# Patient Record
Sex: MALE | Race: ASIAN | NOT HISPANIC OR LATINO | Employment: OTHER | URBAN - METROPOLITAN AREA
[De-identification: names, ages, dates, MRNs, and addresses within clinical notes are randomized per-mention and may not be internally consistent; named-entity substitution may affect disease eponyms.]

---

## 2019-06-03 ENCOUNTER — OFFICE VISIT (OUTPATIENT)
Dept: URGENT CARE | Facility: CLINIC | Age: 44
End: 2019-06-03
Payer: COMMERCIAL

## 2019-06-03 VITALS
SYSTOLIC BLOOD PRESSURE: 124 MMHG | HEART RATE: 58 BPM | WEIGHT: 166 LBS | HEIGHT: 69 IN | OXYGEN SATURATION: 97 % | RESPIRATION RATE: 16 BRPM | DIASTOLIC BLOOD PRESSURE: 86 MMHG | TEMPERATURE: 98.9 F | BODY MASS INDEX: 24.59 KG/M2

## 2019-06-03 DIAGNOSIS — H10.212 CHEMICAL CONJUNCTIVITIS OF LEFT EYE: ICD-10-CM

## 2019-06-03 PROCEDURE — 99203 OFFICE O/P NEW LOW 30 MIN: CPT | Performed by: PHYSICIAN ASSISTANT

## 2019-06-03 ASSESSMENT — ENCOUNTER SYMPTOMS
BLURRED VISION: 0
VOMITING: 0
PHOTOPHOBIA: 0
DOUBLE VISION: 0
CARDIOVASCULAR NEGATIVE: 1
EYE ITCHING: 0
FEVER: 0
NAUSEA: 0
EYE DISCHARGE: 0
CHILLS: 0
EYE PAIN: 0
FOREIGN BODY SENSATION: 0
EYE REDNESS: 1
RESPIRATORY NEGATIVE: 1

## 2019-06-04 NOTE — PROGRESS NOTES
Subjective:      Fish Sandoval is a 43 y.o. male who presents with Eye Problem (put wrong drops in eye,red,swelling,left eye )            Eye Problem    The left eye is affected. This is a new problem. The current episode started today. The problem occurs constantly. The problem has been unchanged. Injury mechanism: Cleanig drops. There is no known exposure to pink eye. He wears contacts. Associated symptoms include eye redness. Pertinent negatives include no blurred vision, eye discharge, double vision, fever, foreign body sensation, itching, nausea, photophobia, recent URI or vomiting. He has tried eye drops for the symptoms. The treatment provided mild relief.   Patient accidentally put in his contact cleaning solution in his left eye.  Now it is red inflamed and swollen.  No pain or discharge.  No foreign body sensation or blurry vision.    PMH:  has no past medical history on file.  MEDS:   Current Outpatient Prescriptions:   •  azithromycin (ZITHROMAX) 250 MG TABS, Take  by mouth every day. 2 tabs by mouth day 1, 1 tab by mouth days 2-5, Disp: 6 Each, Rfl: 0  •  diazepam (VALIUM) 5 MG TABS, Take 2 Tabs by mouth. As needed for flying, Disp: 20 Each, Rfl: 1  •  azithromycin (ZITHROMAX) 250 MG TABS, Take  by mouth every day. 2 tabs by mouth day 1, 1 tab by mouth days 2-5, Disp: 6 Each, Rfl: 1  •  azelastine (ASTELIN) 137 MCG/SPRAY nasal spray, Spray 1 Spray in nose 2 times a day. Use in each nostril as directed, Disp: 1 Bottle, Rfl: 1  ALLERGIES:   Allergies   Allergen Reactions   • Pcn [Penicillins]      SURGHX:   Past Surgical History:   Procedure Laterality Date   • APPENDECTOMY       SOCHX:  reports that he has never smoked. He does not have any smokeless tobacco history on file. He reports that he does not drink alcohol.  FH: family history includes Cancer in his unknown relative.      Review of Systems   Constitutional: Negative for chills and fever.   HENT: Negative.    Eyes: Positive for redness. Negative  "for blurred vision, double vision, photophobia, pain, discharge and itching.   Respiratory: Negative.    Cardiovascular: Negative.    Gastrointestinal: Negative for nausea and vomiting.       Medications, Allergies, and current problem list reviewed today in Epic     Objective:     /86 (BP Location: Left arm, Patient Position: Sitting)   Pulse (!) 58   Temp 37.2 °C (98.9 °F) (Temporal)   Resp 16   Ht 1.753 m (5' 9\")   Wt 75.3 kg (166 lb)   SpO2 97%   BMI 24.51 kg/m²      Physical Exam   Constitutional: He is oriented to person, place, and time. He appears well-developed and well-nourished. No distress.   HENT:   Head: Normocephalic and atraumatic.   Eyes: Pupils are equal, round, and reactive to light. EOM and lids are normal. Lids are everted and swept, no foreign bodies found. Right eye exhibits no discharge and no exudate. No foreign body present in the right eye. Right conjunctiva is injected. Left conjunctiva is not injected.   Neck: Normal range of motion. Neck supple.   Cardiovascular: Normal rate, regular rhythm and normal heart sounds.    No murmur heard.  Pulmonary/Chest: Effort normal and breath sounds normal. No respiratory distress. He has no wheezes.   Neurological: He is alert and oriented to person, place, and time.   Skin: Skin is warm and dry. He is not diaphoretic.   Psychiatric: He has a normal mood and affect. His behavior is normal. Judgment and thought content normal.   Nursing note and vitals reviewed.              Assessment/Plan:     1. Chemical conjunctivitis of left eye       Erythematous irritated left conjunctiva.  Denies pain or vision changes.  Snellen vision check at baseline. no discharge or signs of infection.  Consistent with an acute chemical conjunctivitis.  No intervention indicated.  Artificial tears.  OTC meds and conservative measures as discussed    Return to clinic or go to ED if symptoms worsen or persist. Indications for ED discussed at length. Patient " voices understanding. Follow-up with your primary care provider in 3-5 days. Red flags discussed. All side effects of medication discussed including allergic response, GI upset, tendon injury, etc.    Please note that this dictation was created using voice recognition software. I have made every reasonable attempt to correct obvious errors, but I expect that there are errors of grammar and possibly content that I did not discover before finalizing the note.

## 2019-07-27 ENCOUNTER — OFFICE VISIT (OUTPATIENT)
Dept: URGENT CARE | Facility: CLINIC | Age: 44
End: 2019-07-27
Payer: COMMERCIAL

## 2019-07-27 VITALS
WEIGHT: 166 LBS | DIASTOLIC BLOOD PRESSURE: 80 MMHG | RESPIRATION RATE: 16 BRPM | BODY MASS INDEX: 23.77 KG/M2 | TEMPERATURE: 98.2 F | HEART RATE: 74 BPM | OXYGEN SATURATION: 95 % | SYSTOLIC BLOOD PRESSURE: 120 MMHG | HEIGHT: 70 IN

## 2019-07-27 DIAGNOSIS — J34.0 CELLULITIS OF MUCOUS MEMBRANE OF NOSE: ICD-10-CM

## 2019-07-27 DIAGNOSIS — I88.9 LYMPHADENITIS: ICD-10-CM

## 2019-07-27 PROCEDURE — 99214 OFFICE O/P EST MOD 30 MIN: CPT | Performed by: NURSE PRACTITIONER

## 2019-07-27 RX ORDER — SULFAMETHOXAZOLE AND TRIMETHOPRIM 800; 160 MG/1; MG/1
1 TABLET ORAL 2 TIMES DAILY
Qty: 14 TAB | Refills: 0 | Status: SHIPPED | OUTPATIENT
Start: 2019-07-27 | End: 2019-08-03

## 2019-07-27 ASSESSMENT — ENCOUNTER SYMPTOMS
CHILLS: 0
FEVER: 0
SWOLLEN GLANDS: 1

## 2019-07-27 NOTE — PROGRESS NOTES
"Subjective:      Fish Sandoval is a 43 y.o. male who presents with Swollen Glands (x 1 wk, swollen gland on Lt. side of throat, runny nose, no pain to swallow)    History reviewed. No pertinent past medical history.  Social History     Social History   • Marital status: Single     Spouse name: N/A   • Number of children: N/A   • Years of education: N/A     Occupational History   • Not on file.     Social History Main Topics   • Smoking status: Never Smoker   • Smokeless tobacco: Never Used   • Alcohol use No   • Drug use: Unknown   • Sexual activity: Not on file     Other Topics Concern   • Not on file     Social History Narrative   • No narrative on file     Family History   Problem Relation Age of Onset   • Cancer Unknown        Allergies: Pcn [penicillins]    Patient is a 43-year-old male who presents today with complaint of pain to the left nostril and noted submandibular left lymphadenopathy.  Symptoms started over the last week.         Other    This is a new problem. The current episode started in the past 7 days. The problem occurs constantly. The problem has been unchanged. Associated symptoms include congestion and swollen glands. Pertinent negatives include no chills or fever.  Nothing aggravates the symptoms. He has tried nothing for the symptoms. The treatment provided no relief.       Review of Systems   Constitutional: Positive for malaise/fatigue. Negative for chills and fever.   HENT: Positive for congestion.    Skin: Negative.    All other systems reviewed and are negative.         Objective:     /80 (BP Location: Right arm, Patient Position: Sitting, BP Cuff Size: Adult)   Pulse 74   Temp 36.8 °C (98.2 °F) (Temporal)   Resp 16   Ht 1.778 m (5' 10\")   Wt 75.3 kg (166 lb)   SpO2 95%   BMI 23.82 kg/m²       Physical Exam   Constitutional: He is oriented to person, place, and time. He appears well-developed and well-nourished. No distress.   HENT:   Head: Normocephalic.   Right Ear: " External ear normal.   Left Ear: External ear normal.   Mouth/Throat: Oropharynx is clear and moist. No oropharyngeal exudate.   There is significant redness and inflammation to the left nostril.  No drainage.  No tenderness over the frontal or maxillary sinuses.   Eyes: Pupils are equal, round, and reactive to light. Conjunctivae and EOM are normal.   Neck: Normal range of motion. Neck supple.   Palpable submandibular lymph node on the left side.   Cardiovascular: Normal rate, regular rhythm and normal heart sounds.    Pulmonary/Chest: Effort normal and breath sounds normal.   Musculoskeletal: Normal range of motion.   Lymphadenopathy:     He has cervical adenopathy.   Neurological: He is alert and oriented to person, place, and time.   Skin: Skin is warm and dry. He is not diaphoretic.   Psychiatric: He has a normal mood and affect. His behavior is normal. Judgment and thought content normal.   Vitals reviewed.              Assessment/Plan:     1. Lymphadenitis  2.  Cellulitis of the mucous membranes of the left nostril    Topical Bactroban  Bactrim DS; start for worsening of symptoms, or minimal response to topical Bactroban  Follow-up for persistent or worsening of symptoms

## 2020-01-11 ENCOUNTER — OFFICE VISIT (OUTPATIENT)
Dept: URGENT CARE | Facility: CLINIC | Age: 45
End: 2020-01-11
Payer: COMMERCIAL

## 2020-01-11 VITALS
DIASTOLIC BLOOD PRESSURE: 76 MMHG | TEMPERATURE: 97.2 F | BODY MASS INDEX: 23.77 KG/M2 | HEART RATE: 79 BPM | SYSTOLIC BLOOD PRESSURE: 116 MMHG | WEIGHT: 166 LBS | OXYGEN SATURATION: 98 % | RESPIRATION RATE: 15 BRPM | HEIGHT: 70 IN

## 2020-01-11 DIAGNOSIS — R68.89 SUSPECTED SOFT TISSUE INFECTION: ICD-10-CM

## 2020-01-11 DIAGNOSIS — R21 RASH: ICD-10-CM

## 2020-01-11 PROCEDURE — 99214 OFFICE O/P EST MOD 30 MIN: CPT | Performed by: NURSE PRACTITIONER

## 2020-01-11 RX ORDER — DIPHENOXYLATE HYDROCHLORIDE AND ATROPINE SULFATE 2.5; .025 MG/1; MG/1
1 TABLET ORAL
COMMUNITY

## 2020-01-11 RX ORDER — DIPHENHYDRAMINE HCL 25 MG
25 CAPSULE ORAL
COMMUNITY

## 2020-01-11 RX ORDER — ERGOCALCIFEROL (VITAMIN D2) 10 MCG
400 TABLET ORAL
COMMUNITY

## 2020-01-11 RX ORDER — TADALAFIL 5 MG/1
TABLET ORAL
COMMUNITY

## 2020-01-11 RX ORDER — LANOLIN ALCOHOL/MO/W.PET/CERES
1000 CREAM (GRAM) TOPICAL
COMMUNITY

## 2020-01-12 ASSESSMENT — ENCOUNTER SYMPTOMS
FEVER: 0
NAIL CHANGES: 0
RHINORRHEA: 0
VOMITING: 0
COUGH: 0
DIZZINESS: 0
NAUSEA: 0
CHILLS: 0
EYE PAIN: 0
MYALGIAS: 0
SHORTNESS OF BREATH: 0
FATIGUE: 0
SORE THROAT: 0

## 2020-01-12 NOTE — PROGRESS NOTES
"Subjective:   Fish Sandoval  is a 44 y.o. male who presents for Rash        Rash   This is a new problem. The current episode started 1 to 4 weeks ago (Patient associates rash with face mask from CPAP machine which he recently started wearing at night.  Rash does not worsen and/or improve). The problem is unchanged. The affected locations include the face. The rash is characterized by redness. He was exposed to nothing. Pertinent negatives include no cough, eye pain, fatigue, fever, nail changes, rhinorrhea, shortness of breath, sore throat or vomiting. Past treatments include nothing. The treatment provided no relief. His past medical history is significant for eczema. There is no history of allergies.     Review of Systems   Constitutional: Negative for chills, fatigue and fever.   HENT: Negative for rhinorrhea and sore throat.    Eyes: Negative for pain.   Respiratory: Negative for cough and shortness of breath.    Cardiovascular: Negative for chest pain.   Gastrointestinal: Negative for nausea and vomiting.   Genitourinary: Negative for hematuria.   Musculoskeletal: Negative for myalgias.   Skin: Positive for rash. Negative for itching and nail changes.   Neurological: Negative for dizziness.     Allergies   Allergen Reactions   • Pcn [Penicillins]       Objective:   /76   Pulse 79   Temp 36.2 °C (97.2 °F) (Temporal)   Resp 15   Ht 1.778 m (5' 10\")   Wt 75.3 kg (166 lb)   SpO2 98%   BMI 23.82 kg/m²   Physical Exam  Vitals signs and nursing note reviewed.   Constitutional:       General: He is not in acute distress.     Appearance: He is well-developed.   HENT:      Head: Normocephalic and atraumatic.      Right Ear: External ear normal.      Left Ear: External ear normal.      Nose: Nose normal.      Mouth/Throat:      Mouth: Mucous membranes are moist.   Eyes:      Conjunctiva/sclera: Conjunctivae normal.      Pupils: Pupils are equal, round, and reactive to light.   Cardiovascular:      Rate and " Rhythm: Normal rate and regular rhythm.      Heart sounds: No murmur.   Pulmonary:      Effort: Pulmonary effort is normal. No respiratory distress.      Breath sounds: Normal breath sounds.   Abdominal:      General: There is no distension.      Palpations: Abdomen is soft.      Tenderness: There is no tenderness.   Musculoskeletal: Normal range of motion.   Skin:     General: Skin is warm and dry.      Findings: Erythema and rash present. Rash is not crusting, macular, purpuric, pustular or vesicular.             Comments: 1mm erythematous rash of left upper lip.  No crusting or induration.  Without fluctuation.   Neurological:      General: No focal deficit present.      Mental Status: He is alert and oriented to person, place, and time. Mental status is at baseline.      Gait: Gait (gait at baseline) normal.   Psychiatric:         Judgment: Judgment normal.           Assessment/Plan:     1. Suspected soft tissue infection  mupirocin (BACTROBAN) 2 % Ointment   2. Rash     Patient is a 44-year-old male present with stated above.  Suspect soft tissue infection will trial topical antimicrobial ointment.  Discussed sterilizing CPAP machine each night prior to usage.  Patient given precautionary s/sx that mandate immediate follow up and evaluation in the ED. Advised of risks of not doing so.    DDX, Supportive care, and indications for immediate follow-up discussed with patient.    Instructed to return to clinic or nearest emergency department if we are not available for any change in condition, further concerns, or worsening of symptoms.    The patient demonstrated a good understanding and agreed with the treatment plan.

## 2020-02-06 ENCOUNTER — HOSPITAL ENCOUNTER (OUTPATIENT)
Dept: LAB | Facility: MEDICAL CENTER | Age: 45
End: 2020-02-06
Attending: FAMILY MEDICINE
Payer: COMMERCIAL

## 2020-02-06 LAB
ALBUMIN SERPL BCP-MCNC: 4.6 G/DL (ref 3.2–4.9)
ALBUMIN/GLOB SERPL: 1.6 G/DL
ALP SERPL-CCNC: 60 U/L (ref 30–99)
ALT SERPL-CCNC: 34 U/L (ref 2–50)
ANION GAP SERPL CALC-SCNC: 8 MMOL/L (ref 0–11.9)
AST SERPL-CCNC: 26 U/L (ref 12–45)
BASOPHILS # BLD AUTO: 1.2 % (ref 0–1.8)
BASOPHILS # BLD: 0.05 K/UL (ref 0–0.12)
BILIRUB SERPL-MCNC: 0.7 MG/DL (ref 0.1–1.5)
BUN SERPL-MCNC: 20 MG/DL (ref 8–22)
CALCIUM SERPL-MCNC: 9.3 MG/DL (ref 8.5–10.5)
CHLORIDE SERPL-SCNC: 106 MMOL/L (ref 96–112)
CO2 SERPL-SCNC: 25 MMOL/L (ref 20–33)
CREAT SERPL-MCNC: 1.08 MG/DL (ref 0.5–1.4)
EOSINOPHIL # BLD AUTO: 0.15 K/UL (ref 0–0.51)
EOSINOPHIL NFR BLD: 3.6 % (ref 0–6.9)
ERYTHROCYTE [DISTWIDTH] IN BLOOD BY AUTOMATED COUNT: 45.1 FL (ref 35.9–50)
ERYTHROCYTE [SEDIMENTATION RATE] IN BLOOD BY WESTERGREN METHOD: 12 MM/HOUR (ref 0–15)
FASTING STATUS PATIENT QL REPORTED: NORMAL
GLOBULIN SER CALC-MCNC: 2.8 G/DL (ref 1.9–3.5)
GLUCOSE SERPL-MCNC: 93 MG/DL (ref 65–99)
HCT VFR BLD AUTO: 45.5 % (ref 42–52)
HGB BLD-MCNC: 15.4 G/DL (ref 14–18)
IMM GRANULOCYTES # BLD AUTO: 0.01 K/UL (ref 0–0.11)
IMM GRANULOCYTES NFR BLD AUTO: 0.2 % (ref 0–0.9)
LYMPHOCYTES # BLD AUTO: 1.57 K/UL (ref 1–4.8)
LYMPHOCYTES NFR BLD: 37.6 % (ref 22–41)
MCH RBC QN AUTO: 32.3 PG (ref 27–33)
MCHC RBC AUTO-ENTMCNC: 33.8 G/DL (ref 33.7–35.3)
MCV RBC AUTO: 95.4 FL (ref 81.4–97.8)
MONOCYTES # BLD AUTO: 0.32 K/UL (ref 0–0.85)
MONOCYTES NFR BLD AUTO: 7.7 % (ref 0–13.4)
NEUTROPHILS # BLD AUTO: 2.08 K/UL (ref 1.82–7.42)
NEUTROPHILS NFR BLD: 49.7 % (ref 44–72)
NRBC # BLD AUTO: 0 K/UL
NRBC BLD-RTO: 0 /100 WBC
PLATELET # BLD AUTO: 231 K/UL (ref 164–446)
PMV BLD AUTO: 10.8 FL (ref 9–12.9)
POTASSIUM SERPL-SCNC: 4 MMOL/L (ref 3.6–5.5)
PROT SERPL-MCNC: 7.4 G/DL (ref 6–8.2)
RBC # BLD AUTO: 4.77 M/UL (ref 4.7–6.1)
SODIUM SERPL-SCNC: 139 MMOL/L (ref 135–145)
TSH SERPL DL<=0.005 MIU/L-ACNC: 2.57 UIU/ML (ref 0.38–5.33)
WBC # BLD AUTO: 4.2 K/UL (ref 4.8–10.8)

## 2020-02-06 PROCEDURE — 85025 COMPLETE CBC W/AUTO DIFF WBC: CPT

## 2020-02-06 PROCEDURE — 36415 COLL VENOUS BLD VENIPUNCTURE: CPT

## 2020-02-06 PROCEDURE — 85652 RBC SED RATE AUTOMATED: CPT

## 2020-02-06 PROCEDURE — 80053 COMPREHEN METABOLIC PANEL: CPT

## 2020-02-06 PROCEDURE — 84443 ASSAY THYROID STIM HORMONE: CPT

## 2020-03-13 ENCOUNTER — HOSPITAL ENCOUNTER (OUTPATIENT)
Dept: LAB | Facility: MEDICAL CENTER | Age: 45
End: 2020-03-13
Attending: FAMILY MEDICINE
Payer: COMMERCIAL

## 2020-03-13 LAB
BASOPHILS # BLD AUTO: 0.7 % (ref 0–1.8)
BASOPHILS # BLD: 0.03 K/UL (ref 0–0.12)
EOSINOPHIL # BLD AUTO: 0.09 K/UL (ref 0–0.51)
EOSINOPHIL NFR BLD: 2.2 % (ref 0–6.9)
ERYTHROCYTE [DISTWIDTH] IN BLOOD BY AUTOMATED COUNT: 47.8 FL (ref 35.9–50)
HCT VFR BLD AUTO: 45.3 % (ref 42–52)
HGB BLD-MCNC: 15.4 G/DL (ref 14–18)
IMM GRANULOCYTES # BLD AUTO: 0 K/UL (ref 0–0.11)
IMM GRANULOCYTES NFR BLD AUTO: 0 % (ref 0–0.9)
LYMPHOCYTES # BLD AUTO: 1.56 K/UL (ref 1–4.8)
LYMPHOCYTES NFR BLD: 38.5 % (ref 22–41)
MCH RBC QN AUTO: 32.8 PG (ref 27–33)
MCHC RBC AUTO-ENTMCNC: 34 G/DL (ref 33.7–35.3)
MCV RBC AUTO: 96.4 FL (ref 81.4–97.8)
MONOCYTES # BLD AUTO: 0.34 K/UL (ref 0–0.85)
MONOCYTES NFR BLD AUTO: 8.4 % (ref 0–13.4)
NEUTROPHILS # BLD AUTO: 2.03 K/UL (ref 1.82–7.42)
NEUTROPHILS NFR BLD: 50.2 % (ref 44–72)
NRBC # BLD AUTO: 0 K/UL
NRBC BLD-RTO: 0 /100 WBC
PLATELET # BLD AUTO: 175 K/UL (ref 164–446)
PMV BLD AUTO: 10.8 FL (ref 9–12.9)
RBC # BLD AUTO: 4.7 M/UL (ref 4.7–6.1)
WBC # BLD AUTO: 4.1 K/UL (ref 4.8–10.8)

## 2020-03-13 PROCEDURE — 85025 COMPLETE CBC W/AUTO DIFF WBC: CPT

## 2020-03-13 PROCEDURE — 36415 COLL VENOUS BLD VENIPUNCTURE: CPT

## 2020-07-17 ENCOUNTER — HOSPITAL ENCOUNTER (OUTPATIENT)
Dept: RADIOLOGY | Facility: MEDICAL CENTER | Age: 45
End: 2020-07-17
Attending: FAMILY MEDICINE
Payer: COMMERCIAL

## 2020-07-17 DIAGNOSIS — N50.9: ICD-10-CM

## 2020-07-17 PROCEDURE — 76870 US EXAM SCROTUM: CPT

## 2021-01-11 ENCOUNTER — HOSPITAL ENCOUNTER (OUTPATIENT)
Dept: RADIOLOGY | Facility: MEDICAL CENTER | Age: 46
End: 2021-01-11
Attending: UROLOGY
Payer: COMMERCIAL

## 2021-01-11 DIAGNOSIS — D29.30: ICD-10-CM

## 2021-01-11 PROCEDURE — 76870 US EXAM SCROTUM: CPT

## 2021-03-04 ENCOUNTER — HOSPITAL ENCOUNTER (OUTPATIENT)
Dept: LAB | Facility: MEDICAL CENTER | Age: 46
End: 2021-03-04
Attending: FAMILY MEDICINE
Payer: COMMERCIAL

## 2021-03-04 LAB
ALBUMIN SERPL BCP-MCNC: 4.4 G/DL (ref 3.2–4.9)
ALBUMIN/GLOB SERPL: 1.5 G/DL
ALP SERPL-CCNC: 73 U/L (ref 30–99)
ALT SERPL-CCNC: 34 U/L (ref 2–50)
ANION GAP SERPL CALC-SCNC: 10 MMOL/L (ref 7–16)
AST SERPL-CCNC: 27 U/L (ref 12–45)
BASOPHILS # BLD AUTO: 0.7 % (ref 0–1.8)
BASOPHILS # BLD: 0.03 K/UL (ref 0–0.12)
BILIRUB SERPL-MCNC: 0.6 MG/DL (ref 0.1–1.5)
BUN SERPL-MCNC: 21 MG/DL (ref 8–22)
CALCIUM SERPL-MCNC: 9.5 MG/DL (ref 8.5–10.5)
CHLORIDE SERPL-SCNC: 102 MMOL/L (ref 96–112)
CHOLEST SERPL-MCNC: 204 MG/DL (ref 100–199)
CO2 SERPL-SCNC: 27 MMOL/L (ref 20–33)
CREAT SERPL-MCNC: 0.91 MG/DL (ref 0.5–1.4)
EOSINOPHIL # BLD AUTO: 0.13 K/UL (ref 0–0.51)
EOSINOPHIL NFR BLD: 3 % (ref 0–6.9)
ERYTHROCYTE [DISTWIDTH] IN BLOOD BY AUTOMATED COUNT: 45.6 FL (ref 35.9–50)
FASTING STATUS PATIENT QL REPORTED: NORMAL
GLOBULIN SER CALC-MCNC: 3 G/DL (ref 1.9–3.5)
GLUCOSE SERPL-MCNC: 99 MG/DL (ref 65–99)
HCT VFR BLD AUTO: 47.1 % (ref 42–52)
HDLC SERPL-MCNC: 84 MG/DL
HGB BLD-MCNC: 15.8 G/DL (ref 14–18)
IMM GRANULOCYTES # BLD AUTO: 0.01 K/UL (ref 0–0.11)
IMM GRANULOCYTES NFR BLD AUTO: 0.2 % (ref 0–0.9)
LDLC SERPL CALC-MCNC: 109 MG/DL
LYMPHOCYTES # BLD AUTO: 1.56 K/UL (ref 1–4.8)
LYMPHOCYTES NFR BLD: 36.1 % (ref 22–41)
MCH RBC QN AUTO: 32.1 PG (ref 27–33)
MCHC RBC AUTO-ENTMCNC: 33.5 G/DL (ref 33.7–35.3)
MCV RBC AUTO: 95.7 FL (ref 81.4–97.8)
MONOCYTES # BLD AUTO: 0.41 K/UL (ref 0–0.85)
MONOCYTES NFR BLD AUTO: 9.5 % (ref 0–13.4)
NEUTROPHILS # BLD AUTO: 2.18 K/UL (ref 1.82–7.42)
NEUTROPHILS NFR BLD: 50.5 % (ref 44–72)
NRBC # BLD AUTO: 0 K/UL
NRBC BLD-RTO: 0 /100 WBC
PLATELET # BLD AUTO: 190 K/UL (ref 164–446)
PMV BLD AUTO: 11.2 FL (ref 9–12.9)
POTASSIUM SERPL-SCNC: 4.1 MMOL/L (ref 3.6–5.5)
PROT SERPL-MCNC: 7.4 G/DL (ref 6–8.2)
RBC # BLD AUTO: 4.92 M/UL (ref 4.7–6.1)
SODIUM SERPL-SCNC: 139 MMOL/L (ref 135–145)
TRIGL SERPL-MCNC: 54 MG/DL (ref 0–149)
WBC # BLD AUTO: 4.3 K/UL (ref 4.8–10.8)

## 2021-03-04 PROCEDURE — 80061 LIPID PANEL: CPT

## 2021-03-04 PROCEDURE — 83036 HEMOGLOBIN GLYCOSYLATED A1C: CPT

## 2021-03-04 PROCEDURE — 80053 COMPREHEN METABOLIC PANEL: CPT

## 2021-03-04 PROCEDURE — 85025 COMPLETE CBC W/AUTO DIFF WBC: CPT

## 2021-03-04 PROCEDURE — 36415 COLL VENOUS BLD VENIPUNCTURE: CPT

## 2021-03-05 LAB
EST. AVERAGE GLUCOSE BLD GHB EST-MCNC: 105 MG/DL
HBA1C MFR BLD: 5.3 % (ref 4–5.6)

## 2021-05-12 ENCOUNTER — HOSPITAL ENCOUNTER (OUTPATIENT)
Dept: RADIOLOGY | Facility: MEDICAL CENTER | Age: 46
End: 2021-05-12
Attending: FAMILY MEDICINE
Payer: COMMERCIAL

## 2021-05-12 DIAGNOSIS — R52 DEAFFERENTATION PAIN: ICD-10-CM

## 2021-05-12 PROCEDURE — 76536 US EXAM OF HEAD AND NECK: CPT

## 2022-01-10 ENCOUNTER — HOSPITAL ENCOUNTER (OUTPATIENT)
Dept: RADIOLOGY | Facility: MEDICAL CENTER | Age: 47
End: 2022-01-10
Attending: UROLOGY
Payer: COMMERCIAL

## 2022-01-10 DIAGNOSIS — D29.30: ICD-10-CM

## 2022-01-10 PROCEDURE — 76870 US EXAM SCROTUM: CPT

## 2022-03-11 ENCOUNTER — HOSPITAL ENCOUNTER (OUTPATIENT)
Dept: LAB | Facility: MEDICAL CENTER | Age: 47
End: 2022-03-11
Attending: FAMILY MEDICINE
Payer: COMMERCIAL

## 2022-03-11 LAB
EST. AVERAGE GLUCOSE BLD GHB EST-MCNC: 105 MG/DL
HBA1C MFR BLD: 5.3 % (ref 4–5.6)

## 2022-03-11 PROCEDURE — 36415 COLL VENOUS BLD VENIPUNCTURE: CPT

## 2022-03-11 PROCEDURE — 83036 HEMOGLOBIN GLYCOSYLATED A1C: CPT

## 2022-03-11 PROCEDURE — 84270 ASSAY OF SEX HORMONE GLOBUL: CPT

## 2022-03-11 PROCEDURE — 80053 COMPREHEN METABOLIC PANEL: CPT

## 2022-03-11 PROCEDURE — 80061 LIPID PANEL: CPT

## 2022-03-11 PROCEDURE — 84402 ASSAY OF FREE TESTOSTERONE: CPT

## 2022-03-11 PROCEDURE — 84403 ASSAY OF TOTAL TESTOSTERONE: CPT

## 2022-03-12 LAB
ALBUMIN SERPL BCP-MCNC: 4.7 G/DL (ref 3.2–4.9)
ALBUMIN/GLOB SERPL: 2.1 G/DL
ALP SERPL-CCNC: 59 U/L (ref 30–99)
ALT SERPL-CCNC: 26 U/L (ref 2–50)
ANION GAP SERPL CALC-SCNC: 10 MMOL/L (ref 7–16)
AST SERPL-CCNC: 27 U/L (ref 12–45)
BILIRUB SERPL-MCNC: 0.7 MG/DL (ref 0.1–1.5)
BUN SERPL-MCNC: 19 MG/DL (ref 8–22)
CALCIUM SERPL-MCNC: 9.3 MG/DL (ref 8.5–10.5)
CHLORIDE SERPL-SCNC: 102 MMOL/L (ref 96–112)
CHOLEST SERPL-MCNC: 209 MG/DL (ref 100–199)
CO2 SERPL-SCNC: 26 MMOL/L (ref 20–33)
CREAT SERPL-MCNC: 0.89 MG/DL (ref 0.5–1.4)
FASTING STATUS PATIENT QL REPORTED: NORMAL
GLOBULIN SER CALC-MCNC: 2.2 G/DL (ref 1.9–3.5)
GLUCOSE SERPL-MCNC: 96 MG/DL (ref 65–99)
HDLC SERPL-MCNC: 86 MG/DL
LDLC SERPL CALC-MCNC: 114 MG/DL
POTASSIUM SERPL-SCNC: 4.2 MMOL/L (ref 3.6–5.5)
PROT SERPL-MCNC: 6.9 G/DL (ref 6–8.2)
SODIUM SERPL-SCNC: 138 MMOL/L (ref 135–145)
TRIGL SERPL-MCNC: 46 MG/DL (ref 0–149)

## 2022-03-17 LAB
SHBG SERPL-SCNC: 38 NMOL/L (ref 17–56)
TESTOST FREE MFR SERPL: 1.7 % (ref 1.6–2.9)
TESTOST FREE SERPL-MCNC: 83 PG/ML (ref 47–244)
TESTOST SERPL-MCNC: 476 NG/DL (ref 300–890)

## 2022-08-15 ENCOUNTER — APPOINTMENT (OUTPATIENT)
Dept: INTERNAL MEDICINE | Facility: IMAGING CENTER | Age: 47
End: 2022-08-15
Payer: COMMERCIAL

## 2022-09-21 ENCOUNTER — OFFICE VISIT (OUTPATIENT)
Dept: URGENT CARE | Facility: CLINIC | Age: 47
End: 2022-09-21
Payer: COMMERCIAL

## 2022-09-21 VITALS
BODY MASS INDEX: 24.37 KG/M2 | HEART RATE: 78 BPM | HEIGHT: 70 IN | TEMPERATURE: 97.9 F | DIASTOLIC BLOOD PRESSURE: 74 MMHG | WEIGHT: 170.2 LBS | SYSTOLIC BLOOD PRESSURE: 108 MMHG | OXYGEN SATURATION: 97 % | RESPIRATION RATE: 16 BRPM

## 2022-09-21 DIAGNOSIS — H10.33 ACUTE CONJUNCTIVITIS OF BOTH EYES, UNSPECIFIED ACUTE CONJUNCTIVITIS TYPE: ICD-10-CM

## 2022-09-21 PROCEDURE — 99213 OFFICE O/P EST LOW 20 MIN: CPT | Performed by: STUDENT IN AN ORGANIZED HEALTH CARE EDUCATION/TRAINING PROGRAM

## 2022-09-21 RX ORDER — POLYMYXIN B SULFATE AND TRIMETHOPRIM 1; 10000 MG/ML; [USP'U]/ML
1 SOLUTION OPHTHALMIC EVERY 4 HOURS
Qty: 10 ML | Refills: 0 | Status: SHIPPED | OUTPATIENT
Start: 2022-09-21 | End: 2022-09-21

## 2022-09-21 RX ORDER — POLYMYXIN B SULFATE AND TRIMETHOPRIM 1; 10000 MG/ML; [USP'U]/ML
1 SOLUTION OPHTHALMIC EVERY 4 HOURS
Qty: 10 ML | Refills: 0 | Status: SHIPPED | OUTPATIENT
Start: 2022-09-21 | End: 2022-10-01

## 2022-09-21 ASSESSMENT — ENCOUNTER SYMPTOMS
COUGH: 0
NAUSEA: 0
PALPITATIONS: 0
PHOTOPHOBIA: 0
DIAPHORESIS: 0
DIZZINESS: 0
EYE REDNESS: 1
FEVER: 0
CONSTIPATION: 0
CHILLS: 0
FOREIGN BODY SENSATION: 0
SHORTNESS OF BREATH: 0
VOMITING: 0
DOUBLE VISION: 0
ABDOMINAL PAIN: 0
EYE DISCHARGE: 1
HEADACHES: 0
SORE THROAT: 0
BLURRED VISION: 1
WHEEZING: 0
DIARRHEA: 0

## 2022-09-21 ASSESSMENT — FIBROSIS 4 INDEX: FIB4 SCORE: 1.31

## 2022-09-22 ASSESSMENT — ENCOUNTER SYMPTOMS: EYE ITCHING: 1

## 2022-09-22 NOTE — PROGRESS NOTES
Subjective     Fish Sandoval is a 46 y.o. male who presents with Eye Pain (Irritated, redness x 10 days )            Fish is a 46 year old male who presents with bilateral eye irritation and redness.  Patient states symptoms started approximately 10 days ago.  Patient was seen by telemetry doc and was recommended antihistamine eyedrops which she has been using daily.  Patient states initially eye irritation and redness was worse in the right eye but has since worsened in the left eye.  Patient wears contact lenses but has not been wearing contacts since eye redness and irritation started.  Patient has no mechanism of injury.  Patient does not have foreign body sensation.  Patient denies headache, photophobia, nausea/vomiting.  Patient with no past medical history of hypertension or diabetes.  No recent illness.    Eye Injury   Both eyes are affected. This is a new problem. The current episode started 1 to 4 weeks ago. The problem has been waxing and waning. There was no injury mechanism. The pain is mild. He Wears contacts. Associated symptoms include blurred vision, an eye discharge, eye redness and itching. Pertinent negatives include no double vision, fever, foreign body sensation, nausea, photophobia, recent URI or vomiting. He has tried eye drops for the symptoms. The treatment provided mild relief.     Review of Systems   Constitutional:  Negative for chills, diaphoresis and fever.   HENT:  Negative for congestion, ear pain and sore throat.    Eyes:  Positive for blurred vision, discharge, redness and itching. Negative for double vision and photophobia.   Respiratory:  Negative for cough, shortness of breath and wheezing.    Cardiovascular:  Negative for chest pain and palpitations.   Gastrointestinal:  Negative for abdominal pain, constipation, diarrhea, nausea and vomiting.   Neurological:  Negative for dizziness and headaches.   All other systems reviewed and are negative.           Objective     /74  "  Pulse 78   Temp 36.6 °C (97.9 °F) (Temporal)   Resp 16   Ht 1.778 m (5' 10\")   Wt 77.2 kg (170 lb 3.2 oz)   SpO2 97%   BMI 24.42 kg/m²      Physical Exam  Vitals reviewed.   Constitutional:       Appearance: Normal appearance.   HENT:      Head: Normocephalic and atraumatic.      Nose: Nose normal.      Mouth/Throat:      Mouth: Mucous membranes are moist.      Pharynx: Oropharynx is clear.   Eyes:      General: Lids are normal. Gaze aligned appropriately.         Right eye: No discharge or hordeolum.         Left eye: Discharge present.No hordeolum.      Extraocular Movements: Extraocular movements intact.      Conjunctiva/sclera:      Right eye: Right conjunctiva is injected. No chemosis.     Left eye: Left conjunctiva is injected. Chemosis present.      Pupils: Pupils are equal, round, and reactive to light.   Musculoskeletal:         General: Normal range of motion.      Cervical back: Normal range of motion.   Neurological:      General: No focal deficit present.      Mental Status: He is alert and oriented to person, place, and time. Mental status is at baseline.      Cranial Nerves: Cranial nerves 2-12 are intact.      Sensory: Sensation is intact.      Motor: Motor function is intact.      Coordination: Coordination is intact.      Gait: Gait is intact.                           Assessment & Plan        1. Conjunctivitis of both eyes, unspecified conjunctivitis type  - polymixin-trimethoprim (POLYTRIM) 87796-6.1 UNIT/ML-% Solution; Administer 1 Drop into both eyes every 4 hours for 10 days.  Dispense: 10 mL; Refill: 0    Patient recommended to start use of daily oral antihistamine. Nonsedating oral antihistamines available without a prescription include Allegra, Claritin and Zyrtec.    Basic eye care reviewed with patient and includes: Cool compresses can help reduce eyelid and periorbital edema. Frequent use of refrigerated artificial tears throughout the day can also help to dilute and remove " allergen. Patient should stop use of contact lenses during symptomatic periods.    Differential diagnoses, supportive care, and indications for immediate follow-up discussed with patient. Pathogenesis of diagnosis discussed including typical length and natural progression.      Instructed to return to urgent care or nearest emergency department if symptoms fail to improve, for any change in condition, further concerns, or new concerning symptoms.    Patient states understanding and agrees with the plan of care and discharge instructions.

## 2023-03-31 ENCOUNTER — HOSPITAL ENCOUNTER (OUTPATIENT)
Dept: LAB | Facility: MEDICAL CENTER | Age: 48
End: 2023-03-31
Attending: FAMILY MEDICINE
Payer: COMMERCIAL

## 2023-03-31 LAB
ALBUMIN SERPL BCP-MCNC: 4.4 G/DL (ref 3.2–4.9)
ALBUMIN/GLOB SERPL: 1.6 G/DL
ALP SERPL-CCNC: 63 U/L (ref 30–99)
ALT SERPL-CCNC: 32 U/L (ref 2–50)
ANION GAP SERPL CALC-SCNC: 11 MMOL/L (ref 7–16)
AST SERPL-CCNC: 21 U/L (ref 12–45)
BILIRUB SERPL-MCNC: 0.7 MG/DL (ref 0.1–1.5)
BUN SERPL-MCNC: 23 MG/DL (ref 8–22)
CALCIUM ALBUM COR SERPL-MCNC: 8.8 MG/DL (ref 8.5–10.5)
CALCIUM SERPL-MCNC: 9.1 MG/DL (ref 8.5–10.5)
CHLORIDE SERPL-SCNC: 105 MMOL/L (ref 96–112)
CHOLEST SERPL-MCNC: 221 MG/DL (ref 100–199)
CO2 SERPL-SCNC: 23 MMOL/L (ref 20–33)
CREAT SERPL-MCNC: 0.94 MG/DL (ref 0.5–1.4)
EST. AVERAGE GLUCOSE BLD GHB EST-MCNC: 105 MG/DL
FASTING STATUS PATIENT QL REPORTED: NORMAL
GFR SERPLBLD CREATININE-BSD FMLA CKD-EPI: 100 ML/MIN/1.73 M 2
GLOBULIN SER CALC-MCNC: 2.8 G/DL (ref 1.9–3.5)
GLUCOSE SERPL-MCNC: 90 MG/DL (ref 65–99)
HBA1C MFR BLD: 5.3 % (ref 4–5.6)
HDLC SERPL-MCNC: 76 MG/DL
LDLC SERPL CALC-MCNC: 132 MG/DL
POTASSIUM SERPL-SCNC: 4 MMOL/L (ref 3.6–5.5)
PROT SERPL-MCNC: 7.2 G/DL (ref 6–8.2)
PSA SERPL-MCNC: 1 NG/ML (ref 0–4)
SODIUM SERPL-SCNC: 139 MMOL/L (ref 135–145)
TRIGL SERPL-MCNC: 64 MG/DL (ref 0–149)

## 2023-03-31 PROCEDURE — 80061 LIPID PANEL: CPT

## 2023-03-31 PROCEDURE — 83036 HEMOGLOBIN GLYCOSYLATED A1C: CPT

## 2023-03-31 PROCEDURE — 36415 COLL VENOUS BLD VENIPUNCTURE: CPT

## 2023-03-31 PROCEDURE — 80053 COMPREHEN METABOLIC PANEL: CPT

## 2023-03-31 PROCEDURE — 84153 ASSAY OF PSA TOTAL: CPT

## 2023-07-10 ENCOUNTER — HOSPITAL ENCOUNTER (OUTPATIENT)
Dept: RADIOLOGY | Facility: MEDICAL CENTER | Age: 48
End: 2023-07-10
Attending: UROLOGY
Payer: COMMERCIAL

## 2023-07-10 DIAGNOSIS — D29.30: ICD-10-CM

## 2023-07-10 PROCEDURE — 76870 US EXAM SCROTUM: CPT

## 2023-08-26 SDOH — ECONOMIC STABILITY: FOOD INSECURITY: WITHIN THE PAST 12 MONTHS, THE FOOD YOU BOUGHT JUST DIDN'T LAST AND YOU DIDN'T HAVE MONEY TO GET MORE.: NEVER TRUE

## 2023-08-26 SDOH — ECONOMIC STABILITY: FOOD INSECURITY: WITHIN THE PAST 12 MONTHS, YOU WORRIED THAT YOUR FOOD WOULD RUN OUT BEFORE YOU GOT MONEY TO BUY MORE.: NEVER TRUE

## 2023-08-26 SDOH — HEALTH STABILITY: PHYSICAL HEALTH: ON AVERAGE, HOW MANY MINUTES DO YOU ENGAGE IN EXERCISE AT THIS LEVEL?: 90 MIN

## 2023-08-26 SDOH — ECONOMIC STABILITY: INCOME INSECURITY: HOW HARD IS IT FOR YOU TO PAY FOR THE VERY BASICS LIKE FOOD, HOUSING, MEDICAL CARE, AND HEATING?: NOT VERY HARD

## 2023-08-26 SDOH — ECONOMIC STABILITY: TRANSPORTATION INSECURITY
IN THE PAST 12 MONTHS, HAS THE LACK OF TRANSPORTATION KEPT YOU FROM MEDICAL APPOINTMENTS OR FROM GETTING MEDICATIONS?: NO

## 2023-08-26 SDOH — ECONOMIC STABILITY: INCOME INSECURITY: IN THE LAST 12 MONTHS, WAS THERE A TIME WHEN YOU WERE NOT ABLE TO PAY THE MORTGAGE OR RENT ON TIME?: NO

## 2023-08-26 SDOH — ECONOMIC STABILITY: HOUSING INSECURITY
IN THE LAST 12 MONTHS, WAS THERE A TIME WHEN YOU DID NOT HAVE A STEADY PLACE TO SLEEP OR SLEPT IN A SHELTER (INCLUDING NOW)?: NO

## 2023-08-26 SDOH — HEALTH STABILITY: PHYSICAL HEALTH: ON AVERAGE, HOW MANY DAYS PER WEEK DO YOU ENGAGE IN MODERATE TO STRENUOUS EXERCISE (LIKE A BRISK WALK)?: 6 DAYS

## 2023-08-26 SDOH — ECONOMIC STABILITY: HOUSING INSECURITY: IN THE LAST 12 MONTHS, HOW MANY PLACES HAVE YOU LIVED?: 1

## 2023-08-26 SDOH — ECONOMIC STABILITY: TRANSPORTATION INSECURITY
IN THE PAST 12 MONTHS, HAS LACK OF RELIABLE TRANSPORTATION KEPT YOU FROM MEDICAL APPOINTMENTS, MEETINGS, WORK OR FROM GETTING THINGS NEEDED FOR DAILY LIVING?: NO

## 2023-08-26 SDOH — ECONOMIC STABILITY: TRANSPORTATION INSECURITY
IN THE PAST 12 MONTHS, HAS LACK OF TRANSPORTATION KEPT YOU FROM MEETINGS, WORK, OR FROM GETTING THINGS NEEDED FOR DAILY LIVING?: NO

## 2023-08-26 SDOH — HEALTH STABILITY: MENTAL HEALTH
STRESS IS WHEN SOMEONE FEELS TENSE, NERVOUS, ANXIOUS, OR CAN'T SLEEP AT NIGHT BECAUSE THEIR MIND IS TROUBLED. HOW STRESSED ARE YOU?: TO SOME EXTENT

## 2023-08-26 ASSESSMENT — SOCIAL DETERMINANTS OF HEALTH (SDOH)
HOW OFTEN DO YOU ATTEND CHURCH OR RELIGIOUS SERVICES?: NEVER
WITHIN THE PAST 12 MONTHS, YOU WORRIED THAT YOUR FOOD WOULD RUN OUT BEFORE YOU GOT THE MONEY TO BUY MORE: NEVER TRUE
DO YOU BELONG TO ANY CLUBS OR ORGANIZATIONS SUCH AS CHURCH GROUPS UNIONS, FRATERNAL OR ATHLETIC GROUPS, OR SCHOOL GROUPS?: NO
IN A TYPICAL WEEK, HOW MANY TIMES DO YOU TALK ON THE PHONE WITH FAMILY, FRIENDS, OR NEIGHBORS?: ONCE A WEEK
HOW OFTEN DO YOU GET TOGETHER WITH FRIENDS OR RELATIVES?: ONCE A WEEK
DO YOU BELONG TO ANY CLUBS OR ORGANIZATIONS SUCH AS CHURCH GROUPS UNIONS, FRATERNAL OR ATHLETIC GROUPS, OR SCHOOL GROUPS?: NO
ARE YOU MARRIED, WIDOWED, DIVORCED, SEPARATED, NEVER MARRIED, OR LIVING WITH A PARTNER?: NEVER MARRIED
HOW OFTEN DO YOU ATTENT MEETINGS OF THE CLUB OR ORGANIZATION YOU BELONG TO?: NEVER
IN A TYPICAL WEEK, HOW MANY TIMES DO YOU TALK ON THE PHONE WITH FAMILY, FRIENDS, OR NEIGHBORS?: ONCE A WEEK
HOW OFTEN DO YOU HAVE SIX OR MORE DRINKS ON ONE OCCASION: NEVER
HOW MANY DRINKS CONTAINING ALCOHOL DO YOU HAVE ON A TYPICAL DAY WHEN YOU ARE DRINKING: PATIENT DOES NOT DRINK
HOW OFTEN DO YOU GET TOGETHER WITH FRIENDS OR RELATIVES?: ONCE A WEEK
HOW HARD IS IT FOR YOU TO PAY FOR THE VERY BASICS LIKE FOOD, HOUSING, MEDICAL CARE, AND HEATING?: NOT VERY HARD
HOW OFTEN DO YOU ATTENT MEETINGS OF THE CLUB OR ORGANIZATION YOU BELONG TO?: NEVER
ARE YOU MARRIED, WIDOWED, DIVORCED, SEPARATED, NEVER MARRIED, OR LIVING WITH A PARTNER?: NEVER MARRIED
HOW OFTEN DO YOU ATTEND CHURCH OR RELIGIOUS SERVICES?: NEVER
HOW OFTEN DO YOU HAVE A DRINK CONTAINING ALCOHOL: NEVER

## 2023-08-26 ASSESSMENT — LIFESTYLE VARIABLES
HOW OFTEN DO YOU HAVE A DRINK CONTAINING ALCOHOL: NEVER
AUDIT-C TOTAL SCORE: 0
SKIP TO QUESTIONS 9-10: 1
HOW OFTEN DO YOU HAVE SIX OR MORE DRINKS ON ONE OCCASION: NEVER
HOW MANY STANDARD DRINKS CONTAINING ALCOHOL DO YOU HAVE ON A TYPICAL DAY: PATIENT DOES NOT DRINK

## 2023-08-28 ENCOUNTER — OFFICE VISIT (OUTPATIENT)
Dept: INTERNAL MEDICINE | Facility: IMAGING CENTER | Age: 48
End: 2023-08-28
Payer: COMMERCIAL

## 2023-08-28 VITALS
HEIGHT: 70 IN | RESPIRATION RATE: 16 BRPM | OXYGEN SATURATION: 96 % | HEART RATE: 92 BPM | DIASTOLIC BLOOD PRESSURE: 80 MMHG | SYSTOLIC BLOOD PRESSURE: 124 MMHG | TEMPERATURE: 97.7 F | BODY MASS INDEX: 24.34 KG/M2 | WEIGHT: 170 LBS

## 2023-08-28 DIAGNOSIS — N50.89 TESTICULAR MASS: ICD-10-CM

## 2023-08-28 DIAGNOSIS — M54.50 CHRONIC MIDLINE LOW BACK PAIN WITHOUT SCIATICA: ICD-10-CM

## 2023-08-28 DIAGNOSIS — E78.00 PURE HYPERCHOLESTEROLEMIA: ICD-10-CM

## 2023-08-28 DIAGNOSIS — R35.0 URINE FREQUENCY: ICD-10-CM

## 2023-08-28 DIAGNOSIS — E55.9 VITAMIN D DEFICIENCY: ICD-10-CM

## 2023-08-28 DIAGNOSIS — Z12.11 ENCOUNTER FOR SCREENING COLONOSCOPY: ICD-10-CM

## 2023-08-28 DIAGNOSIS — G47.33 OSA (OBSTRUCTIVE SLEEP APNEA): ICD-10-CM

## 2023-08-28 DIAGNOSIS — G89.29 CHRONIC MIDLINE LOW BACK PAIN WITHOUT SCIATICA: ICD-10-CM

## 2023-08-28 PROCEDURE — 3079F DIAST BP 80-89 MM HG: CPT | Performed by: INTERNAL MEDICINE

## 2023-08-28 PROCEDURE — 3074F SYST BP LT 130 MM HG: CPT | Performed by: INTERNAL MEDICINE

## 2023-08-28 PROCEDURE — 99205 OFFICE O/P NEW HI 60 MIN: CPT | Performed by: INTERNAL MEDICINE

## 2023-08-28 ASSESSMENT — PATIENT HEALTH QUESTIONNAIRE - PHQ9: CLINICAL INTERPRETATION OF PHQ2 SCORE: 0

## 2023-08-31 ENCOUNTER — HOSPITAL ENCOUNTER (OUTPATIENT)
Facility: MEDICAL CENTER | Age: 48
End: 2023-08-31
Attending: INTERNAL MEDICINE
Payer: COMMERCIAL

## 2023-08-31 ENCOUNTER — NON-PROVIDER VISIT (OUTPATIENT)
Dept: INTERNAL MEDICINE | Facility: IMAGING CENTER | Age: 48
End: 2023-08-31
Payer: COMMERCIAL

## 2023-08-31 DIAGNOSIS — Z00.00 WELL ADULT EXAM: ICD-10-CM

## 2023-08-31 DIAGNOSIS — N50.89 TESTICULAR MASS: ICD-10-CM

## 2023-08-31 DIAGNOSIS — E78.00 PURE HYPERCHOLESTEROLEMIA: ICD-10-CM

## 2023-08-31 DIAGNOSIS — Z11.59 ENCOUNTER FOR HEPATITIS C SCREENING TEST FOR LOW RISK PATIENT: ICD-10-CM

## 2023-08-31 DIAGNOSIS — G47.33 OSA (OBSTRUCTIVE SLEEP APNEA): ICD-10-CM

## 2023-08-31 LAB
ALBUMIN SERPL BCP-MCNC: 4.4 G/DL (ref 3.2–4.9)
ALBUMIN/GLOB SERPL: 1.6 G/DL
ALP SERPL-CCNC: 65 U/L (ref 30–99)
ALT SERPL-CCNC: 31 U/L (ref 2–50)
ANION GAP SERPL CALC-SCNC: 9 MMOL/L (ref 7–16)
APPEARANCE UR: CLEAR
AST SERPL-CCNC: 26 U/L (ref 12–45)
BASOPHILS # BLD AUTO: 0.7 % (ref 0–1.8)
BASOPHILS # BLD: 0.03 K/UL (ref 0–0.12)
BILIRUB SERPL-MCNC: 0.5 MG/DL (ref 0.1–1.5)
BILIRUB UR QL STRIP.AUTO: NEGATIVE
BUN SERPL-MCNC: 20 MG/DL (ref 8–22)
CALCIUM ALBUM COR SERPL-MCNC: 8.8 MG/DL (ref 8.5–10.5)
CALCIUM SERPL-MCNC: 9.1 MG/DL (ref 8.5–10.5)
CHLORIDE SERPL-SCNC: 103 MMOL/L (ref 96–112)
CHOLEST SERPL-MCNC: 192 MG/DL (ref 100–199)
CO2 SERPL-SCNC: 23 MMOL/L (ref 20–33)
COLOR UR: YELLOW
CREAT SERPL-MCNC: 0.99 MG/DL (ref 0.5–1.4)
EOSINOPHIL # BLD AUTO: 0.12 K/UL (ref 0–0.51)
EOSINOPHIL NFR BLD: 2.9 % (ref 0–6.9)
ERYTHROCYTE [DISTWIDTH] IN BLOOD BY AUTOMATED COUNT: 45.7 FL (ref 35.9–50)
GFR SERPLBLD CREATININE-BSD FMLA CKD-EPI: 93 ML/MIN/1.73 M 2
GLOBULIN SER CALC-MCNC: 2.7 G/DL (ref 1.9–3.5)
GLUCOSE SERPL-MCNC: 98 MG/DL (ref 65–99)
GLUCOSE UR STRIP.AUTO-MCNC: NEGATIVE MG/DL
HCT VFR BLD AUTO: 44 % (ref 42–52)
HCV AB SER QL: NORMAL
HDLC SERPL-MCNC: 73 MG/DL
HGB BLD-MCNC: 15.2 G/DL (ref 14–18)
IMM GRANULOCYTES # BLD AUTO: 0.02 K/UL (ref 0–0.11)
IMM GRANULOCYTES NFR BLD AUTO: 0.5 % (ref 0–0.9)
KETONES UR STRIP.AUTO-MCNC: NEGATIVE MG/DL
LDLC SERPL CALC-MCNC: 111 MG/DL
LEUKOCYTE ESTERASE UR QL STRIP.AUTO: NEGATIVE
LYMPHOCYTES # BLD AUTO: 1.44 K/UL (ref 1–4.8)
LYMPHOCYTES NFR BLD: 34.7 % (ref 22–41)
MCH RBC QN AUTO: 32.3 PG (ref 27–33)
MCHC RBC AUTO-ENTMCNC: 34.5 G/DL (ref 32.3–36.5)
MCV RBC AUTO: 93.6 FL (ref 81.4–97.8)
MICRO URNS: NORMAL
MONOCYTES # BLD AUTO: 0.35 K/UL (ref 0–0.85)
MONOCYTES NFR BLD AUTO: 8.4 % (ref 0–13.4)
NEUTROPHILS # BLD AUTO: 2.19 K/UL (ref 1.82–7.42)
NEUTROPHILS NFR BLD: 52.8 % (ref 44–72)
NITRITE UR QL STRIP.AUTO: NEGATIVE
NRBC # BLD AUTO: 0 K/UL
NRBC BLD-RTO: 0 /100 WBC (ref 0–0.2)
PH UR STRIP.AUTO: 6.5 [PH] (ref 5–8)
PLATELET # BLD AUTO: 192 K/UL (ref 164–446)
PMV BLD AUTO: 11.3 FL (ref 9–12.9)
POTASSIUM SERPL-SCNC: 4.2 MMOL/L (ref 3.6–5.5)
PROT SERPL-MCNC: 7.1 G/DL (ref 6–8.2)
PROT UR QL STRIP: NEGATIVE MG/DL
PSA SERPL-MCNC: 0.91 NG/ML (ref 0–4)
RBC # BLD AUTO: 4.7 M/UL (ref 4.7–6.1)
RBC UR QL AUTO: NEGATIVE
SODIUM SERPL-SCNC: 135 MMOL/L (ref 135–145)
SP GR UR STRIP.AUTO: 1.01
TRIGL SERPL-MCNC: 38 MG/DL (ref 0–149)
UROBILINOGEN UR STRIP.AUTO-MCNC: 0.2 MG/DL
WBC # BLD AUTO: 4.2 K/UL (ref 4.8–10.8)

## 2023-08-31 PROCEDURE — 85025 COMPLETE CBC W/AUTO DIFF WBC: CPT

## 2023-08-31 PROCEDURE — 86803 HEPATITIS C AB TEST: CPT

## 2023-08-31 PROCEDURE — 80053 COMPREHEN METABOLIC PANEL: CPT

## 2023-08-31 PROCEDURE — 84153 ASSAY OF PSA TOTAL: CPT

## 2023-08-31 PROCEDURE — 81003 URINALYSIS AUTO W/O SCOPE: CPT

## 2023-08-31 PROCEDURE — 80061 LIPID PANEL: CPT

## 2023-08-31 NOTE — PROGRESS NOTES
Fish Sandoval is a 47 y.o. male here for a non-provider visit for a lab draw on 8/31/2023 at 12:54 PM.    Procedure performed:  Venipuncture     Anatomical site:  Left Antecubital Area    Equipment used:  21 g vacutainer     Labs drawn:  annual labs    Ordering provider:  Hemanth Bland MD    Lab draw completed by:  Tamela Whatley R.N.

## 2023-09-05 ENCOUNTER — HOSPITAL ENCOUNTER (OUTPATIENT)
Dept: RADIOLOGY | Facility: MEDICAL CENTER | Age: 48
End: 2023-09-05
Attending: INTERNAL MEDICINE
Payer: COMMERCIAL

## 2023-09-05 DIAGNOSIS — R35.0 URINE FREQUENCY: ICD-10-CM

## 2023-09-05 PROCEDURE — 76775 US EXAM ABDO BACK WALL LIM: CPT

## 2023-09-15 ENCOUNTER — HOSPITAL ENCOUNTER (OUTPATIENT)
Dept: RADIOLOGY | Facility: MEDICAL CENTER | Age: 48
End: 2023-09-15
Attending: INTERNAL MEDICINE
Payer: COMMERCIAL

## 2023-09-15 DIAGNOSIS — G89.29 CHRONIC MIDLINE LOW BACK PAIN WITHOUT SCIATICA: ICD-10-CM

## 2023-09-15 DIAGNOSIS — M54.50 CHRONIC MIDLINE LOW BACK PAIN WITHOUT SCIATICA: ICD-10-CM

## 2023-09-15 PROCEDURE — 72100 X-RAY EXAM L-S SPINE 2/3 VWS: CPT

## 2023-09-18 DIAGNOSIS — G89.29 CHRONIC MIDLINE LOW BACK PAIN WITHOUT SCIATICA: ICD-10-CM

## 2023-09-18 DIAGNOSIS — M54.50 CHRONIC MIDLINE LOW BACK PAIN WITHOUT SCIATICA: ICD-10-CM

## 2023-09-28 DIAGNOSIS — E78.00 PURE HYPERCHOLESTEROLEMIA: ICD-10-CM

## 2023-09-28 DIAGNOSIS — G47.33 OSA (OBSTRUCTIVE SLEEP APNEA): ICD-10-CM

## 2023-09-29 ENCOUNTER — HOSPITAL ENCOUNTER (OUTPATIENT)
Dept: RADIOLOGY | Facility: MEDICAL CENTER | Age: 48
End: 2023-09-29
Attending: INTERNAL MEDICINE
Payer: COMMERCIAL

## 2023-09-29 DIAGNOSIS — G47.33 OSA (OBSTRUCTIVE SLEEP APNEA): ICD-10-CM

## 2023-09-29 DIAGNOSIS — E78.00 PURE HYPERCHOLESTEROLEMIA: ICD-10-CM

## 2023-09-29 PROCEDURE — 4410556 CT-CARDIAC SCORING (SELF PAY ONLY)

## 2023-10-04 ENCOUNTER — TELEPHONE (OUTPATIENT)
Dept: HEALTH INFORMATION MANAGEMENT | Facility: OTHER | Age: 48
End: 2023-10-04

## 2023-10-04 PROBLEM — D29.30: Status: ACTIVE | Noted: 2021-03-14

## 2023-10-04 PROBLEM — N32.81 OVERACTIVE BLADDER: Status: ACTIVE | Noted: 2021-03-14

## 2023-10-04 PROBLEM — I86.1 VARICOCELE: Status: ACTIVE | Noted: 2021-03-14

## 2023-10-04 PROBLEM — N48.6 INDURATION PENIS PLASTICA: Status: ACTIVE | Noted: 2022-07-28

## 2023-10-04 NOTE — PROGRESS NOTES
Chief Complaint   Patient presents with    [facility]     [facility]         HISTORY OF THE PRESENT ILLNESS: Patient is a 47 y.o. male.     Patient comes in to Re-establish care. I have seen this patient many years ago. He generally considers himself in good health.He exercises regularly. His diet is good.    His history of obstructive sleep apnea. He underwent evaluation at [facility]. He is been on APAP since 2019. No recent reevaluation.    Patient is a history of hyperlipidemia. No history of vascular disease. He is currently on no medication.    Patient is a history of overactive bladder and BPH. He has been seen by urology. He is also diagnosed with benign epididymial mass. He is had yearly ultrasound with urology. He complains of ongoing polyuria issues. He reports previous evaluation for urinary retention was unremarkable.    He also has a history of Peyronie's disease. He is been using topical medication with no significant benefit.    Patient also reports chronic back pain. No previous workup or imaging no radicular symptoms    Allergies: Pcn [penicillins]    Current Outpatient Medications Ordered in Epic   Medication Sig Dispense Refill    TURMERIC PO Take 1 Cap by mouth.      vitamin D, cholecalciferol, 400 units Tab tablet Take 400 Units by mouth.      Cyanocobalamin (VITAMIN B-12) 1000 MCG Tab Take 1,000 mcg by mouth.      diphenhydrAMINE (BENADRYL) 25 MG capsule Take 25 mg by mouth.      Multiple Vitamin (MULTI-VITAMINS) Tab Take 1 Tab by mouth.      tadalafil (CIALIS) 5 MG tablet Cialis 5 mg tablet   TAKE 1 TABLET BY MOUTH EVERY DAY AS DIRECTED BY MD      mupirocin (BACTROBAN) 2 % Ointment Apply 1 Application to affected area(s) 2 times a day. 1 Tube 0     No current Epic-ordered facility-administered medications on file.       Past medical history, social history and family history were reviewed from chart today    Review of systems: Per HPI.    All others negative.     Exam: /80 (BP  "Location: Left arm, Patient Position: Sitting, BP Cuff Size: Adult)   Pulse 92   Temp 36.5 °C (97.7 °F) (Temporal)   Resp 16   Ht 1.778 m (5' 10\")   Wt 77.1 kg (170 lb)   SpO2 96%   General: Physically fit.  No distress.  Normal appearing.  HEENT: Pupils are equal.  Conjunctiva is normal.  Head is normal appearing.  Ears, canals and tympanic membranes are normal.  Oral cavity is pink and moist without lesion.  Neck: Supple without JVD or bruit.  Thyroid is not enlarged.  Pulmonary: Clear with good breath sounds.  Cardiovascular regular rate and rhythm.  No murmur auscultated.  Carotid, radial and pedal pulses are intact.  Abdomen: Soft, nontender, nondistended.  Normal bowel sounds.  Organs are not enlarged.  Neurologic: Cranial nerves intact.  Strength and sensation are normal.  Normal patellar reflex.  Skin: No obvious lesions  Lymph: No cervical, supraclavicular, axillary, abdominal or inguinal adenopathy noted.  Genitourinary: Score was normal appearance. Testicles are normal in size and distended. He has a soft mass in the left epididymis measuring approximately 4 mm.        Diagnosis:  1. ZHOU (obstructive sleep apnea)  CBC WITH DIFFERENTIAL      2. Pure hypercholesterolemia  Comp Metabolic Panel    Lipid Profile      3. Testicular mass  CBC WITH DIFFERENTIAL    URINALYSIS,CULTURE IF INDICATED    [id number]      4. Chronic midline low back pain without sciatica  DX-LUMBAR SPINE-2 OR 3 VIEWS      5. Vitamin D deficiency  VITAMIN D,25 HYDROXY (DEFICIENCY)      6. Urine frequency  US-RENAL        39-year-old male who is in excellent physical health.  Recommended overnight eczema tree for reevaluation of sleep apnea. I would like this to be performed on APAP process efficacy.  Recommended cardiac CT for evaluation of lipids.  Recommended bladder ultrasound for post void residual.  Continue with yearly testicular imaging and follow up with urology.  Discussed galleri testing for cancer.  Recommended " imaging of the back and possible referral to physical therapy versus PMR.  I also recommended screening colonoscopy. We will refer to Dr. Engle    My total time spent caring for the patient on the day of the encounter was  greater than 60 minutes.   This includes obtaining history, reviewing chart, physical exam, patient education, reviewing outside records, placing orders, interpreting tests and coordinating care.

## 2023-10-26 ENCOUNTER — NON-PROVIDER VISIT (OUTPATIENT)
Dept: INTERNAL MEDICINE | Facility: IMAGING CENTER | Age: 48
End: 2023-10-26
Payer: COMMERCIAL

## 2023-10-26 DIAGNOSIS — J06.9 UPPER RESPIRATORY TRACT INFECTION, UNSPECIFIED TYPE: ICD-10-CM

## 2023-10-26 LAB
FLUAV RNA SPEC QL NAA+PROBE: NEGATIVE
FLUBV RNA SPEC QL NAA+PROBE: NEGATIVE
RSV RNA SPEC QL NAA+PROBE: NEGATIVE
SARS-COV-2 RNA RESP QL NAA+PROBE: POSITIVE

## 2023-10-26 PROCEDURE — 0241U POCT CEPHEID COV-2, FLU A/B, RSV - PCR: CPT | Performed by: INTERNAL MEDICINE

## 2023-11-02 ENCOUNTER — OFFICE VISIT (OUTPATIENT)
Dept: INTERNAL MEDICINE | Facility: IMAGING CENTER | Age: 48
End: 2023-11-02
Payer: COMMERCIAL

## 2023-11-02 VITALS
HEART RATE: 60 BPM | OXYGEN SATURATION: 94 % | SYSTOLIC BLOOD PRESSURE: 112 MMHG | RESPIRATION RATE: 12 BRPM | TEMPERATURE: 97.5 F | DIASTOLIC BLOOD PRESSURE: 60 MMHG

## 2023-11-02 DIAGNOSIS — B30.9 ACUTE VIRAL CONJUNCTIVITIS OF RIGHT EYE: ICD-10-CM

## 2023-11-02 DIAGNOSIS — U07.1 COVID-19 VIRUS INFECTION: ICD-10-CM

## 2023-11-02 DIAGNOSIS — R21 RASH: ICD-10-CM

## 2023-11-02 PROCEDURE — 3078F DIAST BP <80 MM HG: CPT | Performed by: INTERNAL MEDICINE

## 2023-11-02 PROCEDURE — 99213 OFFICE O/P EST LOW 20 MIN: CPT | Performed by: INTERNAL MEDICINE

## 2023-11-02 PROCEDURE — 3074F SYST BP LT 130 MM HG: CPT | Performed by: INTERNAL MEDICINE

## 2023-11-02 RX ORDER — OFLOXACIN 3 MG/ML
1 SOLUTION/ DROPS OPHTHALMIC 4 TIMES DAILY
Qty: 5 ML | Refills: 0 | Status: SHIPPED | OUTPATIENT
Start: 2023-11-02 | End: 2023-12-08 | Stop reason: SDUPTHER

## 2023-11-02 NOTE — PROGRESS NOTES
Chief Complaint   Patient presents with    Eye Problem       HISTORY OF THE PRESENT ILLNESS: Patient is a 48 y.o. male.     Patient comes in with 2 complaints.  He was recently diagnosed with COVID.  He is approximately 12 days days out from his initial diagnosis.  His initial complaint is redness and swelling in the area around the right eye.  There is some tenderness.  No discharge.  Symptoms been present for 2-3 days.  He uses refresh wetting drops.  He also complains of a skin lesion on his left forearm.  It is slightly raised, red and tender.  It is improving.  It has been present for 7-10 days.    Allergies: Pcn [penicillins]    Current Outpatient Medications Ordered in Epic   Medication Sig Dispense Refill    ofloxacin (OCUFLOX) 0.3 % Solution Administer 1 Drop into both eyes 4 times a day for 7 days. 5 mL 0    TURMERIC PO Take 1 Cap by mouth.      vitamin D, cholecalciferol, 400 units Tab tablet Take 400 Units by mouth.      Cyanocobalamin (VITAMIN B-12) 1000 MCG Tab Take 1,000 mcg by mouth.      diphenhydrAMINE (BENADRYL) 25 MG capsule Take 25 mg by mouth.      Multiple Vitamin (MULTI-VITAMINS) Tab Take 1 Tab by mouth.      tadalafil (CIALIS) 5 MG tablet Cialis 5 mg tablet   TAKE 1 TABLET BY MOUTH EVERY DAY AS DIRECTED BY MD      mupirocin (BACTROBAN) 2 % Ointment Apply 1 Application to affected area(s) 2 times a day. 1 Tube 0     No current Epic-ordered facility-administered medications on file.       Past medical history, social history and family history were reviewed from chart today    Review of systems: Per HPI.    All others negative.     Exam: /60 (BP Location: Left arm, Patient Position: Sitting, BP Cuff Size: Adult)   Pulse 60   Temp 36.4 °C (97.5 °F) (Temporal)   Resp 12   SpO2 94%   General: Well-appearing. Well-developed. No signs of distress.  HEENT: Grossly normal. Oral cavity is pink and moist.  Periorbital swelling on the right with induration, redness in the conjunctiva.  Neck:  Supple without JVD or bruit.  Pulmonary: Clear with good breath sounds. Normal effort.  Cardiovascular: Regular. Carotid and radial pulses are intact.  Abdomen: Soft, nontender, nondistended. Spleen and liver are not enlarged.  Neurologic: Cranial nerves II through XII are grossly normal, alert and oriented x3  Extremities: No rash of concern of the forearm appears bruise-like with some purplish/red discoloration and tenderness.  It is mildly raised.  It does not esha.    Diagnosis:  1. Acute viral conjunctivitis of right eye        2. Rash        3. COVID-19 virus infection          Suspect patient is experiencing complications of COVID.  Cannot exclude bacterial infection of the eye so will recommend starting Ocuflox.  Monitor rash as it appears to be resolving.  Follow-up if symptoms persist or worsen.    My total time spent caring for the patient on the day of the encounter was  greater than 20 minutes.   This includes obtaining history, reviewing chart, physical exam, patient education, reviewing outside records, placing orders, interpreting tests and coordinating care.

## 2023-11-06 ENCOUNTER — HOSPITAL ENCOUNTER (OUTPATIENT)
Dept: RADIOLOGY | Facility: MEDICAL CENTER | Age: 48
End: 2023-11-06
Attending: PHYSICAL MEDICINE & REHABILITATION
Payer: COMMERCIAL

## 2023-11-06 ENCOUNTER — OFFICE VISIT (OUTPATIENT)
Dept: PHYSICAL MEDICINE AND REHAB | Facility: MEDICAL CENTER | Age: 48
End: 2023-11-06
Payer: COMMERCIAL

## 2023-11-06 VITALS
SYSTOLIC BLOOD PRESSURE: 118 MMHG | OXYGEN SATURATION: 96 % | BODY MASS INDEX: 23.99 KG/M2 | TEMPERATURE: 97.8 F | WEIGHT: 167.55 LBS | HEART RATE: 63 BPM | DIASTOLIC BLOOD PRESSURE: 60 MMHG | HEIGHT: 70 IN

## 2023-11-06 DIAGNOSIS — G89.29 CHRONIC HIP PAIN, BILATERAL: ICD-10-CM

## 2023-11-06 DIAGNOSIS — M54.16 LUMBAR RADICULOPATHY: ICD-10-CM

## 2023-11-06 DIAGNOSIS — M25.552 CHRONIC HIP PAIN, BILATERAL: ICD-10-CM

## 2023-11-06 DIAGNOSIS — G89.29 CHRONIC RIGHT-SIDED LOW BACK PAIN WITH RIGHT-SIDED SCIATICA: ICD-10-CM

## 2023-11-06 DIAGNOSIS — M47.816 LUMBAR SPONDYLOSIS: ICD-10-CM

## 2023-11-06 DIAGNOSIS — M54.41 CHRONIC RIGHT-SIDED LOW BACK PAIN WITH RIGHT-SIDED SCIATICA: ICD-10-CM

## 2023-11-06 DIAGNOSIS — M25.551 CHRONIC HIP PAIN, BILATERAL: ICD-10-CM

## 2023-11-06 PROCEDURE — 3078F DIAST BP <80 MM HG: CPT | Performed by: PHYSICAL MEDICINE & REHABILITATION

## 2023-11-06 PROCEDURE — 3074F SYST BP LT 130 MM HG: CPT | Performed by: PHYSICAL MEDICINE & REHABILITATION

## 2023-11-06 PROCEDURE — 73522 X-RAY EXAM HIPS BI 3-4 VIEWS: CPT

## 2023-11-06 PROCEDURE — 99204 OFFICE O/P NEW MOD 45 MIN: CPT | Performed by: PHYSICAL MEDICINE & REHABILITATION

## 2023-11-06 PROCEDURE — 72100 X-RAY EXAM L-S SPINE 2/3 VWS: CPT

## 2023-11-06 ASSESSMENT — PAIN SCALES - GENERAL: PAINLEVEL: 3=SLIGHT PAIN

## 2023-11-06 ASSESSMENT — FIBROSIS 4 INDEX: FIB4 SCORE: 1.19

## 2023-11-06 ASSESSMENT — PATIENT HEALTH QUESTIONNAIRE - PHQ9: CLINICAL INTERPRETATION OF PHQ2 SCORE: 0

## 2023-11-06 NOTE — PROGRESS NOTES
New patient note    Interventional spine and Pain  Physiatry (physical medicine and  Rehabilitation)     Date of service: See epic    Chief complaint:   Chief Complaint   Patient presents with    New Patient     Chronic midline low back pain without sciatica        Referring provider: Hemanth Bland M.D.     HISTORY    HPI: Fish Hebert Bytony 48 y.o.  who presents today with Diagnoses of Chronic right-sided low back pain with right-sided sciatica, Chronic hip pain, bilateral, Lumbar radiculopathy, and Lumbar spondylosis were pertinent to this visit.    HPI    Chronic bilateral low back pain and right hip pain, this can radiate to the right hip. Present for the past 8 years. Improves with stretching. The pain is worse with sitting and lumbar flexion. Improves with lumbar extension. Lying down prone or supine is not painful.     He has done physical therapy in the past. He also does pilates. He has done this including the past 5 years.        Medical records review:  I reviewed the note from the referring provider Hemanth Bland M.D.            ROS:   Red Flags ROS:   Fever, Chills, Sweats: Denies  Involuntary Weight Loss: Denies  Bladder Incontinence: Denies  Bowel Incontinence: denies  Saddle Anesthesia: Denies    All other systems reviewed and negative.       PMHx:   History reviewed. No pertinent past medical history.      Current Outpatient Medications on File Prior to Visit   Medication Sig Dispense Refill    ofloxacin (OCUFLOX) 0.3 % Solution Administer 1 Drop into both eyes 4 times a day for 7 days. 5 mL 0    TURMERIC PO Take 1 Cap by mouth.      vitamin D, cholecalciferol, 400 units Tab tablet Take 400 Units by mouth.      Cyanocobalamin (VITAMIN B-12) 1000 MCG Tab Take 1,000 mcg by mouth.      diphenhydrAMINE (BENADRYL) 25 MG capsule Take 25 mg by mouth.      Multiple Vitamin (MULTI-VITAMINS) Tab Take 1 Tab by mouth.      tadalafil (CIALIS) 5 MG tablet Cialis 5 mg tablet   TAKE 1 TABLET BY MOUTH EVERY DAY  AS DIRECTED BY MD      mupirocin (BACTROBAN) 2 % Ointment Apply 1 Application to affected area(s) 2 times a day. 1 Tube 0     No current facility-administered medications on file prior to visit.        PSHx:   Past Surgical History:   Procedure Laterality Date    APPENDECTOMY         Family history   Family History   Problem Relation Age of Onset    Cancer Unknown          Medications: reviewed on epic.   Outpatient Medications Marked as Taking for the 11/6/23 encounter (Office Visit) with Tarik Whitlock M.D.   Medication Sig Dispense Refill    ofloxacin (OCUFLOX) 0.3 % Solution Administer 1 Drop into both eyes 4 times a day for 7 days. 5 mL 0    TURMERIC PO Take 1 Cap by mouth.      vitamin D, cholecalciferol, 400 units Tab tablet Take 400 Units by mouth.      Cyanocobalamin (VITAMIN B-12) 1000 MCG Tab Take 1,000 mcg by mouth.      diphenhydrAMINE (BENADRYL) 25 MG capsule Take 25 mg by mouth.      Multiple Vitamin (MULTI-VITAMINS) Tab Take 1 Tab by mouth.      tadalafil (CIALIS) 5 MG tablet Cialis 5 mg tablet   TAKE 1 TABLET BY MOUTH EVERY DAY AS DIRECTED BY MD      mupirocin (BACTROBAN) 2 % Ointment Apply 1 Application to affected area(s) 2 times a day. 1 Tube 0        Allergies:   Allergies   Allergen Reactions    Pcn [Penicillins]        Social Hx:   Social History     Socioeconomic History    Marital status: Single     Spouse name: Not on file    Number of children: Not on file    Years of education: Not on file    Highest education level: Professional school degree (e.g., MD, DDS, DVM, BURAK)   Occupational History    Not on file   Tobacco Use    Smoking status: Never    Smokeless tobacco: Never   Substance and Sexual Activity    Alcohol use: No    Drug use: Not on file    Sexual activity: Not on file   Other Topics Concern    Not on file   Social History Narrative    Not on file     Social Determinants of Health     Financial Resource Strain: Low Risk  (8/26/2023)    Overall Financial Resource Strain (CARDIA)  "    Difficulty of Paying Living Expenses: Not very hard   Food Insecurity: No Food Insecurity (8/26/2023)    Hunger Vital Sign     Worried About Running Out of Food in the Last Year: Never true     Ran Out of Food in the Last Year: Never true   Transportation Needs: No Transportation Needs (8/26/2023)    PRAPARE - Transportation     Lack of Transportation (Medical): No     Lack of Transportation (Non-Medical): No   Physical Activity: Sufficiently Active (8/26/2023)    Exercise Vital Sign     Days of Exercise per Week: 6 days     Minutes of Exercise per Session: 90 min   Stress: Stress Concern Present (8/26/2023)    [place] [employer] - Occupational Stress Questionnaire     Feeling of Stress : To some extent   Social Connections: Socially Isolated (8/26/2023)    Social Connection and Isolation Panel [NHANES]     Frequency of Communication with Friends and Family: Once a week     Frequency of Social Gatherings with Friends and Family: Once a week     Attends [employer] Services: Never     Active Member of Clubs or Organizations: No     Attends Club or Organization Meetings: Never     Marital Status: Never    Intimate Partner Violence: Not on file   Housing Stability: Low Risk  (8/26/2023)    Housing Stability Vital Sign     Unable to Pay for Housing in the Last Year: No     Number of Places Lived in the Last Year: 1     Unstable Housing in the Last Year: No         EXAMINATION     Physical Exam:   Vitals: /60 (BP Location: Right arm, Patient Position: Sitting, BP Cuff Size: Adult)   Pulse 63   Temp 36.6 °C (97.8 °F) (Temporal)   Ht 1.778 m (5' 10\")   Wt 76 kg (167 lb 8.8 oz)   SpO2 96%     Constitutional:   Body Habitus: Body mass index is 24.04 kg/m².  Cooperation: Fully cooperates with exam  Appearance: Well-groomed, well-nourished, not disheveled     Eyes: No scleral icterus to suggest severe liver disease, no proptosis to suggest severe hyperthyroid    ENT -no obvious " auditory deficits, no obvious tongue lesions, tongue midline, no facial droop     Skin -no rashes or lesions noted     Respiratory-  breathing comfortable on room air, no audible wheezing    Cardiovascular- capillary refills less than 2 seconds.     Psychiatric- alert and oriented ×3. Normal affect.     Gait - normal gait, no use of ambulatory device, nonantalgic. .     Musculoskeletal and Neuro -         Thoracic/Lumbar Spine/Sacral Spine/Hips   Inspection: No evidence of atrophy in bilateral lower extremities throughout     ROM: full  active range of motion with flexion, lateral flexion, and rotation bilaterally.   There is decreased active range of motion with lumbar extension with pain.    There is pain with facet loading maneuver (extension rotation) with axial low back pain on the BILATERAL side(s)    Palpation:   No tenderness to palpation in midline at T1-T12 levels. No tenderness to palpation in the left and right of the midline T1-L5, NEGATIVE for tenderness to palpation to the para-midline region in the lower lumbar levels.  palpation over SI joint: negative bilaterally    palpation in hip or over the gluteus medius tendon insertion: negative bilaterally      Lumbar spine Special tests  Neuro tension  Straight leg test negative bilaterally    Slump test negative bilaterally      HIP  FAIR test negative bilaterally    Range of motion in the RIGHT hip is full  in flexion, extension, abduction, internal rotation, external rotation.  Range of motion in the LEFT hip is full  in flexion, extension, abduction, internal rotation, external rotation.      SI joint tests  Observation patient sits on one buttocks: Negative  SI joint compression negative bilaterally    SI joint distraction negative bilaterally    Thigh thrust test negative bilaterally    BENJI test negative bilaterally                 Key points for the international standards for neurological classification of spinal cord injury (ISNCSCI) to light  "touch.     Dermatome R L                                      L2 2 2   L3 2 2   L4 2 2   L5 2 2   S1 2 2   S2 2 2       Motor Exam Lower Extremities    ? Myotome R L   Hip flexion L2 5 5   Knee extension L3 5 5   Ankle dorsiflexion L4 5 5   Toe extension L5 5 5   Ankle plantarflexion S1 5 5         Reflexes  ?  R L                Patella  2+ 2+   Achilles   2+ 2+       Babinski sign negative bilaterally   Clonus of the ankle negative bilaterally       MEDICAL DECISION MAKING    Medical records review: see under HPI section.     DATA    Labs:   Lab Results   Component Value Date/Time    SODIUM 135 08/31/2023 10:20 AM    POTASSIUM 4.2 08/31/2023 10:20 AM    CHLORIDE 103 08/31/2023 10:20 AM    CO2 23 08/31/2023 10:20 AM    ANION 9.0 08/31/2023 10:20 AM    GLUCOSE 98 08/31/2023 10:20 AM    BUN 20 08/31/2023 10:20 AM    CREATININE 0.99 08/31/2023 10:20 AM    CREATININE 0.91 08/12/2011 12:00 AM    CALCIUM 9.1 08/31/2023 10:20 AM    ASTSGOT 26 08/31/2023 10:20 AM    ALTSGPT 31 08/31/2023 10:20 AM    TBILIRUBIN 0.5 08/31/2023 10:20 AM    ALBUMIN 4.4 08/31/2023 10:20 AM    TOTPROTEIN 7.1 08/31/2023 10:20 AM    GLOBULIN 2.7 08/31/2023 10:20 AM    AGRATIO 1.6 08/31/2023 10:20 AM   ]    No results found for: \"PROTHROMBTM\", \"INR\"     Lab Results   Component Value Date/Time    WBC 4.2 (L) 08/31/2023 10:20 AM    WBC 5.6 08/12/2011 12:00 AM    RBC 4.70 08/31/2023 10:20 AM    RBC 4.86 08/12/2011 12:00 AM    HEMOGLOBIN 15.2 08/31/2023 10:20 AM    HEMATOCRIT 44.0 08/31/2023 10:20 AM    MCV 93.6 08/31/2023 10:20 AM    MCV 97 08/12/2011 12:00 AM    MCH 32.3 08/31/2023 10:20 AM    MCH 32.3 08/12/2011 12:00 AM    MCHC 34.5 08/31/2023 10:20 AM    MPV 11.3 08/31/2023 10:20 AM    NEUTSPOLYS 52.80 08/31/2023 10:20 AM    LYMPHOCYTES 34.70 08/31/2023 10:20 AM    MONOCYTES 8.40 08/31/2023 10:20 AM    EOSINOPHILS 2.90 08/31/2023 10:20 AM    BASOPHILS 0.70 08/31/2023 10:20 AM        Lab Results   Component Value Date/Time    HBA1C 5.3 03/31/2023 " 11:15 AM        Imaging:   I personally reviewed following images, these are my reads  X-ray lumbar spine 9/15/2023 facet arthropathy worst bilaterally at L4-5 and L5-S1.  No acute fractures.        IMAGING radiology reads. I reviewed the following radiology reads                                                                                        Results for orders placed during the hospital encounter of 09/15/23    DX-LUMBAR SPINE-2 OR 3 VIEWS    Impression  1.  Lower lumbar spine facet arthropathy    2.  Minimal levoconvex curvature    3.  Abdominal aortic atherosclerotic plaque                         Diagnosis   Visit Diagnoses     ICD-10-CM   1. Chronic right-sided low back pain with right-sided sciatica  M54.41    G89.29   2. Chronic hip pain, bilateral  M25.551    M25.552    G89.29   3. Lumbar radiculopathy  M54.16   4. Lumbar spondylosis  M47.816           ASSESSMENT AND PLAN:  Fish Hebert Byi 48 y.o. male      Fihs was seen today for new patient.    Diagnoses and all orders for this visit:    Chronic right-sided low back pain with right-sided sciatica  -     Referral to Chiropractic  -     Referral to Physical Therapy  -     DX-LUMBAR SPINE-2 OR 3 VIEWS; Future  -     MR-LUMBAR SPINE-W/O; Future    Chronic hip pain, bilateral  -     Referral to Chiropractic  -     Referral to Physical Therapy  -     DX-HIP-BILATERAL-WITH PELVIS-3/4 VIEWS; Future    Lumbar radiculopathy  -     Referral to Chiropractic  -     Referral to Physical Therapy  -     DX-LUMBAR SPINE-2 OR 3 VIEWS; Future  -     MR-LUMBAR SPINE-W/O; Future    Lumbar spondylosis  -     Referral to Chiropractic  -     Referral to Physical Therapy  -     DX-LUMBAR SPINE-2 OR 3 VIEWS; Future  -     MR-LUMBAR SPINE-W/O; Future      The patient likely has significant facet mediated pain.  He has had significant conservative treatments including physical therapy, home exercise program, medication management.  He has tried NSAIDs, ibuprofen.  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    Physical therapy: I ordered physical therapy to focus on strengthening and stretching.     home exercise program: I provided the patient with a strengthening and stretching with a home exercise program     Diagnostic workup: As above    Medications:   Medications: Acetaminophen up to 1000 mg 3 times daily as needed not to exceed 3000 mg per 24-hours.  I prescribed turmeric with black pepper.          Follow-up: After the above conservative treatments and diagnostic studies          Please note that this dictation was created using voice recognition software. I have made every reasonable attempt to correct obvious errors but there may be errors of grammar and content that I may have overlooked prior to finalization of this note.      Tarik Whitlock MD  Physical Medicine and Rehabilitation  Interventional Spine and Sports Physiatry  [facility]          Hemanth Bland M.D.

## 2023-11-13 ENCOUNTER — HOSPITAL ENCOUNTER (OUTPATIENT)
Dept: RADIOLOGY | Facility: MEDICAL CENTER | Age: 48
End: 2023-11-13
Attending: PHYSICAL MEDICINE & REHABILITATION
Payer: COMMERCIAL

## 2023-11-13 DIAGNOSIS — M54.41 CHRONIC RIGHT-SIDED LOW BACK PAIN WITH RIGHT-SIDED SCIATICA: ICD-10-CM

## 2023-11-13 DIAGNOSIS — G89.29 CHRONIC RIGHT-SIDED LOW BACK PAIN WITH RIGHT-SIDED SCIATICA: ICD-10-CM

## 2023-11-13 DIAGNOSIS — M47.816 LUMBAR SPONDYLOSIS: ICD-10-CM

## 2023-11-13 DIAGNOSIS — M54.16 LUMBAR RADICULOPATHY: ICD-10-CM

## 2023-11-13 PROCEDURE — 72148 MRI LUMBAR SPINE W/O DYE: CPT

## 2023-12-06 ENCOUNTER — OFFICE VISIT (OUTPATIENT)
Dept: PHYSICAL MEDICINE AND REHAB | Facility: MEDICAL CENTER | Age: 48
End: 2023-12-06
Payer: COMMERCIAL

## 2023-12-06 VITALS
BODY MASS INDEX: 24.34 KG/M2 | SYSTOLIC BLOOD PRESSURE: 122 MMHG | WEIGHT: 170 LBS | DIASTOLIC BLOOD PRESSURE: 80 MMHG | HEART RATE: 71 BPM | OXYGEN SATURATION: 95 % | TEMPERATURE: 97.6 F | HEIGHT: 70 IN

## 2023-12-06 DIAGNOSIS — G89.29 CHRONIC RIGHT-SIDED LOW BACK PAIN WITH RIGHT-SIDED SCIATICA: ICD-10-CM

## 2023-12-06 DIAGNOSIS — M25.552 CHRONIC HIP PAIN, BILATERAL: ICD-10-CM

## 2023-12-06 DIAGNOSIS — M54.41 CHRONIC RIGHT-SIDED LOW BACK PAIN WITH RIGHT-SIDED SCIATICA: ICD-10-CM

## 2023-12-06 DIAGNOSIS — M25.551 CHRONIC HIP PAIN, BILATERAL: ICD-10-CM

## 2023-12-06 DIAGNOSIS — G89.29 CHRONIC HIP PAIN, BILATERAL: ICD-10-CM

## 2023-12-06 DIAGNOSIS — M25.859 HIP IMPINGEMENT SYNDROME, UNSPECIFIED LATERALITY: ICD-10-CM

## 2023-12-06 DIAGNOSIS — M51.36 LUMBAR DEGENERATIVE DISC DISEASE: ICD-10-CM

## 2023-12-06 DIAGNOSIS — M54.16 LUMBAR RADICULOPATHY: ICD-10-CM

## 2023-12-06 DIAGNOSIS — M47.816 LUMBAR SPONDYLOSIS: ICD-10-CM

## 2023-12-06 DIAGNOSIS — M51.26 LUMBAR DISC HERNIATION: ICD-10-CM

## 2023-12-06 PROCEDURE — 1125F AMNT PAIN NOTED PAIN PRSNT: CPT | Performed by: PHYSICAL MEDICINE & REHABILITATION

## 2023-12-06 PROCEDURE — 3079F DIAST BP 80-89 MM HG: CPT | Performed by: PHYSICAL MEDICINE & REHABILITATION

## 2023-12-06 PROCEDURE — 3074F SYST BP LT 130 MM HG: CPT | Performed by: PHYSICAL MEDICINE & REHABILITATION

## 2023-12-06 PROCEDURE — 99214 OFFICE O/P EST MOD 30 MIN: CPT | Performed by: PHYSICAL MEDICINE & REHABILITATION

## 2023-12-06 ASSESSMENT — PAIN SCALES - GENERAL: PAINLEVEL: 3=SLIGHT PAIN

## 2023-12-06 ASSESSMENT — FIBROSIS 4 INDEX: FIB4 SCORE: 1.19

## 2023-12-06 ASSESSMENT — PATIENT HEALTH QUESTIONNAIRE - PHQ9: CLINICAL INTERPRETATION OF PHQ2 SCORE: 0

## 2023-12-06 NOTE — PROGRESS NOTES
Follow up patient note  Interventional spine and Pain  Physiatry (physical medicine and  Rehabilitation)       Chief complaint:   Chief Complaint   Patient presents with    Follow-Up     Chronic right-sided low back pain with right-sided sciatica            HISTORY    Please see new patient note by Dr Whitlock,  for more details.     HPI  Patient identification: Fish Christiani ,  1975,   With Diagnoses of Lumbar spondylosis, Lumbar radiculopathy, Chronic right-sided low back pain with right-sided sciatica, small Lumbar disc herniation L5-S1 with annular tear, Lumbar degenerative disc disease mild L5-S1, Chronic hip pain, bilateral, and Hip impingement syndrome were pertinent to this visit.     He notes that he has not felt this good in five years. He has been working with chiropractor Dr Walker Man.     Back pain is intermittent 3 out of 10 in intensity aching quality.  Neck pain and arm pain are 3 out of 10 in intensity.  He does have pain in the anterior right hip aching in quality moderate intensity intermittent.  He is able to go to the gym and is doing all of his ADLs and home exercises.       ROS Red Flags :   Fever, Chills, Sweats: Denies  Involuntary Weight Loss: Denies  Bowel/Bladder Incontinence: Denies  Saddle Anesthesia: Denies        PMHx:   History reviewed. No pertinent past medical history.    PSHx:   Past Surgical History:   Procedure Laterality Date    APPENDECTOMY         Family history   Family History   Problem Relation Age of Onset    Cancer Unknown          Medications:   Outpatient Medications Marked as Taking for the 23 encounter (Office Visit) with Tarik Whitlock M.D.   Medication Sig Dispense Refill    TURMERIC PO Take 1 Cap by mouth.      vitamin D, cholecalciferol, 400 units Tab tablet Take 400 Units by mouth.      Cyanocobalamin (VITAMIN B-12) 1000 MCG Tab Take 1,000 mcg by mouth.      diphenhydrAMINE (BENADRYL) 25 MG capsule Take 25 mg by mouth.      Multiple Vitamin  (MULTI-VITAMINS) Tab Take 1 Tab by mouth.      tadalafil (CIALIS) 5 MG tablet Cialis 5 mg tablet   TAKE 1 TABLET BY MOUTH EVERY DAY AS DIRECTED BY MD      mupirocin (BACTROBAN) 2 % Ointment Apply 1 Application to affected area(s) 2 times a day. 1 Tube 0        Current Outpatient Medications on File Prior to Visit   Medication Sig Dispense Refill    TURMERIC PO Take 1 Cap by mouth.      vitamin D, cholecalciferol, 400 units Tab tablet Take 400 Units by mouth.      Cyanocobalamin (VITAMIN B-12) 1000 MCG Tab Take 1,000 mcg by mouth.      diphenhydrAMINE (BENADRYL) 25 MG capsule Take 25 mg by mouth.      Multiple Vitamin (MULTI-VITAMINS) Tab Take 1 Tab by mouth.      tadalafil (CIALIS) 5 MG tablet Cialis 5 mg tablet   TAKE 1 TABLET BY MOUTH EVERY DAY AS DIRECTED BY MD      mupirocin (BACTROBAN) 2 % Ointment Apply 1 Application to affected area(s) 2 times a day. 1 Tube 0     No current facility-administered medications on file prior to visit.         Allergies:   Allergies   Allergen Reactions    Pcn [Penicillins]        Social Hx:   Social History     Socioeconomic History    Marital status: Single     Spouse name: Not on file    Number of children: Not on file    Years of education: Not on file    Highest education level: Professional school degree (e.g., MD, DDS, DVM, BURAK)   Occupational History    Not on file   Tobacco Use    Smoking status: Never    Smokeless tobacco: Never   Vaping Use    Vaping Use: Never used   Substance and Sexual Activity    Alcohol use: No    Drug use: Never    Sexual activity: Not on file   Other Topics Concern    Not on file   Social History Narrative    Not on file     Social Determinants of Health     Financial Resource Strain: Low Risk  (8/26/2023)    Overall Financial Resource Strain (CARDIA)     Difficulty of Paying Living Expenses: Not very hard   Food Insecurity: No Food Insecurity (8/26/2023)    Hunger Vital Sign     Worried About Running Out of Food in the Last Year: Never true      "Ran Out of Food in the Last Year: Never true   Transportation Needs: No Transportation Needs (8/26/2023)    PRAPARE - Transportation     Lack of Transportation (Medical): No     Lack of Transportation (Non-Medical): No   Physical Activity: Sufficiently Active (8/26/2023)    Exercise Vital Sign     Days of Exercise per Week: 6 days     Minutes of Exercise per Session: 90 min   Stress: Stress Concern Present (8/26/2023)    [place] [employer] - Occupational Stress Questionnaire     Feeling of Stress : To some extent   Social Connections: Socially Isolated (8/26/2023)    Social Connection and Isolation Panel [NHANES]     Frequency of Communication with Friends and Family: Once a week     Frequency of Social Gatherings with Friends and Family: Once a week     Attends Mormonism Services: Never     Active Member of Clubs or Organizations: No     Attends Club or Organization Meetings: Never     Marital Status: Never    Intimate Partner Violence: Not on file   Housing Stability: Low Risk  (8/26/2023)    Housing Stability Vital Sign     Unable to Pay for Housing in the Last Year: No     Number of Places Lived in the Last Year: 1     Unstable Housing in the Last Year: No         EXAMINATION     Physical Exam:   Vitals: /80 (BP Location: Right arm, Patient Position: Sitting, BP Cuff Size: Adult)   Pulse 71   Temp 36.4 °C (97.6 °F) (Temporal)   Ht 1.778 m (5' 10\")   Wt 77.1 kg (170 lb)   SpO2 95%     Constitutional:   Body Habitus: Body mass index is 24.39 kg/m².  Cooperation: Fully cooperates with exam  Appearance: Well-groomed no disheveled    Respiratory-  breathing comfortable on room air, no audible wheezing  Cardiovascular- capillary refills less than 2 seconds. No lower extremity edema is noted.   Psychiatric- alert and oriented ×3. Normal affect.    MSK and Neuro: -    Strength is 5 out of 5 in the bilateral lower extremities      MEDICAL DECISION MAKING    DATA    Labs:   Lab " "Results   Component Value Date/Time    SODIUM 135 08/31/2023 10:20 AM    POTASSIUM 4.2 08/31/2023 10:20 AM    CHLORIDE 103 08/31/2023 10:20 AM    CO2 23 08/31/2023 10:20 AM    GLUCOSE 98 08/31/2023 10:20 AM    BUN 20 08/31/2023 10:20 AM    CREATININE 0.99 08/31/2023 10:20 AM    CREATININE 0.91 08/12/2011 12:00 AM    BUNCREATRAT 19 08/12/2011 12:00 AM    GLOMRATE >59 07/23/2010 08:36 AM        No results found for: \"PROTHROMBTM\", \"INR\"     Lab Results   Component Value Date/Time    WBC 4.2 (L) 08/31/2023 10:20 AM    WBC 5.6 08/12/2011 12:00 AM    RBC 4.70 08/31/2023 10:20 AM    RBC 4.86 08/12/2011 12:00 AM    HEMOGLOBIN 15.2 08/31/2023 10:20 AM    HEMATOCRIT 44.0 08/31/2023 10:20 AM    MCV 93.6 08/31/2023 10:20 AM    MCV 97 08/12/2011 12:00 AM    MCH 32.3 08/31/2023 10:20 AM    MCH 32.3 08/12/2011 12:00 AM    MCHC 34.5 08/31/2023 10:20 AM    MPV 11.3 08/31/2023 10:20 AM    NEUTSPOLYS 52.80 08/31/2023 10:20 AM    LYMPHOCYTES 34.70 08/31/2023 10:20 AM    MONOCYTES 8.40 08/31/2023 10:20 AM    EOSINOPHILS 2.90 08/31/2023 10:20 AM    BASOPHILS 0.70 08/31/2023 10:20 AM        Lab Results   Component Value Date/Time    HBA1C 5.3 03/31/2023 11:15 AM          Imaging:   I personally reviewed following images    MRI lumbar spine 11/13/2023  Degenerative disc disease worst at L5-S1.  High intensity zone consistent with annular tear at L5-S1.  Facet arthropathy worst bilaterally at L5-S1 and also present at L4-5.  Mild degenerative disc disease at L4-5.    X-ray hips 11/6/2023  Morphology of the hips that can be seen with hip impingement syndrome.      I reviewed the following radiology reports                         Results for orders placed during the hospital encounter of 11/13/23    MR-LUMBAR SPINE-W/O    Impression  1.  Mild degenerative disease in the lumbar spine as described above. There is no significant spinal or neural foraminal stenosis.  2.  There is an approximately 1.2 cm sized T1 isointense and T2 hypointense " lesion in the L5 vertebral body likely representing an atypical hemangioma.        Results for orders placed during the hospital encounter of 23    MR-LUMBAR SPINE-W/O    Impression  1.  Mild degenerative disease in the lumbar spine as described above. There is no significant spinal or neural foraminal stenosis.  2.  There is an approximately 1.2 cm sized T1 isointense and T2 hypointense lesion in the L5 vertebral body likely representing an atypical hemangioma.                                                                                                                Results for orders placed during the hospital encounter of 23    DX-LUMBAR SPINE-2 OR 3 VIEWS    Impression  No significant spondylosis. No acute fracture or listhesis.                         DIAGNOSIS   Visit Diagnoses     ICD-10-CM   1. Lumbar spondylosis  M47.816   2. Lumbar radiculopathy  M54.16   3. Chronic right-sided low back pain with right-sided sciatica  M54.41    G89.29   4. small Lumbar disc herniation L5-S1 with annular tear  M51.26   5. Lumbar degenerative disc disease mild L5-S1  M51.36   6. Chronic hip pain, bilateral  M25.551    M25.552    G89.29   7. Hip impingement syndrome  M25.859         ASSESSMENT and PLAN:     Fish Hebert Bytony  1975 male      Fish was seen today for follow-up.    Diagnoses and all orders for this visit:    Lumbar spondylosis    Lumbar radiculopathy    Chronic right-sided low back pain with right-sided sciatica    small Lumbar disc herniation L5-S1 with annular tear    Lumbar degenerative disc disease mild L5-S1    Chronic hip pain, bilateral  -     Referral to Orthopedics    Hip impingement syndrome  -     Referral to Orthopedics        Continue with chiropractic care with Dr. Walker Man.  Continue with physical therapy and home exercise program.    Medications: Acetaminophen up to 1000 mg 3 times daily as needed not to exceed 3000 mg per 24-hours.    Follow up: 6 months or sooner as  needed    Thank you for allowing me to participate in the care of this patient. If you have any questions please not hesitate to contact me.             Please note that this dictation was created using voice recognition software. I have made every reasonable attempt to correct obvious errors but there may be errors of grammar and content that I may have overlooked prior to finalization of this note.      Tarik Whitlock MD  Interventional Spine and Sports Physiatry  Physical Medicine and Rehabilitation  [facility]       HOLLEY Bland M.D.   CC Walker Man DC

## 2023-12-08 ENCOUNTER — OFFICE VISIT (OUTPATIENT)
Dept: INTERNAL MEDICINE | Facility: IMAGING CENTER | Age: 48
End: 2023-12-08
Payer: COMMERCIAL

## 2023-12-08 VITALS
DIASTOLIC BLOOD PRESSURE: 70 MMHG | TEMPERATURE: 97.6 F | OXYGEN SATURATION: 98 % | HEART RATE: 52 BPM | SYSTOLIC BLOOD PRESSURE: 110 MMHG | RESPIRATION RATE: 12 BRPM

## 2023-12-08 DIAGNOSIS — H10.33 ACUTE CONJUNCTIVITIS OF BOTH EYES, UNSPECIFIED ACUTE CONJUNCTIVITIS TYPE: ICD-10-CM

## 2023-12-08 DIAGNOSIS — G89.29 CHRONIC MIDLINE LOW BACK PAIN WITHOUT SCIATICA: ICD-10-CM

## 2023-12-08 DIAGNOSIS — M54.50 CHRONIC MIDLINE LOW BACK PAIN WITHOUT SCIATICA: ICD-10-CM

## 2023-12-08 PROCEDURE — 3074F SYST BP LT 130 MM HG: CPT | Performed by: INTERNAL MEDICINE

## 2023-12-08 PROCEDURE — 3078F DIAST BP <80 MM HG: CPT | Performed by: INTERNAL MEDICINE

## 2023-12-08 PROCEDURE — 99213 OFFICE O/P EST LOW 20 MIN: CPT | Performed by: INTERNAL MEDICINE

## 2023-12-08 RX ORDER — OFLOXACIN 3 MG/ML
1 SOLUTION/ DROPS OPHTHALMIC 4 TIMES DAILY
Qty: 5 ML | Refills: 0 | Status: SHIPPED | OUTPATIENT
Start: 2023-12-08 | End: 2023-12-15

## 2023-12-08 NOTE — PROGRESS NOTES
Chief Complaint   Patient presents with    Eye Problem     Possible return of conjunctivitis        HISTORY OF THE PRESENT ILLNESS: Patient is a 48 y.o. male.     Patient comes in for evaluation of possible recurrence of his conjunctivitis.  He was treated 1 month ago for right eye conjunctivitis.  This was after COVID.  He has noticed an increase in swelling and redness in the left eye although he thinks it is bilateral.  There is been some increase in pruritus but no discharge.  No visual change.    We also discussed his Back issues.  He was seen by Dr. Whitlock and by chiropractor, Dr. Man.  Patient reports that he is doing much better.    Allergies: Pcn [penicillins]    Current Outpatient Medications Ordered in Epic   Medication Sig Dispense Refill    ofloxacin (OCUFLOX) 0.3 % Solution Administer 1 Drop into both eyes 4 times a day for 7 days. 5 mL 0    TURMERIC PO Take 1 Cap by mouth.      vitamin D, cholecalciferol, 400 units Tab tablet Take 400 Units by mouth.      Cyanocobalamin (VITAMIN B-12) 1000 MCG Tab Take 1,000 mcg by mouth.      diphenhydrAMINE (BENADRYL) 25 MG capsule Take 25 mg by mouth.      Multiple Vitamin (MULTI-VITAMINS) Tab Take 1 Tab by mouth.      tadalafil (CIALIS) 5 MG tablet Cialis 5 mg tablet   TAKE 1 TABLET BY MOUTH EVERY DAY AS DIRECTED BY MD      mupirocin (BACTROBAN) 2 % Ointment Apply 1 Application to affected area(s) 2 times a day. 1 Tube 0     No current Epic-ordered facility-administered medications on file.       Past medical history, social history and family history were reviewed from chart today    Review of systems: Per HPI.    All others negative.     Exam: /70 (BP Location: Left arm, Patient Position: Sitting, BP Cuff Size: Adult)   Pulse (!) 52   Temp 36.4 °C (97.6 °F) (Temporal)   Resp 12   SpO2 98%   General: Well-appearing. Well-developed. No signs of distress.  HEENT: Grossly normal. Oral cavity is pink and moist.  Bilateral conjunctiva are injected.  No  discharge.  PERRLA, EOMI.  Neck: Supple without JVD or bruit.  Pulmonary: Clear with good breath sounds. Normal effort.  Cardiovascular: Regular. Carotid and radial pulses are intact.  Abdomen: Soft, nontender, nondistended. Spleen and liver are not enlarged.  Neurologic: Cranial nerves II through XII are grossly normal, alert and oriented x3      Diagnosis:  1. Acute conjunctivitis of both eyes, unspecified acute conjunctivitis type        2. Chronic midline low back pain without sciatica          Resume drops for eyes.  Recommended he throw out his rewetting drops as they may be contaminated.  Follow-up with pain management/PMR/chiropractor    My total time spent caring for the patient on the day of the encounter was  greater than 20 minutes.   This includes obtaining history, reviewing chart, physical exam, patient education, reviewing outside records, placing orders, interpreting tests and coordinating care.

## 2024-01-08 ENCOUNTER — PHYSICAL THERAPY (OUTPATIENT)
Dept: PHYSICAL THERAPY | Facility: MEDICAL CENTER | Age: 49
End: 2024-01-08
Attending: PHYSICAL MEDICINE & REHABILITATION
Payer: COMMERCIAL

## 2024-01-08 DIAGNOSIS — M54.41 CHRONIC RIGHT-SIDED LOW BACK PAIN WITH RIGHT-SIDED SCIATICA: ICD-10-CM

## 2024-01-08 DIAGNOSIS — G89.29 CHRONIC HIP PAIN, BILATERAL: ICD-10-CM

## 2024-01-08 DIAGNOSIS — M25.552 CHRONIC HIP PAIN, BILATERAL: ICD-10-CM

## 2024-01-08 DIAGNOSIS — G89.29 CHRONIC RIGHT-SIDED LOW BACK PAIN WITH RIGHT-SIDED SCIATICA: ICD-10-CM

## 2024-01-08 DIAGNOSIS — M47.816 LUMBAR SPONDYLOSIS: ICD-10-CM

## 2024-01-08 DIAGNOSIS — M54.16 LUMBAR RADICULOPATHY: ICD-10-CM

## 2024-01-08 DIAGNOSIS — M25.551 CHRONIC HIP PAIN, BILATERAL: ICD-10-CM

## 2024-01-08 PROCEDURE — 97162 PT EVAL MOD COMPLEX 30 MIN: CPT

## 2024-01-08 PROCEDURE — 97110 THERAPEUTIC EXERCISES: CPT

## 2024-01-08 ASSESSMENT — ENCOUNTER SYMPTOMS: PAIN SCALE AT HIGHEST: 5

## 2024-01-08 NOTE — OP THERAPY EVALUATION
"  Outpatient Physical Therapy  INITIAL EVALUATION    [facility] Outpatient Physical Therapy  [address]  [place] [address]  Phone:  [phone]  Fax:  [phone]    Date of Evaluation: 01/08/2024    Patient: Fish Sandoval  YOB: 1975  MRN: [id number]     Referring Provider: Tarik Whitlock M.D.  [address]  [address]  [place] [address]   Referring Diagnosis Chronic hip pain, bilateral [M25.551, M25.552, G89.29];Chronic right-sided low back pain with right-sided sciatica [M54.41, G89.29];Lumbar radiculopathy [M54.16];Lumbar spondylosis [M47.816]     Time Calculation  Start time: 1632  Stop time: 1724 Time Calculation (min): 52 minutes         Chief Complaint: Hip Problem and Back Problem    Visit Diagnoses     ICD-10-CM   1. Chronic hip pain, bilateral  M25.551    M25.552    G89.29   2. Chronic right-sided low back pain with right-sided sciatica  M54.41    G89.29   3. Lumbar radiculopathy  M54.16   4. Lumbar spondylosis  M47.816       Date of onset of impairment: No data found    Subjective:   History of Present Illness:     Mechanism of injury:  Patient is a 48 year old male with a PMH including: Overactive bladder, appendectomy.    Pt presents to therapy with complaints of back and hip pain complaints, which initiated 10 years ago. Stating he was 22 pounds heavier back then. Noticed \"sciatica,\" which transitioned to posterior knee. No improvements with PT in the past. Pain resolved with weight loss, time vs. pilates. 2 years ago started having lateral hip pain (inferior to greater trochanter). Noticed this when he slept. This transitioned into L back pain. Stating he has noticed some pain with crunches at the gym.  Back pain is localized to L and R hip pain. Pain in hip is nearly constant and back pain is intermittent. Denies n/t into LE's. No valsalva.     Currently denies changes in bowel and bladder, saddle anesthesia, significant weight " changes, chills/night sweats, nausea and vomiting, and unexplained fatigue. Denies history of cancer. Pt consents to evaluation and treatment today.         Sleep disturbance:  Interrupted sleep  Pain:     Pain scale: 3/10 back; 4/10 hip.    Pain scale at lowest: 0/10 back, 2.5/10 hip.    At worst pain ratin    Location:  L back and R hip    Progression:  Improving    Pain Comments::  Aggravating: prolonged sitting, road trips in care, prolonged standing,  crunches off BOSU,  yoga occasionally, pain with turning in bed    Relieving: lying down on sectional sofa, chiropractics, adjustable bed  Diagnostic Tests:     Diagnostic Tests Comments:  MRI lumbar spine 2023  Degenerative disc disease worst at L5-S1.  High intensity zone consistent with annular tear at L5-S1.  Facet arthropathy worst bilaterally at L5-S1 and also present at L4-5.  Mild degenerative disc disease at L4-5.     X-ray hips 2023  Morphology of the hips that can be seen with hip impingement syndrome.    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  Past Surgical History:   Procedure Laterality Date    APPENDECTOMY       Social History     Tobacco Use    Smoking status: Never    Smokeless tobacco: Never   Substance Use Topics    Alcohol use: No     Family and Occupational History     Socioeconomic History    Marital status: Single     Spouse name: Not on file    Number of children: Not on file    Years of education: Not on file    Highest education level: Professional school degree (e.g., MD, TILAS,  TI, [employer])   Occupational History    Not on file       Objective     Postural Observations  Seated posture: fair    Additional Postural Observation Details  Forward head and rounded shoulders in standing     Low back slightly increased in standing; hip no change     Hip Screen   Hip range of motion within functional limits with the following exceptions: Slightly limited hip IR ROM L>R  HS length WFL  *assessed in 90/90 position   Hip strength within functional limits with the following exceptions: Supine LE extension test:  4-/5 L  3+/5 R     Glute med MMT:  4- L  3+ R  *compensates using hip flexors    Palpation   Left   No palpable tenderness to the lumbar paraspinals.     Right   No palpable tenderness to the lumbar paraspinals.     Active Range of Motion     Lumbar   Flexion: within functional limits (FT to toes; back improved; back improved)  Extension: decreased (hip improved; back worse; mod restricted)  Left lateral flexion: decreased  Right lateral flexion: decreased (SB to R worse than L; hip worse)  Left rotation: decreased  Right rotation: decreased (worse discomfort to R>L)    Tests       Lumbar spine (left)      Negative slump.   Lumbar spine (right)     Negative slump.     General Comments     Spine Comments   Prone press ups: improved pain-free l/s motion        Therapeutic Exercises (CPT 64025):     1. pt education, re: PT POC and findings    2. new hep as below      Therapeutic Exercise Summary: Access Code: [email or web address]  URL: [email or web address]  Date: 01/08/2024  Prepared by: Gavin Pimentel    Exercises  - Bridge with Arms at Sides and Feet on Swiss Ball  - 1-2 x daily - 7 x weekly - 1-3 sets - 1-2 min  hold  - Prone Press Up  - 1-2 x daily - 7 x weekly - 1 sets - 10 reps  - Standing Thoracolumbar extension at wall  - 1-2 x daily - 7 x weekly - 1 sets - 10 reps      Pt performed these exercises with instruction and SPV.  Provided handout with these exercises for daily  HEP.          Time-based treatments/modalities:    Physical Therapy Timed Treatment Charges  Therapeutic exercise minutes (CPT 83519): 22 minutes      Assessment, Response and Plan:   Impairments: abnormal or restricted ROM, impaired physical strength, lacks appropriate home exercise program and pain with function    Assessment details:  Patient is a pleasant and cooperative 48 year old male who presents with acute on chronic L sided LBP and lateral R hip pain. He is currently active in the gym but candid about not completing LE routine. Hx of PT in the past unsuccessful in treating LBP. Imaging remarkable for hip impingement and degenerative changes at L4-S1. Exam findings suggestive of provisional dx of posterior derangement due to pos response to extension routine, prox pelvic girdle and core weakness. Pt may benefit from skilled physical therapy in order to address above impairments in order to improve QOL and return to reported ADL's.     Prognosis: good    Goals:   Short Term Goals:   1. Pt will be independent with written HEP.      Short term goal time span:  2-4 weeks      Long Term Goals:    1. Pt will be independent with written HEP.  2. Pt will have a sig improvement in RMQ >/= MDC (29.17)  3. Pt will be able to complete prolonged sitting with min to no modifications x 2 hours to improve work efficiency and ADL's.  4. Pt will have 70% or greater improvement in bed mobility and sleep hygiene.    Long term goal time span:  6-8 weeks    Plan:   Therapy options:  Physical therapy treatment to continue  Planned therapy interventions:  Neuromuscular Re-education (CPT 93653), Manual Therapy (CPT 96968), Gait Training (CPT 07925), E Stim Unattended (CPT 88363) and Therapeutic Exercise (CPT 78189)  Frequency: 1-2x/wk.  Duration in visits:  12  Discussed with:  Patient  Plan details:  UPOC: 3/8/24          Functional Assessment Used  Grey Ilir Low Back Pain and Disability Score: 29.17  WOMAC Grand Total: 27.08      Referring provider co-signature:  I have reviewed this plan of care and my co-signature certifies the need for services.    Certification Period: 01/08/2024 to  3/8/24    Physician Signature: ________________________________ Date: ______________

## 2024-01-15 ENCOUNTER — PHYSICAL THERAPY (OUTPATIENT)
Dept: PHYSICAL THERAPY | Facility: MEDICAL CENTER | Age: 49
End: 2024-01-15
Attending: PHYSICAL MEDICINE & REHABILITATION
Payer: COMMERCIAL

## 2024-01-15 DIAGNOSIS — G89.29 CHRONIC HIP PAIN, BILATERAL: ICD-10-CM

## 2024-01-15 DIAGNOSIS — M25.551 CHRONIC HIP PAIN, BILATERAL: ICD-10-CM

## 2024-01-15 DIAGNOSIS — M25.552 CHRONIC HIP PAIN, BILATERAL: ICD-10-CM

## 2024-01-15 PROCEDURE — 97110 THERAPEUTIC EXERCISES: CPT

## 2024-01-15 PROCEDURE — 97140 MANUAL THERAPY 1/> REGIONS: CPT

## 2024-01-15 NOTE — OP THERAPY DAILY TREATMENT
"  Outpatient Physical Therapy  DAILY TREATMENT     [facility] Outpatient Physical Therapy  [address]  [place] [address]  Phone:  [phone]  Fax:  [phone]    Date: 01/15/2024    Patient: Fish Sandoval  YOB: 1975  MRN: [id number]     Time Calculation    Start time: 1632  Stop time: 1722 Time Calculation (min): 50 minutes         Chief Complaint: Hip Problem    Visit #: 2    SUBJECTIVE:  Pain frequency is less, prev more sharp and now noticing some soreness and stiffness. Pain seems to have moved from L side of back to more central back and buttock. Notices less pain after prolonged sitting. Hip is also improved.     OBJECTIVE:  Current objective measures:   Today:  Hypomobile t/s globally; restricted rotation to L>R  TTP R psoas      From eval:  Hip Screen   Hip range of motion within functional limits with the following exceptions: Slightly limited hip IR ROM L>R  HS length WFL  *assessed in 90/90 position   Hip strength within functional limits with the following exceptions: Supine LE extension test:  4-/5 L  3+/5 R      Glute med MMT:  4- L  3+ R  *compensates using hip flexors    Spine Comments   Prone press ups: improved pain-free l/s motion    Therapeutic Exercises (CPT 93642):     1. pt education, re: PT POC and findings    2. new hep as below    3. PPU, NT; pt reporting incr in triceps discomfort    4. ball bridge, NT; pt holding for about a min    5. l/s extensions at wall, NT    6. clamshells, pink TB, x10 with 3 sec hold, ;    7. hip flexor stretch, 1/2 kneeling and on step; x30 sec ea, responding well to cuing for bring \"two lines together.\"    8. pelvic tilts in 1/2 kneeling, x5    9. SL t/s rotations, x10 ea, restricted to the L      Therapeutic Exercise Summary: Access Code: [email or web address]  URL: [email or web address]  Date: 01/08/2024  Prepared by: Gavin Pimentel    Exercises  - Bridge with Arms at Sides and Feet on Swiss Ball  - " "1-2 x daily - 7 x weekly - 1-3 sets - 1-2 min  hold  - Prone Press Up  - 1-2 x daily - 7 x weekly - 1 sets - 10 reps  - Standing Thoracolumbar extension at wall  - 1-2 x daily - 7 x weekly - 1 sets - 10 reps      Pt performed these exercises with instruction and SPV.  Provided handout with these exercises for daily HEP.        Therapeutic Treatments and Modalities:     1. Manual Therapy (CPT 37369), x5 min, STM to R psoas, CPA to CTJ-L5 gr gr III and rotational mobs to CTJ-T10 gr III in prone lying    Time-based treatments/modalities:    Physical Therapy Timed Treatment Charges  Manual therapy minutes (CPT 78774): 15 minutes  Therapeutic exercise minutes (CPT 92471): 35 minutes  Pain rating (1-10) before treatment:  mild soreness at R side and central back; R hip pain  Pain rating (1-10) after treatment:  \"it's better\"      ASSESSMENT:   Response to treatment:   Hypomobile t/s and TTP at R psoas musculature; suspect restrictions in muscle length and motion as contributing factors to pt's pain complaints. Will continue to address these areas and follow up with prox pelvic girdle and core strengthening.       PLAN/RECOMMENDATIONS:   Plan for treatment: therapy treatment to continue next visit.  Planned interventions for next visit: continue with current treatment.         "

## 2024-01-17 ENCOUNTER — PHYSICAL THERAPY (OUTPATIENT)
Dept: PHYSICAL THERAPY | Facility: MEDICAL CENTER | Age: 49
End: 2024-01-17
Attending: PHYSICAL MEDICINE & REHABILITATION
Payer: COMMERCIAL

## 2024-01-17 DIAGNOSIS — G89.29 CHRONIC RIGHT-SIDED LOW BACK PAIN WITH RIGHT-SIDED SCIATICA: ICD-10-CM

## 2024-01-17 DIAGNOSIS — M25.552 CHRONIC HIP PAIN, BILATERAL: ICD-10-CM

## 2024-01-17 DIAGNOSIS — M25.551 CHRONIC HIP PAIN, BILATERAL: ICD-10-CM

## 2024-01-17 DIAGNOSIS — G89.29 CHRONIC HIP PAIN, BILATERAL: ICD-10-CM

## 2024-01-17 DIAGNOSIS — M54.41 CHRONIC RIGHT-SIDED LOW BACK PAIN WITH RIGHT-SIDED SCIATICA: ICD-10-CM

## 2024-01-17 PROCEDURE — 97140 MANUAL THERAPY 1/> REGIONS: CPT

## 2024-01-17 PROCEDURE — 97110 THERAPEUTIC EXERCISES: CPT

## 2024-01-17 NOTE — OP THERAPY DAILY TREATMENT
Outpatient Physical Therapy  DAILY TREATMENT     [facility] Outpatient Physical Therapy  [address]  [place] [address]  Phone:  [phone]  Fax:  [phone]    Date: 01/17/2024    Patient: Fish Sandoval  YOB: 1975  MRN: [id number]     Time Calculation    Start time: 1501  Stop time: 1554 Time Calculation (min): 53 minutes         Chief Complaint: Hip Problem and Back Problem    Visit #: 3    SUBJECTIVE:  Pt reporting his tricep is still bothering him on the R. Mentioning that back and hip are feeling better with decr frequency and intensity of pain. Unsure if he's completing his hip flexor stretch appropriately.      OBJECTIVE:  Current objective measures:   R Shoulder screen:  Shoulder flexion WFL  Tricep resisted strong and pain-free  Tricep lengthening WFL  TTP Tricep musculature   Median nerve and radial nerve tension tests mildly positive     Today:  Hypomobile t/s globally; restricted rotation to L>R  TTP R psoas      From eval:  Hip Screen   Hip range of motion within functional limits with the following exceptions: Slightly limited hip IR ROM L>R  HS length WFL  *assessed in 90/90 position   Hip strength within functional limits with the following exceptions: Supine LE extension test:  4-/5 L  3+/5 R      Glute med MMT:  4- L  3+ R  *compensates using hip flexors    Spine Comments   Prone press ups: improved pain-free l/s motion    Therapeutic Exercises (CPT 03458):     3. PPU, NT; pt reporting incr in triceps discomfort    4. ball bridge, NT; pt holding for about a min    5. l/s extensions at wall, NT    6. clamshells, pink TB, x10 with 3 sec hold, NT    7. hip flexor stretch, 1/2 kneeling and on step; x30 sec ea, reviewed at cage    8. pelvic tilts in 1/2 kneeling, x5, NT    9. SL t/s rotations, x10 ea, NT    10. 1/2 FR sequence: open book pec stretch, alt GH flexion, SA punches alternating, x15 ea    11. Full FR t/s extension, 5-10x at mid  "and upper t/s    12. lat pull downs, pink TB; x10 with 2 sec hold, no triceps pain; hep    13. tricep extensions with staff, x6, incr in pain -discontinued    14. shoulder extensions at cage, x10, pain-free shoulder extension    15. pull overs, x10 10>15# dumbbell, hep    16. radial  and medial n. glides, x10 ea, hep      Therapeutic Exercise Summary: Access Code: [email or web address]  URL: [email or web address]  Date: 01/08/2024  Prepared by: Gavin Pimentel    Exercises  - Bridge with Arms at Sides and Feet on Swiss Ball  - 1-2 x daily - 7 x weekly - 1-3 sets - 1-2 min  hold  - Prone Press Up  - 1-2 x daily - 7 x weekly - 1 sets - 10 reps  - Standing Thoracolumbar extension at wall  - 1-2 x daily - 7 x weekly - 1 sets - 10 reps      Pt performed these exercises with instruction and SPV.  Provided handout with these exercises for daily HEP.        Therapeutic Treatments and Modalities:     1. Manual Therapy (CPT 15353), x10 min    Therapeutic Treatment and Modalities Summary: Manual: STM to triceps R followed by cupping x 4 min    Application of 1 cup applied to tricep while resting  //erythema following treatment at site; no other adverse effects following treatment.        Time-based treatments/modalities:    Physical Therapy Timed Treatment Charges  Manual therapy minutes (CPT 21385): 10 minutes  Therapeutic exercise minutes (CPT 61164): 43 minutes    Pain rating (1-10) before treatment:  mild soreness at R side and central back; R hip pain  Pain rating (1-10) after treatment:  \"it's better\"      ASSESSMENT:   Response to treatment:   Mildly pos median and radial neural irritation. Unable to reproduce s/s with resisted tricep extensions or stretching objective measures. Is TTP at triceps region. Addressing today with exercises for isometric focus at triceps without pain. Does present with restricted extension, likely due to posturing. No adverse response or sig impact from cupping, may increase suction in next " session. Will assess response in upcoming session and continue to strengthen posterior chain globally.    PLAN/RECOMMENDATIONS:   Plan for treatment: therapy treatment to continue next visit.  Planned interventions for next visit: continue with current treatment.  Assess response to cupping and new hep

## 2024-01-22 ENCOUNTER — PHYSICAL THERAPY (OUTPATIENT)
Dept: PHYSICAL THERAPY | Facility: MEDICAL CENTER | Age: 49
End: 2024-01-22
Attending: PHYSICAL MEDICINE & REHABILITATION
Payer: COMMERCIAL

## 2024-01-22 DIAGNOSIS — M25.552 CHRONIC HIP PAIN, BILATERAL: ICD-10-CM

## 2024-01-22 DIAGNOSIS — G89.29 CHRONIC HIP PAIN, BILATERAL: ICD-10-CM

## 2024-01-22 DIAGNOSIS — G89.29 CHRONIC RIGHT-SIDED LOW BACK PAIN WITH RIGHT-SIDED SCIATICA: ICD-10-CM

## 2024-01-22 DIAGNOSIS — M54.41 CHRONIC RIGHT-SIDED LOW BACK PAIN WITH RIGHT-SIDED SCIATICA: ICD-10-CM

## 2024-01-22 DIAGNOSIS — M25.551 CHRONIC HIP PAIN, BILATERAL: ICD-10-CM

## 2024-01-22 PROCEDURE — 97140 MANUAL THERAPY 1/> REGIONS: CPT

## 2024-01-22 PROCEDURE — 97110 THERAPEUTIC EXERCISES: CPT

## 2024-01-22 NOTE — OP THERAPY DAILY TREATMENT
Outpatient Physical Therapy  DAILY TREATMENT     [facility] Outpatient Physical Therapy  [address]  [place] [address]  Phone:  [phone]  Fax:  [phone]    Date: 01/22/2024    Patient: Fish Sandoval  YOB: 1975  MRN: [id number]     Time Calculation    Start time: 1631  Stop time: 1711 Time Calculation (min): 40 minutes         Chief Complaint: Back Problem and Hip Problem    Visit #: 4    SUBJECTIVE:  Pt reporting very good response to cupping and the nerve glides. Is hoping to repeat cupping in session today.    OBJECTIVE:  Current objective measures:   R Shoulder screen:  Shoulder flexion WFL  Tricep resisted strong and pain-free  Tricep lengthening WFL  TTP Tricep musculature   Median nerve and radial nerve tension tests mildly positive     Today:  Hypomobile t/s globally; restricted rotation to L>R  TTP R psoas      From eval:  Hip Screen   Hip range of motion within functional limits with the following exceptions: Slightly limited hip IR ROM L>R  HS length WFL  *assessed in 90/90 position   Hip strength within functional limits with the following exceptions: Supine LE extension test:  4-/5 L  3+/5 R      Glute med MMT:  4- L  3+ R  *compensates using hip flexors    Spine Comments   Prone press ups: improved pain-free l/s motion    Therapeutic Exercises (CPT 71020):     3. review of hep    10. Full FR sequence: open book pec stretch, alt GH flexion, SA punches alternating, x15 ea, reviewed    11. Full FR t/s extension, 5-10x at mid and upper t/s, reviewed    12. shoulder extensions with dowel in standing, x10, reviewed; able to complete pain-free today    13. shoulder extensions with dowel in prone, x10    16. radial  and medial n. glides, x10 ea, reviewed; VC's to manage wrist position      Therapeutic Exercise Summary: Access Code: [email or web address]  URL: [email or web address]  Date: 01/08/2024  Prepared by: Gavin Pimentel    Exercises  -  "Bridge with Arms at Sides and Feet on Swiss Ball  - 1-2 x daily - 7 x weekly - 1-3 sets - 1-2 min  hold  - Prone Press Up  - 1-2 x daily - 7 x weekly - 1 sets - 10 reps  - Standing Thoracolumbar extension at wall  - 1-2 x daily - 7 x weekly - 1 sets - 10 reps      Pt performed these exercises with instruction and SPV.  Provided handout with these exercises for daily HEP.        Therapeutic Treatments and Modalities:     1. Manual Therapy (CPT 09439), x10 min    Therapeutic Treatment and Modalities Summary: B subscap release in SL with pillow barrier for modesty  Manual: STM to triceps R followed by cupping x 4 min    Application of 1 cup applied to tricep while resting  //erythema following treatment at site; no other adverse effects following treatment.        Time-based treatments/modalities:    Physical Therapy Timed Treatment Charges  Manual therapy minutes (CPT 48537): 10 minutes  Therapeutic exercise minutes (CPT 56360): 30 minutes    Pain rating (1-10) before treatment:  mild soreness at R side and central back; R hip pain  Pain rating (1-10) after treatment:  \"it's better\"      ASSESSMENT:   Response to treatment:   Repeat cupping due to pos response from last session; pt able to complete standing shoulder extensions with dowel without triceps pain- prev unable. Able to increase shoulder extension excursion following subscap release R>L. Suspect this is secondary to poor postural awareness and rounding of B shoulders. Will return to progression of core and prox pelvic girdle strengthening in upcoming sessions.     PLAN/RECOMMENDATIONS:   Plan for treatment: therapy treatment to continue next visit.  Planned interventions for next visit: continue with current treatment.  Assess response to cupping and new hep        "

## 2024-01-24 ENCOUNTER — APPOINTMENT (OUTPATIENT)
Dept: PHYSICAL THERAPY | Facility: MEDICAL CENTER | Age: 49
End: 2024-01-24
Attending: PHYSICAL MEDICINE & REHABILITATION
Payer: COMMERCIAL

## 2024-01-29 ENCOUNTER — APPOINTMENT (OUTPATIENT)
Dept: PHYSICAL THERAPY | Facility: MEDICAL CENTER | Age: 49
End: 2024-01-29
Attending: PHYSICAL MEDICINE & REHABILITATION
Payer: COMMERCIAL

## 2024-01-31 ENCOUNTER — PHYSICAL THERAPY (OUTPATIENT)
Dept: PHYSICAL THERAPY | Facility: MEDICAL CENTER | Age: 49
End: 2024-01-31
Attending: PHYSICAL MEDICINE & REHABILITATION
Payer: COMMERCIAL

## 2024-01-31 DIAGNOSIS — M25.552 CHRONIC HIP PAIN, BILATERAL: ICD-10-CM

## 2024-01-31 DIAGNOSIS — G89.29 CHRONIC HIP PAIN, BILATERAL: ICD-10-CM

## 2024-01-31 DIAGNOSIS — M25.551 CHRONIC HIP PAIN, BILATERAL: ICD-10-CM

## 2024-01-31 PROCEDURE — 97110 THERAPEUTIC EXERCISES: CPT

## 2024-01-31 PROCEDURE — 97140 MANUAL THERAPY 1/> REGIONS: CPT

## 2024-01-31 NOTE — OP THERAPY DAILY TREATMENT
"  Outpatient Physical Therapy  DAILY TREATMENT     [facility] Outpatient Physical Therapy  [address]  [place] [address]  Phone:  [phone]  Fax:  [phone]    Date: 01/31/2024    Patient: Fish Sandoval  YOB: 1975  MRN: [id number]     Time Calculation    Start time: 1633  Stop time: 1723 Time Calculation (min): 50 minutes         Chief Complaint: Hip Problem and Back Problem    Visit #: 5    SUBJECTIVE:  Pt reporting he had good response to the scapular lift from last session. Stating he continues to have some triceps pain with exercises at the gym. Back and hip continue to be aggravated with his yoga routine, hinging while seated and on the toilet. He noticed a big jump in improvement initially but has noticed no major changes in the pain recently.     OBJECTIVE:  Current objective measures:       Therapeutic Exercises (CPT 05357):     3. review of hep    12. shoulder extensions with dowel in standing, x10, reviewed; able to complete pain-free today    13. shoulder extensions with dowel in prone, x10, NT    16. radial  and medial n. glides, x10 ea, verbal review only    17. TRX rows, x15, hep    18. TRX lat pull downs and pull overs, x15, hep    19. TRX chest press    Therapeutic Treatments and Modalities:     1. Manual Therapy (CPT 67421), x10 min    2. Mechanical Traction (CPT 18788), 84/40# mech traction with mhp x10 min    Therapeutic Treatment and Modalities Summary: R subscap release in SL with pillow barrier for modesty          Time-based treatments/modalities:    Physical Therapy Timed Treatment Charges  Manual therapy minutes (CPT 10473): 9 minutes  Therapeutic exercise minutes (CPT 69839): 31 minutes    Pain rating (1-10) before treatment:  mild soreness at R side and central back; R hip pain  Pain rating (1-10) after treatment:  \"it's better\"      ASSESSMENT:   Response to treatment:   Trial of mechanical traction at 50% BW parameters; " may increase this in subsequent session if no adverse response. TRX ex today using BW with focus on eccentric training and stepping back into place due to pain with concentric portions. May consider cupping to triceps again next session or BFR training if cont'd complaints.        PLAN/RECOMMENDATIONS:   Plan for treatment: therapy treatment to continue next visit.  Planned interventions for next visit: continue with current treatment.  Assess response to traction and TRX ex

## 2024-02-05 ENCOUNTER — PHYSICAL THERAPY (OUTPATIENT)
Dept: PHYSICAL THERAPY | Facility: MEDICAL CENTER | Age: 49
End: 2024-02-05
Attending: PHYSICAL MEDICINE & REHABILITATION
Payer: COMMERCIAL

## 2024-02-05 DIAGNOSIS — M25.551 CHRONIC HIP PAIN, BILATERAL: ICD-10-CM

## 2024-02-05 DIAGNOSIS — M25.552 CHRONIC HIP PAIN, BILATERAL: ICD-10-CM

## 2024-02-05 DIAGNOSIS — G89.29 CHRONIC HIP PAIN, BILATERAL: ICD-10-CM

## 2024-02-05 PROCEDURE — 97110 THERAPEUTIC EXERCISES: CPT

## 2024-02-05 PROCEDURE — 97012 MECHANICAL TRACTION THERAPY: CPT

## 2024-02-05 NOTE — OP THERAPY DAILY TREATMENT
Outpatient Physical Therapy  DAILY TREATMENT     [facility] Outpatient Physical Therapy  [address]  [place] [address]  Phone:  [phone]  Fax:  [phone]    Date: 02/05/2024    Patient: Fish Sandoval  YOB: 1975  MRN: [id number]     Time Calculation    Start time: 1515  Stop time: 1618 Time Calculation (min): 63 minutes         Chief Complaint: Back Problem    Visit #: 6    SUBJECTIVE:  Pt reporting traction is in his top 3. Stating he feels lengthened and less back pressure when he performs a hinge or sitting. States traction pull was mild. Is ready to progress clamshell resistance.     OBJECTIVE:  Current objective measures:   Lumbar AROM:  Flexion: within functional limits (FT to toes; back improved; back improved)  Extension: decreased (hip improved; back worse; mod restricted)  Left lateral flexion: decreased  Right lateral flexion: decreased (SB to R worse than L; hip worse)  Left rotation: decreased  Right rotation: decreased (worse discomfort to R>L)    Therapeutic Exercises (CPT 70198):     3. review of hep    4. clamshells, green TB, x5    10. SL hip stabilityroutine; hip flex, hip abd, hip circles CW and CCW, 10x ea    12. shoulder extensions with dowel in standing, x10, reviewed; able to complete pain-free today    13. shoulder extensions with dowel in prone, x10, NT    16. radial  and medial n. glides, x10 ea, verbal review only    17. TRX rows, x15, DC-ed from hep    18. TRX lat pull downs and pull overs, x15, DC-ed from hep    19. TRX chest press, DC-ed from hep    Therapeutic Treatments and Modalities:     2. Mechanical Traction (CPT 83212), 100/50# mech traction with mhp x15 min    3. Mechanical Traction (CPT 90426), 20/10# c/s traction with mhp x15 min    Therapeutic Treatment and Modalities Summary:           Time-based treatments/modalities:    Physical Therapy Timed Treatment Charges  Therapeutic exercise minutes (CPT 99120): 33  "minutes    Pain rating (1-10) before treatment:  mild soreness at R side and central back; R hip pain  Pain rating (1-10) after treatment:  \"it's better\"      ASSESSMENT:   Response to treatment:   Fatigue and visible muscle juddering at pelvis during hip stability routine in SL B; no increase in symptoms. Due to pos response from traction, increased parameters and time from 10-> 15 min. Additional intervention of cervical traction to help rule out possible axillary nerve entrapment at triceps. Will assess response in upcoming session to determine further interventions.       PLAN/RECOMMENDATIONS:   Plan for treatment: therapy treatment to continue next visit.  Planned interventions for next visit: continue with current treatment.  Assess response to traction and TRX ex       "

## 2024-02-07 ENCOUNTER — OFFICE VISIT (OUTPATIENT)
Dept: INTERNAL MEDICINE | Facility: IMAGING CENTER | Age: 49
End: 2024-02-07
Payer: COMMERCIAL

## 2024-02-07 ENCOUNTER — APPOINTMENT (OUTPATIENT)
Dept: PHYSICAL THERAPY | Facility: MEDICAL CENTER | Age: 49
End: 2024-02-07
Attending: PHYSICAL MEDICINE & REHABILITATION
Payer: COMMERCIAL

## 2024-02-07 VITALS
TEMPERATURE: 98 F | RESPIRATION RATE: 12 BRPM | SYSTOLIC BLOOD PRESSURE: 110 MMHG | DIASTOLIC BLOOD PRESSURE: 70 MMHG | OXYGEN SATURATION: 97 % | HEART RATE: 68 BPM

## 2024-02-07 DIAGNOSIS — L29.0 ANAL ITCHING: ICD-10-CM

## 2024-02-07 DIAGNOSIS — N48.6 PEYRONIE'S DISEASE: ICD-10-CM

## 2024-02-07 DIAGNOSIS — K62.89 HYPERTROPHIED ANAL PAPILLA: ICD-10-CM

## 2024-02-07 DIAGNOSIS — N48.89 IRRITATION OF PENIS: ICD-10-CM

## 2024-02-07 DIAGNOSIS — K64.4 EXTERNAL HEMORRHOID: ICD-10-CM

## 2024-02-07 PROBLEM — G40.909 EPILEPSY, UNSPECIFIED, NOT INTRACTABLE, WITHOUT STATUS EPILEPTICUS (HCC): Status: ACTIVE | Noted: 2024-02-07

## 2024-02-07 PROBLEM — G47.30 SLEEP APNEA: Status: ACTIVE | Noted: 2024-02-07

## 2024-02-07 PROBLEM — K63.5 POLYP OF COLON: Status: ACTIVE | Noted: 2024-01-10

## 2024-02-07 PROCEDURE — 3074F SYST BP LT 130 MM HG: CPT | Performed by: INTERNAL MEDICINE

## 2024-02-07 PROCEDURE — 3078F DIAST BP <80 MM HG: CPT | Performed by: INTERNAL MEDICINE

## 2024-02-07 PROCEDURE — 99213 OFFICE O/P EST LOW 20 MIN: CPT | Performed by: INTERNAL MEDICINE

## 2024-02-07 NOTE — PROGRESS NOTES
Chief Complaint   Patient presents with    Anal Itching              HISTORY OF THE PRESENT ILLNESS: Patient is a 48 y.o. male.     Patient comes in complaining of anal irritation.  Symptoms began after recent colonoscopy.  He treated with topical triamcinolone with good results.  He is concerned because in the past he has had irritation that was focal and the most recent irritation was more diffuse.  Bowels are normal.  Colonoscopy found polyp but otherwise unremarkable.    He has a history of Peyronie'sdisease.  He is asking for refill of topical verapamil.  He has used this in the past with good results.        Allergies: Pcn [penicillins]    Current Outpatient Medications Ordered in Epic   Medication Sig Dispense Refill    TURMERIC PO Take 1 Cap by mouth.      vitamin D, cholecalciferol, 400 units Tab tablet Take 400 Units by mouth.      Cyanocobalamin (VITAMIN B-12) 1000 MCG Tab Take 1,000 mcg by mouth.      diphenhydrAMINE (BENADRYL) 25 MG capsule Take 25 mg by mouth.      Multiple Vitamin (MULTI-VITAMINS) Tab Take 1 Tab by mouth.      tadalafil (CIALIS) 5 MG tablet Cialis 5 mg tablet   TAKE 1 TABLET BY MOUTH EVERY DAY AS DIRECTED BY MD      mupirocin (BACTROBAN) 2 % Ointment Apply 1 Application to affected area(s) 2 times a day. 1 Tube 0     No current Epic-ordered facility-administered medications on file.       Past medical history, social history and family history were reviewed from chart today    Review of systems: Per HPI.    All others negative.     Exam: /70 (BP Location: Left arm, Patient Position: Sitting, BP Cuff Size: Adult)   Pulse 68   Temp 36.7 °C (98 °F) (Temporal)   Resp 12   SpO2 97%   General: Well-appearing. Well-developed. No signs of distress.  HEENT: Grossly normal. Oral cavity is pink and moist.   Neck: Supple without JVD or bruit.  Pulmonary: Clear with good breath sounds. Normal effort.  Cardiovascular: Regular. Carotid and radial pulses are intact.  Abdomen: Soft,  nontender, nondistended. Spleen and liver are not enlarged.  Neurologic: Cranial nerves II through XII are grossly normal, alert and oriented x3      Diagnosis:  1. Anal itching        2. External hemorrhoid        3. Hypertrophied anal papilla        4. Peyronie's disease        5. Irritation of penis          Okay to continue triamcinolone is needed to rectal area.  G.I. if symptoms persist.  Okay refill of topical verapamil  Discussed that if P&L irritation persist I would like him to follow-up so that we can check for STDs. I doubt he would test positive today if exposed due to exposure was less than 12 hours ago    My total time spent caring for the patient on the day of the encounter was  greater than 20 minutes.   This includes obtaining history, reviewing chart, physical exam, patient education, reviewing outside records, placing orders, interpreting tests and coordinating care.    Portions of this note were completed using voice recognition software (Dragon Naturally speaking software) . Occasional transcription errors may have escaped proof reading. I have made every reasonable attempt to correct obvious errors, but I expect that there are errors of grammar and possibly content that I did not discover before finalizing the note.

## 2024-02-12 ENCOUNTER — PHYSICAL THERAPY (OUTPATIENT)
Dept: PHYSICAL THERAPY | Facility: MEDICAL CENTER | Age: 49
End: 2024-02-12
Attending: PHYSICAL MEDICINE & REHABILITATION
Payer: COMMERCIAL

## 2024-02-12 DIAGNOSIS — M25.551 CHRONIC HIP PAIN, BILATERAL: ICD-10-CM

## 2024-02-12 DIAGNOSIS — M25.552 CHRONIC HIP PAIN, BILATERAL: ICD-10-CM

## 2024-02-12 DIAGNOSIS — G89.29 CHRONIC HIP PAIN, BILATERAL: ICD-10-CM

## 2024-02-12 PROCEDURE — 97012 MECHANICAL TRACTION THERAPY: CPT

## 2024-02-12 PROCEDURE — 97110 THERAPEUTIC EXERCISES: CPT

## 2024-02-12 NOTE — OP THERAPY DAILY TREATMENT
Outpatient Physical Therapy  DAILY TREATMENT     [facility] Outpatient Physical Therapy  [address]  [place] [address]  Phone:  [phone]  Fax:  [phone]    Date: 02/12/2024    Patient: Fish Sandoval  YOB: 1975  MRN: [id number]     Time Calculation    Start time: 1630  Stop time: 1720 Time Calculation (min): 50 minutes         Chief Complaint: Hip Problem    Visit #: 7    SUBJECTIVE:  Pt reporting traction with high mild on the cervical traction, believes the back traction was appropriate. Stating he may have set himself back by trying to do more core exercises on his own. Pt stating improvement in his ADL's including 30 min of prolonged standing.     Objective:    Therapeutic Exercises (CPT 21953):     3. review of hep    4. clamshells, green TB, x5    10. SL hip stabilityroutine; hip flex, hip abd, hip circles CW and CCW, 10x ea    12. shoulder extensions with dowel in standing, x10, reviewed; able to complete pain-free today    13. shoulder extensions with dowel in prone, x10, NT    16. radial  and medial n. glides, x10 ea, verbal review only    17. 90/90 hold with pink TB at ankles and suprapatellar, 2x1 min holds, fatigue; hep    18. stir the pot Qped, 30 sec x 2    19. WITYs, 5 sec hold; 50% reps with 1# dumbbell, fatigue in W position with dumbbell hold hep    20. walk away angels, pink TB to fatigue, hep    Therapeutic Treatments and Modalities:     3. Mechanical Traction (CPT 06972), 25/10# c/s traction with mhp x10 min    Therapeutic Treatment and Modalities Summary:           Time-based treatments/modalities:    Physical Therapy Timed Treatment Charges  Therapeutic exercise minutes (CPT 12065): 40 minutes        ASSESSMENT:   Response to treatment:   See PN    PLAN/RECOMMENDATIONS:   Plan for treatment: therapy treatment to continue next visit.  Planned interventions for next visit: continue with current treatment.

## 2024-02-13 NOTE — OP THERAPY PROGRESS SUMMARY
Outpatient Physical Therapy  PROGRESS SUMMARY NOTE      [facility] Outpatient Physical Therapy  [address]  [place] [address]  Phone:  [phone]  Fax:  [phone]    Date of Visit: 02/12/2024    Patient: Fish Sandoval  YOB: 1975  MRN: [id number]     Referring Provider: Tarik Whitlock M.D.  [address]  [address]  [place] [address]   Referring Diagnosis Pain in right hip [M25.551];Pain in left hip [M25.552];Other chronic pain [G89.29];Lumbago with sciatica, right side [M54.41];Radiculopathy, lumbar region [M54.16];Spondylosis without myelopathy or radiculopathy, lumbar region [M47.816]     Visit Diagnoses     ICD-10-CM   1. Chronic hip pain, bilateral  M25.551    M25.552    G89.29       Rehab Potential: good    Progress Report Period: 1/8/24-2/13/24    Functional Assessment Used  Grey Ilir Low Back Pain and Disability Score: 12.5       Objective Findings and Assessment:   Patient progression towards goals: Pt noting improvements with prolonged sitting, ability to complete his gym routine for upper body, and improved bed mobility and sleep hygiene. Is demonstrating improvements on RMQ objective measure. Suspect he may have radial nerve irritation as source of triceps pain due to neural tension found in prev session and improvements following cervical traction. Will continue to explore improvements with these treatments. Pt additionally responding well to core/prox pelvic girdle strengthening and  l/s traction with improved sitting tolerance and ability to hinge with decreased pressure and pain complaints. Will continue to emphasize these treatments in upcoming sessions.     Objective findings and assessment details: Active Range of Motion      Lumbar   Flexion: within functional limits (FT to toes; back improved; back improved)  Extension: decreased (hip improved; back worse; mod restricted)  Left lateral flexion: decreased  Right  lateral flexion: decreased (SB to R worse than L; hip worse)  Left rotation: decreased  Right rotation: decreased (worse discomfort to R>L)    Strength:  Hip strength within functional limits with the following exceptions: Supine LE extension test:  4-/5 L  3+/5 R       Goals:   Short Term Goals:   1. Pt will be independent with written HEP. (Met)  Short term goal time span:  2-4 weeks      Long Term Goals:    1. Pt will be independent with written HEP. (Ongoing)  2. Pt will have a sig improvement in RMQ >/= MDC (29.17) (Met)  3. Pt will be able to complete prolonged sitting with min to no modifications x 2 hours to improve work efficiency and ADL's. (Partially met; able to sit for an hour)  4. Pt will have 70% or greater improvement in bed mobility and sleep hygiene. (Partially met; 50-60% improved)    Long term goal time span:  6-8 weeks    Plan:   Planned therapy interventions:  Neuromuscular Re-education (CPT 28870), E Stim Unattended (CPT 64823), Manual Therapy (CPT 97733), Therapeutic Exercise (CPT 92233) and Mechanical Traction (CPT 30146)  Frequency: 1-2x/wk.  Duration in weeks:  8  Plan details:  UPOC: 3/16/24      Referring provider co-signature:  I have reviewed this plan of care and my co-signature certifies the need for services.     Certification Period: 02/12/2024 to 3/16/24    Physician Signature: ________________________________ Date: ______________

## 2024-02-14 ENCOUNTER — APPOINTMENT (OUTPATIENT)
Dept: PHYSICAL THERAPY | Facility: MEDICAL CENTER | Age: 49
End: 2024-02-14
Attending: PHYSICAL MEDICINE & REHABILITATION
Payer: COMMERCIAL

## 2024-02-14 NOTE — OP THERAPY DAILY TREATMENT
"  Outpatient Physical Therapy  DAILY TREATMENT     [facility] Outpatient Physical Therapy  [address]  [place] [address]  Phone:  [phone]  Fax:  [phone]    Date: 02/14/2024    Patient: Fish Sandoval  YOB: 1975  MRN: [id number]     Time Calculation                   Chief Complaint: No chief complaint on file.    Visit #: 8    SUBJECTIVE:  L LBP and R lateral hip pain, extension bias     OBJECTIVE:  Current objective measures: ***          Therapeutic Exercises (CPT 87014):     3. review of hep    4. clamshells, green TB, x5    10. SL hip stabilityroutine; hip flex, hip abd, hip circles CW and CCW, 10x ea    12. shoulder extensions with dowel in standing, x10, reviewed; able to complete pain-free today    13. shoulder extensions with dowel in prone, x10, NT    16. radial  and medial n. glides, x10 ea, verbal review only    17. 90/90 hold with pink TB at ankles and suprapatellar, 2x1 min holds, fatigue; hep    18. stir the pot Qped, 30 sec x 2    19. WITYs, 5 sec hold; 50% reps with 1# dumbbell, fatigue in W position with dumbbell hold hep    20. walk away angels, pink TB to fatigue, hep    Therapeutic Treatments and Modalities:     3. Mechanical Traction (CPT 40955), 25/10# c/s traction with mhp x10 min    Therapeutic Treatment and Modalities Summary:           Time-based treatments/modalities:           Pain rating (1-10) before treatment:  {PAIN NUMBERS_1-10:29915}  Pain rating (1-10) after treatment:  {PAIN NUMBERS_1-10:30970}    ASSESSMENT:   Response to treatment: ***    PLAN/RECOMMENDATIONS:   Plan for treatment: {AMB OP THERAPY - THERAPY PLAN:976345769::\"therapy treatment to continue next visit\"}.  Planned interventions for next visit: {PT PLANNED THERAPY INTERVENTIONS:251417960::\"continue with current treatment\"}.         "

## 2024-02-21 ENCOUNTER — PHYSICAL THERAPY (OUTPATIENT)
Dept: PHYSICAL THERAPY | Facility: MEDICAL CENTER | Age: 49
End: 2024-02-21
Attending: PHYSICAL MEDICINE & REHABILITATION
Payer: COMMERCIAL

## 2024-02-21 DIAGNOSIS — M25.551 CHRONIC HIP PAIN, BILATERAL: ICD-10-CM

## 2024-02-21 DIAGNOSIS — M25.552 CHRONIC HIP PAIN, BILATERAL: ICD-10-CM

## 2024-02-21 DIAGNOSIS — G89.29 CHRONIC HIP PAIN, BILATERAL: ICD-10-CM

## 2024-02-21 PROCEDURE — 97110 THERAPEUTIC EXERCISES: CPT

## 2024-02-21 PROCEDURE — 97012 MECHANICAL TRACTION THERAPY: CPT

## 2024-02-21 NOTE — OP THERAPY DAILY TREATMENT
Outpatient Physical Therapy  DAILY TREATMENT     [facility] Outpatient Physical Therapy  [address]  [place] [address]  Phone:  [phone]  Fax:  [phone]    Date: 02/21/2024    Patient: Fish Sandoval  YOB: 1975  MRN: [id number]     Time Calculation    Start time: 1331  Stop time: 1429 Time Calculation (min): 58 minutes         Chief Complaint: Hip Problem    Visit #: 8    SUBJECTIVE:  Pt noticing ability to complete weighted pull-ups and rows. Is ready for progression of bands. Is seldomly noticing the hip; will only noticing the pain with rotation to the L. The back is improved and has regressed about 30%. Duration of prolonged sitting has not sig improved but notices decreased pain intensity.     Objective:    Therapeutic Exercises (CPT 68351):     1. prone press ups, x10 +self OP using exhale x 5, slight increase in triceps and levator scap discomfort -return to baseline after brief rest break    3. review of hep, progressions with band    7. ball bridge+HS rolls, 2x10, hep    8. levator scap stretch, x30 sec, hep    9. ball bridge +SL lifts, x2, DC-ed due to back and hip strain    10. SL hip stability routine; hip flex, hip abd, hip circles CW and CCW, 10x ea, NT    12. shoulder extensions with dowel in standing, x10, NT    13. shoulder extensions with dowel in prone, x10, NT    16. radial  and medial n. glides, x10 ea, verbal review    17. 90/90 hold with pink TB at ankles and suprapatellar, 2x1 min holds, NT    18. stir the pot Qped, 30 sec x 2, NT    19. WITYs, x3 5 sec hold, reviewed; VC's    20. walk away angels, pink TB to fatigue, NT    Therapeutic Treatments and Modalities:     3. Mechanical Traction (CPT 21495), 25/10# c/s traction with mhp x10 min    4. Mechanical Traction (CPT 37320), 115/50# mech traction with mhp x10 min    Therapeutic Treatment and Modalities Summary:           Time-based treatments/modalities:    Physical Therapy  "Timed Treatment Charges  Therapeutic exercise minutes (CPT 92798): 38 minutes    Pain pre-treatment: 3/10 L side of low back   Pain post-treatment: \"feels better; fatigued\"      ASSESSMENT:   Response to treatment:   Modification of WITY's due to compensations using UT dominant pattern. Slight increase in lumbar traction parameter and repeat of c/s traction due to benefits found with ability to complete prev aggrav. Activities in the gym. Pt demonstrating increase in strength at core and prox pelvic girdle with ability to progress using incr resistance TB's. Pt unable to complete level II core stab without incr back pain with exception of ball bridge+HS rolls; will continue to progress slowly.      PLAN/RECOMMENDATIONS:   Plan for treatment: therapy treatment to continue next visit.  Planned interventions for next visit: continue with current treatment.         "

## 2024-02-28 ENCOUNTER — PHYSICAL THERAPY (OUTPATIENT)
Dept: PHYSICAL THERAPY | Facility: MEDICAL CENTER | Age: 49
End: 2024-02-28
Attending: PHYSICAL MEDICINE & REHABILITATION
Payer: COMMERCIAL

## 2024-02-28 DIAGNOSIS — M25.551 CHRONIC HIP PAIN, BILATERAL: ICD-10-CM

## 2024-02-28 DIAGNOSIS — G89.29 CHRONIC HIP PAIN, BILATERAL: ICD-10-CM

## 2024-02-28 DIAGNOSIS — M25.552 CHRONIC HIP PAIN, BILATERAL: ICD-10-CM

## 2024-02-28 PROCEDURE — 97110 THERAPEUTIC EXERCISES: CPT

## 2024-02-28 PROCEDURE — 97140 MANUAL THERAPY 1/> REGIONS: CPT

## 2024-02-28 PROCEDURE — 97012 MECHANICAL TRACTION THERAPY: CPT

## 2024-02-28 NOTE — OP THERAPY DAILY TREATMENT
Outpatient Physical Therapy  DAILY TREATMENT     [facility] Outpatient Physical Therapy  [address]  [place] [address]  Phone:  [phone]  Fax:  [phone]    Date: 02/28/2024    Patient: Fish Sandoval  YOB: 1975  MRN: [id number]     Time Calculation    Start time: 1551  Stop time: 1654 Time Calculation (min): 63 minutes         Chief Complaint: Back Problem and Arm Problem    Visit #: 9    SUBJECTIVE:  The gym is improving but not yet 100%. Believes the neck traction is helpful for opening up the back. Notices he shrugs his shoulders a lot for work, during gym workouts and when stressed. Is having back discomfort with prolonged sitting. Back feels improved and not as noticeable. More sensation of fatigue at l/s with occasional L sided or center-sided s/s back s/s. Turning to the right in bed will trigger the R side of hip.     Objective:  L/s ROM:  Flexion: initial motion; mild strain at L l/s and L lateral hip  Extension: min restricted  R SB: min restricted; R trap, R back and R hip pain  L SB: min restricted; L back pain  R rotation: min restricted; mid and lbp  L rotation: slight R hip pain and neck discomfort     R l/s LTR (knees to L): decrease; better; improved l/s ROM    Therapeutic Exercises (CPT 29829):     1. prone press ups, x10 +self OP using exhale x 5, slight increase in triceps and levator scap discomfort -return to baseline after brief rest break    3. review of hep, progressions with band    4. side bending, to L; x10 ea, increase; worse    5. side bending, to R; x10 ea, increase; no worse    6. R l/s LTR, x10 ea, knees to L decrease; better    19. WITYs, x3 5 sec hold, reviewed; VC's to obtain neutral neck position    Therapeutic Treatments and Modalities:     1. Manual Therapy (CPT 94563), see below, x12 min    3. Mechanical Traction (CPT 83455), 25/10# c/s traction with mhp x10 min    4. Mechanical Traction (CPT 65599), 115/50#  "mech traction with mhp x10 min    Therapeutic Treatment and Modalities Summary: Manual:  90/90 R l/s LTR (LE's to L) with manual OP 2x15 reps in supine lying //improved l/s ROM and decreased pain complaints following; able to abolish lateral hip pain    Time-based treatments/modalities:    Physical Therapy Timed Treatment Charges  Manual therapy minutes (CPT 38026): 12 minutes  Therapeutic exercise minutes (CPT 82135): 31 minutes    Pain pre-treatment: 2/10 R hip at lateral region  Pain post-treatment: \"feels better; fatigued\"      ASSESSMENT:   Response to treatment:   Suspect pt may have lateral component with his derangement secondary to improvements in rotation. Able to abolish lateral hip pain in session and improve overall pain-free ROM. Updated hep with R l/s rotations to assess s/s and ROM response; will review next session to determine continuation of rotational repetitive movements.    PLAN/RECOMMENDATIONS:   Plan for treatment: therapy treatment to continue next visit.  Planned interventions for next visit: continue with current treatment.         "

## 2024-03-06 ENCOUNTER — PHYSICAL THERAPY (OUTPATIENT)
Dept: PHYSICAL THERAPY | Facility: MEDICAL CENTER | Age: 49
End: 2024-03-06
Attending: INTERNAL MEDICINE
Payer: COMMERCIAL

## 2024-03-06 DIAGNOSIS — M25.551 CHRONIC HIP PAIN, BILATERAL: ICD-10-CM

## 2024-03-06 DIAGNOSIS — M54.41 CHRONIC RIGHT-SIDED LOW BACK PAIN WITH RIGHT-SIDED SCIATICA: ICD-10-CM

## 2024-03-06 DIAGNOSIS — G89.29 CHRONIC RIGHT-SIDED LOW BACK PAIN WITH RIGHT-SIDED SCIATICA: ICD-10-CM

## 2024-03-06 DIAGNOSIS — M25.552 CHRONIC HIP PAIN, BILATERAL: ICD-10-CM

## 2024-03-06 DIAGNOSIS — G89.29 CHRONIC HIP PAIN, BILATERAL: ICD-10-CM

## 2024-03-06 PROCEDURE — 97110 THERAPEUTIC EXERCISES: CPT

## 2024-03-06 PROCEDURE — 97140 MANUAL THERAPY 1/> REGIONS: CPT

## 2024-03-06 NOTE — OP THERAPY DAILY TREATMENT
Outpatient Physical Therapy  DAILY TREATMENT     [facility] Outpatient Physical Therapy  [address]  [place] [address]  Phone:  [phone]  Fax:  [phone]    Date: 03/06/2024    Patient: Fish Sandoval  YOB: 1975  MRN: [id number]     Time Calculation    Start time: 1416  Stop time: 1521 Time Calculation (min): 65 minutes         Chief Complaint: Back Problem and Hip Problem    Visit #: 10    SUBJECTIVE:  Pt reporting was having some low back discomfort with rotations at home but this has improved. Still experiences some discomfort at the hip pain but notices an uptrend. If he is able to sit with hip abduction, less pain than when sitting narrow. Is a back sleeper with pillow underneath the knees. Pt is resting in couch/bed x 3 hours and is getting up every 30 min- states he is having neck and mid back pain; improved in chair, sitting for 2 hours and getting up every 30 min with pulling in back but no mid back pain. He purchased a new  for rows and this produced some neck and upper back pain.    Objective:  L/s ROM:  L/s flexion: FT to ankles  Extension: min restricted  R SB: min restricted; 1 in from knees; discomfort in the back  and hip  L SB: x1 in from knees; min restricted: WFL   R rotation: min restricted; mid and lbp  L rotation: slight R hip pain and neck discomfort     R l/s LTR (knees to L): decrease; better; improved l/s ROM    Therapeutic Exercises (CPT 46086):     6. R l/s LTR, x10 ea, verbal review    8. pt education, sitting with pillow under the knees    9. L l/s side glides (R hips to wall), 2x10, //improved s/s with end range l/s motions    10. SL hip adduction, x10, incr weakness at LLE; hep    11. [place] hip adduction, x5 ea, sig weakness and difficulty; discontinued    19. WITYs, x3 5 sec hold, verbal review    Therapeutic Treatments and Modalities:     1. Manual Therapy (CPT 18265), see below, x8 min    3. Mechanical  Traction (CPT 85040), 25/10# c/s traction with mhp x10 min    4. Mechanical Traction (CPT 40617), 115/50# mech traction with mhp x10 min    Therapeutic Treatment and Modalities Summary: Manual:  90/90 R l/s LTR (LE's to L) with manual OP x10 reps in supine lying     Time-based treatments/modalities:    Physical Therapy Timed Treatment Charges  Manual therapy minutes (CPT 49609): 8 minutes  Therapeutic exercise minutes (CPT 49499): 37 minutes    Pain pre-treatment: R hip and l/s discomfort   Pain post-treatment: 0/10      ASSESSMENT:   Response to treatment:   Pt reporting increase in pain with sitting at incline vs. Sitting in normal chair; discussed trial of pillow under knees in addition to taking picture for therapist to help troubleshoot increases in pain. Pt responding well to rotational manual and ex. Weakness at L hip adductors noted, will continue strengthening globally at prox pelvic girdle. He additionally demos restricted unilateral side gliding suggestive for lateral derangement. Will continue to explore response to side glide and rotational ex/manual.       PLAN/RECOMMENDATIONS:   Plan for treatment: therapy treatment to continue next visit.  Planned interventions for next visit: continue with current treatment.

## 2024-03-11 ENCOUNTER — PHYSICAL THERAPY (OUTPATIENT)
Dept: PHYSICAL THERAPY | Facility: MEDICAL CENTER | Age: 49
End: 2024-03-11
Attending: INTERNAL MEDICINE
Payer: COMMERCIAL

## 2024-03-11 DIAGNOSIS — M54.41 CHRONIC RIGHT-SIDED LOW BACK PAIN WITH RIGHT-SIDED SCIATICA: ICD-10-CM

## 2024-03-11 DIAGNOSIS — M25.551 CHRONIC HIP PAIN, BILATERAL: ICD-10-CM

## 2024-03-11 DIAGNOSIS — G89.29 CHRONIC RIGHT-SIDED LOW BACK PAIN WITH RIGHT-SIDED SCIATICA: ICD-10-CM

## 2024-03-11 DIAGNOSIS — M25.552 CHRONIC HIP PAIN, BILATERAL: ICD-10-CM

## 2024-03-11 DIAGNOSIS — G89.29 CHRONIC HIP PAIN, BILATERAL: ICD-10-CM

## 2024-03-11 PROCEDURE — 97012 MECHANICAL TRACTION THERAPY: CPT

## 2024-03-11 PROCEDURE — 97110 THERAPEUTIC EXERCISES: CPT

## 2024-03-11 NOTE — OP THERAPY DAILY TREATMENT
Outpatient Physical Therapy  DAILY TREATMENT     [facility] Outpatient Physical Therapy  [address]  [place] [address]  Phone:  [phone]  Fax:  [phone]    Date: 03/11/2024    Patient: Fish Sandoval  YOB: 1975  MRN: [id number]     Time Calculation    Start time: 1625  Stop time: 1720 Time Calculation (min): 55 minutes         Chief Complaint: Back Problem    Visit #: 11    SUBJECTIVE:  Pt reporting hip feels a lot better. Stating back feels better. Pt can sit for any length of time but will feel pain with attempts to standing. Will stand every 30-35 min. R hip will cause pain with turning over in bed; otherwise improved. Otherwise notices stiffness. Pt noticing increased discomfort in the neck and will occasionally have triceps pain but improved.         Therapeutic Exercises (CPT 31184):     11. seated hip add+IR, x15 with 1-2 sec hold; green TB, hep    13. review of hep    14. objective measures    15. pt education, re: modification of home environment    Therapeutic Treatments and Modalities:     3. Mechanical Traction (CPT 63762), 25/10# c/s traction with mhp x10 min    4. Mechanical Traction (CPT 02940), 115/50# mech traction with mhp x10 min    Time-based treatments/modalities:    Physical Therapy Timed Treatment Charges  Therapeutic exercise minutes (CPT 90237): 35 minutes    Pain pre-treatment: R hip and l/s discomfort   Pain post-treatment: 0/10      ASSESSMENT:   Response to treatment:   See PN      PLAN/RECOMMENDATIONS:   Plan for treatment: therapy treatment to continue next visit.  Planned interventions for next visit: continue with current treatment.

## 2024-03-12 NOTE — OP THERAPY PROGRESS SUMMARY
Outpatient Physical Therapy  PROGRESS SUMMARY NOTE      [facility] Outpatient Physical Therapy  [address]  [place] [address]  Phone:  [phone]  Fax:  [phone]    Date of Visit: 03/11/2024    Patient: Fish Sandoval  YOB: 1975  MRN: [id number]     Referring Provider: Tarik Whitlock M.D.  [address]  [address]  [place] [address]   Referring Diagnosis Pain in right hip [M25.551];Pain in left hip [M25.552];Lumbago with sciatica, right side [M54.41]     Visit Diagnoses     ICD-10-CM   1. Chronic hip pain, bilateral  M25.551    M25.552    G89.29   2. Chronic right-sided low back pain with right-sided sciatica  M54.41    G89.29       Rehab Potential: good    Progress Report Period: 2/12/24-3/11/24    Functional Assessment Used  Grey Ilir Low Back Pain and Disability Score: 16.67       Objective Findings and Assessment:   Patient progression towards goals: Pt has been seen for 11 visits overall. Pt noting improvements with prolonged sitting with decreased pain intensity, but continuing to experience discomfort after about 30-35 min of sitting. Pt able to complete his gym routine for upper body with some modifications due to triceps pain, and improved bed mobility, and sleep hygiene. Has reported decrease in triceps pain with pain now located at lateral R neck intermittently; this provides further support that triceps pain may indeed be radicular. Prev discussion about modifying environment (couch/bed) to increase comfort in bed; further recommendations made today and advised pt to bring in picture of his normal work desk. Pt additionally responding well to core/prox pelvic girdle strengthening, c/s traction and  l/s traction with improved sitting tolerance and ability to hinge with decreased pressure and pain complaints. Suspect pt may have lateral derangement based on response in prior sessions and is responding well to repeated side  glide and rotations. Will continue to emphasize these treatments in upcoming sessions. Pt may continue to benefit from a few more sessions of skilled PT to address above impairments and transition towards indep hep and DC.     Objective findings and assessment details: Objective:  L/s ROM:  L/s flexion: FT to ankles  Extension: min restricted  R SB: min restricted; 1 in from knees; discomfort in the back  and hip  L SB: x1 in from knees; min restricted: WFL   R rotation: min restricted; mid and lbp  L rotation: slight R hip pain and neck discomfort     R l/s LTR (knees to L): decrease; better; improved l/s ROM    Poor postural awareness; forward head and rounded shoulders      Goals:   Short Term Goals:   1. Pt will be independent with written HEP. (Met)  2. Pt will be indep with modifying his environment for comfort (NEW)  3. Therapist to further evaluate c/s (NEW)  Short term goal time span:  2-4 weeks      Long Term Goals:    1. Pt will be independent with written HEP. (Ongoing)  2. Pt will have a sig improvement in RMQ >/= MDC (29.17) (Met)  3. Pt will be able to complete prolonged sitting with min to no modifications x 2 hours to improve work efficiency and ADL's. (Partially met; able to sit for an hour)  4. Pt will have 70% or greater improvement in bed mobility and sleep hygiene. (Partially met; 50-60% improved)  5. Pt will be able to demonstrate appropriate hinge consistently without reproduction of s/s at least 75% of the time or greater.  Long term goal time span:  6-8 weeks    Plan:   Planned therapy interventions:  Neuromuscular Re-education (CPT 12012), E Stim Unattended (CPT 27997), Manual Therapy (CPT 67405) and Therapeutic Exercise (CPT 74393)  Frequency:  1x week  Duration in weeks:  6  Plan details:  UPOC: 4/26/24      Referring provider co-signature:  I have reviewed this plan of care and my co-signature certifies the need for services.     Certification Period: 03/11/2024 to 4/26/24    Physician  Signature: ________________________________ Date: ______________

## 2024-03-20 ENCOUNTER — PHYSICAL THERAPY (OUTPATIENT)
Dept: PHYSICAL THERAPY | Facility: MEDICAL CENTER | Age: 49
End: 2024-03-20
Attending: INTERNAL MEDICINE
Payer: COMMERCIAL

## 2024-03-20 DIAGNOSIS — M54.41 CHRONIC RIGHT-SIDED LOW BACK PAIN WITH RIGHT-SIDED SCIATICA: ICD-10-CM

## 2024-03-20 DIAGNOSIS — G89.29 CHRONIC HIP PAIN, BILATERAL: ICD-10-CM

## 2024-03-20 DIAGNOSIS — M25.551 CHRONIC HIP PAIN, BILATERAL: ICD-10-CM

## 2024-03-20 DIAGNOSIS — M25.552 CHRONIC HIP PAIN, BILATERAL: ICD-10-CM

## 2024-03-20 DIAGNOSIS — G89.29 CHRONIC RIGHT-SIDED LOW BACK PAIN WITH RIGHT-SIDED SCIATICA: ICD-10-CM

## 2024-03-20 PROCEDURE — 97012 MECHANICAL TRACTION THERAPY: CPT

## 2024-03-20 PROCEDURE — 97110 THERAPEUTIC EXERCISES: CPT

## 2024-03-20 PROCEDURE — 97140 MANUAL THERAPY 1/> REGIONS: CPT

## 2024-03-20 NOTE — OP THERAPY DAILY TREATMENT
Outpatient Physical Therapy  DAILY TREATMENT     [facility] Outpatient Physical Therapy  [address]  [place] [address]  Phone:  [phone]  Fax:  [phone]    Date: 03/20/2024    Patient: Fish Sandoval  YOB: 1975  MRN: [id number]     Time Calculation    Start time: 1331  Stop time: 1432 Time Calculation (min): 61 minutes         Chief Complaint: Hip Problem and Back Problem    Visit #: 12    SUBJECTIVE:  Pt reporting no significant changes with moving l/s support up higher to t/s. Is feeling triceps discomfort occasionally on T-bar row and weighted pull ups but more often in the neck. Also noticing pain wrapping around from the spine and into the neck. Exercises for hip and back are going well. Continues to get back strain with hinging and prolonged sitting. Symptoms will last for duration of the hinge or for 1-2 minutes. Notices some discomfort with hinging. Will also have difficulties with getting comfortable. With sitting he is getting up to move every hour.       Objective:   C/s screening:   Flexion: 1 in from chest; decrease in neck, no change shoulder blades  Extension: WFL; increase in neck, better rhomboids  SB 38 deg to R (neck and rhomboids unchanged); 23 deg to L (worse neck and rhomboids)  Rotation: 49 to L (neck same, rhomboid worse); 70 to R (neck and rhomboids worse)  Cervical retraction: limited; (neck unchanged, rhomboids worse)   Repeated c/s retraction: (increase; no better)     Special tests:  Neural tension screening:    Objective findings and assessment details: Objective:  L/s ROM:  L/s flexion: FT to ankles  Extension: min restricted  R SB: min restricted; 1 in from knees; discomfort in the back  and hip  L SB: x1 in from knees; min restricted: WFL   R rotation: min restricted; mid and lbp  L rotation: slight R hip pain and neck discomfort      R l/s LTR (knees to L): decrease; better; improved l/s ROM     Poor postural  awareness; forward head and rounded shoulders       Therapeutic Exercises (CPT 80627): , DC-ed side glides from hep    11. seated hip add+IR, x15 with 1-2 sec hold; green TB, hep    13. review of hep    14. objective measures    15. pt education, re: modification of home environment    16. FR sequence: open book, GH flexion, SA punches, t/s extensions, 10x ea    Therapeutic Treatments and Modalities:     3. Mechanical Traction (CPT 40087), 25/10# c/s traction with mhp x10 min    4. Mechanical Traction (CPT 46975), 115/50# mech traction with mhp x10 min    Time-based treatments/modalities:    Physical Therapy Timed Treatment Charges  Manual therapy minutes (CPT 02635): 12 minutes  Therapeutic exercise minutes (CPT 62751): 34 minutes    Pain pre-treatment: 0/10 l/s discomfort, stiffness in R hip; 3/10 c/s to UT discomfort and discomfort in R rhomboids    Pain post-treatment: 0/10      ASSESSMENT:   Response to treatment:   Decrease in s/s with hip hinge (LBP) and able to abolish rhomboid and UT discomfort following manual and mobility ex target from CTJ-T10; most restricted in mid t/s region. Does continue to demo limited c/s SB cindy to L; will assess first rib restrictions next session.    PLAN/RECOMMENDATIONS:   Plan for treatment: therapy treatment to continue next visit.  Planned interventions for next visit: continue with current treatment.  Assess first rib, double lacrosse balls at c/s   Assess response to updated FR sequence and double lacrosse balls at t/s

## 2024-03-25 ENCOUNTER — PHYSICAL THERAPY (OUTPATIENT)
Dept: PHYSICAL THERAPY | Facility: MEDICAL CENTER | Age: 49
End: 2024-03-25
Attending: INTERNAL MEDICINE
Payer: COMMERCIAL

## 2024-03-25 DIAGNOSIS — M54.41 CHRONIC RIGHT-SIDED LOW BACK PAIN WITH RIGHT-SIDED SCIATICA: ICD-10-CM

## 2024-03-25 DIAGNOSIS — G89.29 CHRONIC RIGHT-SIDED LOW BACK PAIN WITH RIGHT-SIDED SCIATICA: ICD-10-CM

## 2024-03-25 DIAGNOSIS — G89.29 CHRONIC HIP PAIN, BILATERAL: ICD-10-CM

## 2024-03-25 DIAGNOSIS — M25.551 CHRONIC HIP PAIN, BILATERAL: ICD-10-CM

## 2024-03-25 DIAGNOSIS — M25.552 CHRONIC HIP PAIN, BILATERAL: ICD-10-CM

## 2024-03-25 PROCEDURE — 97110 THERAPEUTIC EXERCISES: CPT

## 2024-03-25 NOTE — OP THERAPY DAILY TREATMENT
Outpatient Physical Therapy  DAILY TREATMENT     [facility] Outpatient Physical Therapy  [address]  [place] [address]  Phone:  [phone]  Fax:  [phone]    Date: 03/25/2024    Patient: Fish Sandoval  YOB: 1975  MRN: [id number]     Time Calculation    Start time: 1500  Stop time: 1559 Time Calculation (min): 59 minutes         Chief Complaint: Back Problem and Hip Problem    Visit #: 13    SUBJECTIVE:  Pt reporting has inclined the bed a little more. Pt reporting hip and back have been feeling pretty good. Back is a little sore today. Stating hinge has been improved with exception of recently bending forward. Triceps is rated about a 1/10, R hip 2/10, neck 4/10 and back 4-5.10. Rhomboid discomfort 3/10 has transitioned to axilla and lat R.     Objective:   Cervical rotation lateral flexion: equal B; no reproduction of s/s        Therapeutic Exercises (CPT 27040): , DC-ed side glides from hep    11. seated hip add+IR, x15 with 1-2 sec hold; green TB, hep    13. review of hep    15. pt education, re: modification of home environment    16. FR sequence: open book, GH flexion, SA punches, 10x ea, reviewed    17. double lacrosse balls at wall, x5 min, reviewed    Therapeutic Treatments and Modalities:     3. Mechanical Traction (CPT 98304), 25/10# c/s traction with mhp x10 min    4. Mechanical Traction (CPT 64930), 115/50# mech traction with mhp x10 min    Time-based treatments/modalities:    Physical Therapy Timed Treatment Charges  Therapeutic exercise minutes (CPT 14639): 39 minutes    Pain pre-treatment: 0/10 l/s discomfort, stiffness in R hip; 3/10 c/s to UT discomfort and discomfort in R rhomboids    Pain post-treatment: 0/10      ASSESSMENT:   Response to treatment:   Modified hep due to increased neck discomfort; suspect this is due to FR and hypomobile state of t/s; discussed using softer surface such as rolled up large towel or rug and seated  t/s extension version-utilized softer mat in session with good tolerance and decreased neck/rhomboid pain in session. Extensive education re: medical need and insurance requirements for skilled physical therapy continuance.  Pt is on good progression and will continue 1x/wk.       PLAN/RECOMMENDATIONS:   Plan for treatment: therapy treatment to continue next visit.  Planned interventions for next visit: continue with current treatment.

## 2024-03-27 ENCOUNTER — APPOINTMENT (OUTPATIENT)
Dept: PHYSICAL THERAPY | Facility: MEDICAL CENTER | Age: 49
End: 2024-03-27
Payer: COMMERCIAL

## 2024-04-15 ENCOUNTER — OFFICE VISIT (OUTPATIENT)
Dept: INTERNAL MEDICINE | Facility: IMAGING CENTER | Age: 49
End: 2024-04-15
Payer: COMMERCIAL

## 2024-04-15 VITALS
TEMPERATURE: 98.4 F | RESPIRATION RATE: 12 BRPM | DIASTOLIC BLOOD PRESSURE: 64 MMHG | OXYGEN SATURATION: 98 % | SYSTOLIC BLOOD PRESSURE: 104 MMHG | HEART RATE: 64 BPM

## 2024-04-15 DIAGNOSIS — M79.12 STERNOCLEIDOMASTOID MUSCLE TENDERNESS: ICD-10-CM

## 2024-04-15 PROCEDURE — 3074F SYST BP LT 130 MM HG: CPT | Performed by: INTERNAL MEDICINE

## 2024-04-15 PROCEDURE — 3078F DIAST BP <80 MM HG: CPT | Performed by: INTERNAL MEDICINE

## 2024-04-15 PROCEDURE — 99213 OFFICE O/P EST LOW 20 MIN: CPT | Performed by: INTERNAL MEDICINE

## 2024-04-16 DIAGNOSIS — G47.33 OSA (OBSTRUCTIVE SLEEP APNEA): ICD-10-CM

## 2024-04-29 ENCOUNTER — PHYSICAL THERAPY (OUTPATIENT)
Dept: PHYSICAL THERAPY | Facility: MEDICAL CENTER | Age: 49
End: 2024-04-29
Attending: INTERNAL MEDICINE
Payer: COMMERCIAL

## 2024-04-29 DIAGNOSIS — M54.41 CHRONIC RIGHT-SIDED LOW BACK PAIN WITH RIGHT-SIDED SCIATICA: ICD-10-CM

## 2024-04-29 DIAGNOSIS — G89.29 CHRONIC RIGHT-SIDED LOW BACK PAIN WITH RIGHT-SIDED SCIATICA: ICD-10-CM

## 2024-04-29 DIAGNOSIS — M25.552 CHRONIC HIP PAIN, BILATERAL: ICD-10-CM

## 2024-04-29 DIAGNOSIS — M25.551 CHRONIC HIP PAIN, BILATERAL: ICD-10-CM

## 2024-04-29 DIAGNOSIS — G89.29 CHRONIC HIP PAIN, BILATERAL: ICD-10-CM

## 2024-04-29 PROCEDURE — 97012 MECHANICAL TRACTION THERAPY: CPT

## 2024-04-29 PROCEDURE — 97140 MANUAL THERAPY 1/> REGIONS: CPT

## 2024-04-29 PROCEDURE — 97110 THERAPEUTIC EXERCISES: CPT

## 2024-04-30 NOTE — OP THERAPY PROGRESS SUMMARY
Outpatient Physical Therapy  PROGRESS SUMMARY NOTE      [facility] Outpatient Physical Therapy  [address]  [place] [address]  Phone:  [phone]  Fax:  [phone]    Date of Visit: 04/29/2024    Patient: Fish Sandoval  YOB: 1975  MRN: [id number]     Referring Provider: Tarik Whitlock M.D.  [address]  [address]  [place] [address]   Referring Diagnosis Pain in right hip [M25.551];Pain in left hip [M25.552];Lumbago with sciatica, right side [M54.41]     Visit Diagnoses     ICD-10-CM   1. Chronic hip pain, bilateral  M25.551    M25.552    G89.29   2. Chronic right-sided low back pain with right-sided sciatica  M54.41    G89.29       Rehab Potential: good/fair    Progress Report Period: 3/11/24-4/30/24    Functional Assessment Used  Grey Ilir Low Back Pain and Disability Score: 20.83       Objective Findings and Assessment:   Patient progression towards goals: Pt has been seen for 11 of 12 approved visits overall. Pt noting improvements with prolonged sitting with decreased pain intensity, but continuing to experience discomfort after about 30-35 min of sitting. Pt able to complete his gym routine for upper body and no longer experiencing triceps pain. Pt however noting he has been experiencing R collarbone pain after completing cervical retractions 3 weeks ago. Pt reporting he feels relief with his hip strengthening routine and notices the difference with decreased compliance. PT thus far has consisted of manual treatment, traction, neural glides, repeated movements, stretching and strengthening. Multiple body regions have been addressed in sessions due to hindrance to current POC. Will continue to emphasize core, back, and hip impairments for a few additional visits in follow ups and obtain new referral to address other regions more specifically if needed.    Objective findings and assessment details: R l/s LTR (knees to L): decrease;  better; improved l/s ROM    Poor postural awareness; forward head and rounded shoulders    Lumbar ROM:  Hip 2.5-3/10; back 3.4/10 hip at baseline in standing position   L/s ROM:  Flexion: FT to toes; stiffness in l/s (incr in s/s)   Extension: min restricted; improved back s/s  SB: restricted R>L; discomfort in R l/s  Rotation: restricted to L>R; pain to R      Cervical ROM:  Flexion: 1 in from chest  Extension: WFL  SB: 21 to L; 33 to R; pain with L sidebending in collarbone  Rotation: 55 to R; 45 to L; discomfort in the neck only   *L SB reproduces collarbone pain           Goals:   Short Term Goals:   1. Pt will be independent with written HEP. (Met)  2. Pt will be indep with modifying his environment for comfort (Met)  3. Therapist to further evaluate c/s (Met)    Short term goal time span:  2-4 weeks      Long Term Goals:    1. Pt will be independent with written HEP. (Ongoing)  2. Pt will have a sig improvement in RMQ >/= MDC (29.17) (Met)  3. Pt will be able to complete prolonged sitting with min to no modifications x 2 hours to improve work efficiency and ADL's. (Partially met; able to sit for an hour)  4. Pt will have 70% or greater improvement in bed mobility and sleep hygiene. (Partially met; 50-60% improved)  5. Pt will be able to demonstrate appropriate hinge consistently without reproduction of s/s at least 75% of the time or greater. (Ongoing goal)     Long term goal time span:  6-8 weeks    Plan:   Planned therapy interventions:  Neuromuscular Re-education (CPT 95211), Manual Therapy (CPT 91617), Therapeutic Exercise (CPT 22589) and Gait Training (CPT 43368)  Frequency:  1x week  Duration in visits:  5  Plan details:  UPOC: 6/28/24    *POC extended to allot for difficulties with scheduling in busy clinic      Referring provider co-signature:  I have reviewed this plan of care and my co-signature certifies the need for services.     Certification Period: 04/29/2024 to 6/28/24    Physician Signature:  ________________________________ Date: ______________

## 2024-05-06 ENCOUNTER — PHYSICAL THERAPY (OUTPATIENT)
Dept: PHYSICAL THERAPY | Facility: MEDICAL CENTER | Age: 49
End: 2024-05-06
Attending: PHYSICAL MEDICINE & REHABILITATION
Payer: COMMERCIAL

## 2024-05-06 DIAGNOSIS — M54.41 CHRONIC RIGHT-SIDED LOW BACK PAIN WITH RIGHT-SIDED SCIATICA: ICD-10-CM

## 2024-05-06 DIAGNOSIS — G89.29 CHRONIC HIP PAIN, BILATERAL: ICD-10-CM

## 2024-05-06 DIAGNOSIS — M25.551 CHRONIC HIP PAIN, BILATERAL: ICD-10-CM

## 2024-05-06 DIAGNOSIS — G89.29 CHRONIC RIGHT-SIDED LOW BACK PAIN WITH RIGHT-SIDED SCIATICA: ICD-10-CM

## 2024-05-06 DIAGNOSIS — M25.552 CHRONIC HIP PAIN, BILATERAL: ICD-10-CM

## 2024-05-06 NOTE — OP THERAPY DAILY TREATMENT
Outpatient Physical Therapy  DAILY TREATMENT     [facility] Outpatient Physical Therapy  [address]  [place] [address]  Phone:  [phone]  Fax:  [phone]    Date: 05/06/2024    Patient: Fish Sandoval  YOB: 1975  MRN: [id number]     Time Calculation    Start time: 1546  Stop time: 1647 Time Calculation (min): 61 minutes         Chief Complaint: Back Problem and Hip Problem    Visit #: 11    SUBJECTIVE:  Pt reporting manual from last session seemed to help with the collarbone pain. This has improved motion and overhead movements with arms. Overall, 60-65% improved from previous session. The back/hip complex had tremendous improvement since PT initiated but has been reaching plateau. Pt is completing acupuncture and chiropractics, PT has been the most helpful for this area. Pt reporting he feels he has UT discomfort after the WITY exercise.     Pt reporting concerns re: discontinuing PT in the future, asking several questions during session re: private practice options, PT and pt rapport in re to discontinuing PT, referral and visits, different insurance options to obtain more PT visits.    Objective:   ROM assessed in standing  R shoulder flexion-pre treatment:   117 deg; ERP at collarbone     R shoulder flexion -post treatment:  135 deg (40-50%) after initial manual  140 deg after second manual    Therapeutic Exercises (CPT 86517):     7. objective measures    11. seated hip add+IR, x15 with 1-2 sec hold; green TB, NT    13. review of hep    15. pt education, re: modification of home environment, reviewed    16. levator scap stretch and scalene stretch, verbal review; HO provided for stretching to c/s    Therapeutic Treatments and Modalities:     2. Manual Therapy (CPT 54742), see below    3. Mechanical Traction (CPT 28248), 25/10# c/s traction with mhp x15 min    4. Mechanical Traction (CPT 41329), 115/50# mech traction with mhp x15 min     Therapeutic Treatment and Modalities Summary: CPA to CTJ-T10 and rotational mobs Gr III in prone lying. Then, STM to levator scap R, scapular mobs all directions gr III and subscap release x5 in SL (pillow barrier for modesty) //improved shoulder flexion ROM following with decr collar bone pain.     Lastly, STM to L SCM, ant, middle and posterior scalenes, L first rib inferior mobs gr 3, SCJ inf and post mobs gr 3 //improved shoulder flexion ROM following with decr collar bone pain.     Time-based treatments/modalities:    Physical Therapy Timed Treatment Charges  Manual therapy minutes (CPT 46688): 26 minutes  Therapeutic exercise minutes (CPT 25533): 20 minutes    Pain pre-treatment: 3.5/10 R collarbone   Pain post-treatment: 60% better      ASSESSMENT:   Response to treatment:   Pt demonstrating improved ROM following manual, most benefits seen with addressing thoracic spine -pt may benefit from repeat manual to this area.     Discussion with pt today with re: continued PT care. Pt requesting to continue to focus on hip/back as primary diagnoses-will return to initial POC. Please see progress note for details. Will submit for additional visits.    Of note, pt inquired if his personality is a reason to discharge from PT services. This PT reassured pt that discharge from PT is based on clinical parameters: when unable to progress or reaching plateau. Pt educated that this is the ethical undertaking of all physical therapist regardless of private practice vs. A hospital-based establishment. Further educated that all insurance agencies function to cover billing for functional changes in skilled PT and do not cover maintenance. This conversation has been repeated in multiple sessions with different explanations; may need to continue to educate pt in future.      PLAN/RECOMMENDATIONS:   Plan for treatment: therapy treatment to continue next visit.  Planned interventions for next visit: continue with current  treatment.

## 2024-05-07 NOTE — OP THERAPY PROGRESS SUMMARY
Outpatient Physical Therapy  PROGRESS SUMMARY NOTE      [facility]patient Physical Therapy  [address]  [place] [address]  Phone:  [phone]  Fax:  [phone]    Date of Visit: 05/06/2024    Patient: Fish Sandoval  YOB: 1975  MRN: [id number]     Referring Provider: Tarik Whitlock M.D.  [address]  [address]  [place] [address]   Referring Diagnosis Pain in right hip [M25.551];Pain in left hip [M25.552];Lumbago with sciatica, right side [M54.41]     Visit Diagnoses     ICD-10-CM   1. Chronic hip pain, bilateral  M25.551    M25.552    G89.29   2. Chronic right-sided low back pain with right-sided sciatica  M54.41    G89.29       Rehab Potential: good/fair    Progress Report Period: 3/11/24-5/7/24    Functional Assessment Used          Objective Findings and Assessment:   Patient progression towards goals: Pt has been seen for 12 of 12 approved visits overall. Pt noting improvements with prolonged sitting with decreased pain intensity, but continuing to experience discomfort after about 30-35 min of sitting. Pt able to complete his gym routine for upper body and no longer experiencing triceps pain. Pt however noting he has been experiencing R collarbone pain after completing cervical retractions 3 weeks ago. Pt reporting he feels relief with his hip strengthening routine and notices the difference with decreased compliance. PT thus far has consisted of manual treatment, traction, neural glides, repeated movements, stretching and strengthening. Multiple body regions have been addressed in sessions due to hindrance to current POC. Will continue to emphasize core, back, and hip impairments for a few additional visits in follow ups and obtain new referral to address other regions more specifically if needed.    Recommending additional visits to address back and hip complex to work on strengthening progression, endurance, and activity  tolerance.      Of note, Pt reporting concerns re: discontinuing PT in the future, asking several questions during session re: private practice options, PT and pt rapport in re to discontinuing PT, referral and visits, different insurance options to obtain more PT visits. Pt inquired if his normal habitus or personality is a reason to discharge from PT services. This PT reassured pt that discharge from PT is based on clinical parameters: when unable to progress or reaching plateau. Pt educated that this is the ethical undertaking of all physical therapist regardless of private practice vs. A hospital-based establishment. Further educated that all insurance agencies function to cover billing for functional changes in skilled PT and do not cover maintenance. This conversation has been repeated in multiple sessions with different explanations; may need to continue to educate pt in future.    Objective findings and assessment details: R l/s LTR (knees to L): decrease; better; improved l/s ROM    Poor postural awareness; forward head and rounded shoulders    Lumbar ROM:  Hip 2.5-3/10; back 3.4/10 hip at baseline in standing position   L/s ROM:  Flexion: FT to toes; stiffness in l/s (incr in s/s)   Extension: min restricted; improved back s/s  SB: restricted R>L; discomfort in R l/s  Rotation: restricted to L>R; pain to R      Cervical ROM:  Flexion: 1 in from chest  Extension: WFL  SB: 21 to L; 33 to R; pain with L sidebending in collarbone  Rotation: 55 to R; 45 to L; discomfort in the neck only   *L SB reproduces collarbone pain     ROM assessed in standing  R shoulder flexion-pre treatment:   117 deg; ERP at collarbone     R shoulder flexion -post treatment:  135 deg (40-50%) after initial manual  140 deg after second manual            Goals:   Short Term Goals:   1. Pt will be independent with written HEP. (Met)  2. Pt will be indep with modifying his environment for comfort (Met)  3. Therapist to further evaluate  c/s (Met)    Short term goal time span:  2-4 weeks      Long Term Goals:    1. Pt will be independent with written HEP. (Ongoing)  2. Pt will have a sig improvement in RMQ >/= MDC (29.17) (Met)  3. Pt will be able to complete prolonged sitting with min to no modifications x 2 hours to improve work efficiency and ADL's. (Partially met; able to sit for an hour)  4. Pt will have 70% or greater improvement in bed mobility and sleep hygiene. (Partially met; 50-60% improved)  5. Pt will be able to demonstrate appropriate hinge consistently without reproduction of s/s at least 75% of the time or greater. (Ongoing goal)     Long term goal time span:  6-8 weeks    Plan:   Planned therapy interventions:  Neuromuscular Re-education (CPT 97035), Manual Therapy (CPT 97112), Therapeutic Exercise (CPT 88162) and Gait Training (CPT 55368)  Frequency:  1x week  Duration in visits:  6  Plan details:  UPOC: 7/5/24          Referring provider co-signature:  I have reviewed this plan of care and my co-signature certifies the need for services.     Certification Period: 05/06/2024 to 7/5/24    Physician Signature: ________________________________ Date: ______________

## 2024-05-20 NOTE — PROGRESS NOTES
Chief Complaint   Patient presents with    Neck Pain           Ear Pain       HISTORY OF THE PRESENT ILLNESS: Patient is a 48 y.o. male.     Patient comes in complaining of right sided posterior neck and ear pain. He is concerned he may have an infection in his ear. No discharge. No change in hearing. Symptoms are worse with movement.    Allergies: Pcn [penicillins]    Current Outpatient Medications Ordered in Epic   Medication Sig Dispense Refill    NON SPECIFIED C-Verapamil 15% Perme8, apply topically to effected area daily as prescribed.  Disp 30g, 3 refills 30 Each 3    TURMERIC PO Take 1 Cap by mouth.      vitamin D, cholecalciferol, 400 units Tab tablet Take 400 Units by mouth.      Cyanocobalamin (VITAMIN B-12) 1000 MCG Tab Take 1,000 mcg by mouth.      diphenhydrAMINE (BENADRYL) 25 MG capsule Take 25 mg by mouth.      Multiple Vitamin (MULTI-VITAMINS) Tab Take 1 Tab by mouth.      tadalafil (CIALIS) 5 MG tablet Cialis 5 mg tablet   TAKE 1 TABLET BY MOUTH EVERY DAY AS DIRECTED BY MD      mupirocin (BACTROBAN) 2 % Ointment Apply 1 Application to affected area(s) 2 times a day. 1 Tube 0     No current Epic-ordered facility-administered medications on file.       Past medical history, social history and family history were reviewed from chart today    Review of systems: Per HPI.    All others negative.     Exam: /64 (BP Location: Left arm, Patient Position: Sitting, BP Cuff Size: Adult)   Pulse 64   Temp 36.9 °C (98.4 °F) (Temporal)   Resp 12   SpO2 98%   General: Well-appearing. Well-developed. No signs of distress.  HEENT: Grossly normal. Oral cavity is pink and moist. Bilateral ears, canals and tympanic membrane are normal in appearance.  Neck: Right Sternocleidomastoid Muscle is tender. Do not appreciate any adenopathy  Pulmonary: Clear with good breath sounds. Normal effort.  Cardiovascular: Regular. Carotid and radial pulses are intact.  Abdomen: Soft, nontender, nondistended. Spleen and liver  are not enlarged.  Neurologic: Cranial nerves II through XII are grossly normal, alert and oriented x3      Diagnosis:  1. Sternocleidomastoid muscle tenderness          Suspect strain related to exercise.  Anti-inflammatories, he will rest.  Gentle strain. I think it is okay for him to try acupuncture   Physical therapy if persists    My total time spent caring for the patient on the day of the encounter was  greater than 20 minutes.   This includes obtaining history, reviewing chart, physical exam, patient education, reviewing outside records, placing orders, interpreting tests and coordinating care.    Portions of this note were completed using voice recognition software (Dragon Naturally speaking software) . Occasional transcription errors may have escaped proof reading. I have made every reasonable attempt to correct obvious errors, but I expect that there are errors of grammar and possibly content that I did not discover before finalizing the note.

## 2024-05-22 ENCOUNTER — PHYSICAL THERAPY (OUTPATIENT)
Dept: PHYSICAL THERAPY | Facility: MEDICAL CENTER | Age: 49
End: 2024-05-22
Attending: PHYSICAL MEDICINE & REHABILITATION
Payer: COMMERCIAL

## 2024-05-22 DIAGNOSIS — M25.551 CHRONIC HIP PAIN, BILATERAL: ICD-10-CM

## 2024-05-22 DIAGNOSIS — M54.41 CHRONIC RIGHT-SIDED LOW BACK PAIN WITH RIGHT-SIDED SCIATICA: ICD-10-CM

## 2024-05-22 DIAGNOSIS — M25.552 CHRONIC HIP PAIN, BILATERAL: ICD-10-CM

## 2024-05-22 DIAGNOSIS — G89.29 CHRONIC HIP PAIN, BILATERAL: ICD-10-CM

## 2024-05-22 DIAGNOSIS — G89.29 CHRONIC RIGHT-SIDED LOW BACK PAIN WITH RIGHT-SIDED SCIATICA: ICD-10-CM

## 2024-05-22 NOTE — OP THERAPY DAILY TREATMENT
Outpatient Physical Therapy  DAILY TREATMENT     [facility] Outpatient Physical Therapy  [address]  [place] [address]  Phone:  [phone]  Fax:  [phone]    Date: 05/22/2024    Patient: Fish Sandoval  YOB: 1975  MRN: [id number]     Time Calculation    Start time: 1417  Stop time: 1528 Time Calculation (min): 71 minutes         Chief Complaint: Back Problem and Hip Problem    Visit #: 13    SUBJECTIVE:  Collarbone still exists 70% improved; 30% remaining. Not as impacted with changing; more noticeable with chest workouts or stretching the chest. Still feels like his sidelying hip exercise is going well. Has been completing his exercises for back/hip; is able to go about 3-5 days without exercises before feeling symptoms. Back feels it is maintaining moreso than hips. No longer pain with turning in bed. Can stand for about 12-15 min before back pain starts.     Objective:   Oswestry: 14 (Min disability level)  RMQ: 16.67    Lumbar ROM-NT    Therapeutic Exercises (CPT 11234):     7. RMQ and Oswestry completion    9. prone c/s retractions (CTJ emphasis), x10, VC's to angle c/s to 45 deg; hep    10. SL t/s rotations, x10 ea, limited B; improved with incr reps //improved 'collarbone' pain; advised to return to this hep    17. Qped sequence: fire hydrants, donkey kicks, LE extension pulses, x 5 ea; orange TB, VC's for neutral spine and neck position; hep    18. pt education, re: complete 2-3 exercises and alternate ex on hep      Therapeutic Exercise Summary: Pt performed these exercises with instruction and SPV.  Provided handout with these exercises for daily HEP.        Therapeutic Treatments and Modalities:     2. Manual Therapy (CPT 44900), see below    3. Mechanical Traction (CPT 52936), 27/10# c/s traction with mhp x15 min, increased by 2#    4. Mechanical Traction (CPT 86362), 115/50# mech traction with mhp x15 min    Therapeutic Treatment and  Modalities Summary: CPA to CTJ-T10 and rotational mobs Gr III in prone lying. Then, STM to levator scap R, scapular mobs all directions gr III and subscap release x5 in SL (pillow barrier for modesty) //improved shoulder flexion ROM following with decr collar bone pain.     Lastly, L first rib inferior mobs gr 3, SCJ inf and post mobs gr 3    Time-based treatments/modalities:    Physical Therapy Timed Treatment Charges  Manual therapy minutes (CPT 31042): 23 minutes  Therapeutic exercise minutes (CPT 89903): 18 minutes    Pain pre-treatment: Pain with OH ROM R GHJ  Pain post-treatment: 15# remaining pain at R collarbone      ASSESSMENT:   Response to treatment:   Pt reporting s/s well maintained from manual at last session; upon completion today, 15% residual s/s at collar bone; pt reporting experiencing with gym exercises, no longer with donning/doffing clothing. Noted improvements in shoulder mobility with concurrent c/s manual traction suggesting c/s likely involved in limitations at R GHJ.    Further progression of hip and core stability training in quadruped with frequent VC's to neutralize back position.  Will review this sequence of ex and progress to continue working on stability program.    Increased by 2# at c/s for mech traction; will assess response next visit.        PLAN/RECOMMENDATIONS:   Plan for treatment: therapy treatment to continue next visit.  Planned interventions for next visit: continue with current treatment.

## 2024-05-30 ENCOUNTER — APPOINTMENT (OUTPATIENT)
Dept: PHYSICAL THERAPY | Facility: MEDICAL CENTER | Age: 49
End: 2024-05-30
Attending: PHYSICAL MEDICINE & REHABILITATION
Payer: COMMERCIAL

## 2024-06-05 ENCOUNTER — APPOINTMENT (OUTPATIENT)
Dept: PHYSICAL MEDICINE AND REHAB | Facility: MEDICAL CENTER | Age: 49
End: 2024-06-05
Payer: COMMERCIAL

## 2024-06-05 VITALS
SYSTOLIC BLOOD PRESSURE: 102 MMHG | TEMPERATURE: 98 F | WEIGHT: 169 LBS | DIASTOLIC BLOOD PRESSURE: 60 MMHG | HEART RATE: 68 BPM | BODY MASS INDEX: 24.2 KG/M2 | HEIGHT: 70 IN | OXYGEN SATURATION: 94 %

## 2024-06-05 DIAGNOSIS — M47.816 LUMBAR SPONDYLOSIS: ICD-10-CM

## 2024-06-05 DIAGNOSIS — M54.51 VERTEBROGENIC LOW BACK PAIN: ICD-10-CM

## 2024-06-05 DIAGNOSIS — M25.859 HIP IMPINGEMENT SYNDROME, UNSPECIFIED LATERALITY: ICD-10-CM

## 2024-06-05 DIAGNOSIS — G89.29 CHRONIC RIGHT-SIDED LOW BACK PAIN WITH RIGHT-SIDED SCIATICA: ICD-10-CM

## 2024-06-05 DIAGNOSIS — M25.552 CHRONIC HIP PAIN, BILATERAL: ICD-10-CM

## 2024-06-05 DIAGNOSIS — G89.29 CHRONIC SHOULDER PAIN, UNSPECIFIED LATERALITY: ICD-10-CM

## 2024-06-05 DIAGNOSIS — M54.41 CHRONIC RIGHT-SIDED LOW BACK PAIN WITH RIGHT-SIDED SCIATICA: ICD-10-CM

## 2024-06-05 DIAGNOSIS — M51.26 LUMBAR DISC HERNIATION: ICD-10-CM

## 2024-06-05 DIAGNOSIS — G89.29 CHRONIC NECK PAIN: ICD-10-CM

## 2024-06-05 DIAGNOSIS — M25.551 CHRONIC HIP PAIN, BILATERAL: ICD-10-CM

## 2024-06-05 DIAGNOSIS — M25.519 CHRONIC SHOULDER PAIN, UNSPECIFIED LATERALITY: ICD-10-CM

## 2024-06-05 DIAGNOSIS — M54.16 LUMBAR RADICULOPATHY: ICD-10-CM

## 2024-06-05 DIAGNOSIS — G89.29 CHRONIC HIP PAIN, BILATERAL: ICD-10-CM

## 2024-06-05 DIAGNOSIS — M54.2 CHRONIC NECK PAIN: ICD-10-CM

## 2024-06-05 PROCEDURE — 3078F DIAST BP <80 MM HG: CPT | Performed by: PHYSICAL MEDICINE & REHABILITATION

## 2024-06-05 PROCEDURE — 3074F SYST BP LT 130 MM HG: CPT | Performed by: PHYSICAL MEDICINE & REHABILITATION

## 2024-06-05 PROCEDURE — 99214 OFFICE O/P EST MOD 30 MIN: CPT | Performed by: PHYSICAL MEDICINE & REHABILITATION

## 2024-06-05 ASSESSMENT — PATIENT HEALTH QUESTIONNAIRE - PHQ9: CLINICAL INTERPRETATION OF PHQ2 SCORE: 0

## 2024-06-05 ASSESSMENT — FIBROSIS 4 INDEX: FIB4 SCORE: 1.19

## 2024-06-05 ASSESSMENT — PAIN SCALES - GENERAL: PAINLEVEL: 3=SLIGHT PAIN

## 2024-06-05 NOTE — PATIENT INSTRUCTIONS
Dead hangs are hanging from a pull up bar.  First hang for as long as you can with a timer.  Have a goal of hanging for at least 1 minute at a time.  You can do this a few times per day.    Consider getting an inversion table or a linette table.  This may be difficult to go to full inversion at first.  At first try 60 degrees and increase as tolerated.  If you have any signs of vertigo then stop immediately.  You could also do inverted sit ups.

## 2024-06-05 NOTE — PROGRESS NOTES
Follow up patient note  Interventional spine and Pain  Physiatry (physical medicine and  Rehabilitation)       Chief complaint:   Chief Complaint   Patient presents with    Follow-Up     Back pain          HISTORY    Please see new patient note by Dr Whitlock,  for more details.     HPI  Patient identification: Fish Sandoval ,  1975,   With Diagnoses of Lumbar spondylosis, Lumbar radiculopathy, Chronic right-sided low back pain with right-sided sciatica, small Lumbar disc herniation L5-S1 with annular tear, Vertebrogenic low back pain, Chronic hip pain, bilateral, Hip impingement syndrome, Chronic shoulder pain, and Chronic neck pain were pertinent to this visit.     He notes that he has not felt this good in five years. He has been working with chiropractor Dr Walker Man.     Back pain is intermittent 3 out of 10 in intensity aching quality.  Neck pain and arm pain are 3 out of 10 in intensity.  He does have pain in the anterior right hip aching in quality moderate intensity intermittent.  He is able to go to the gym and is doing all of his ADLs and home exercises.       ROS Red Flags :   Fever, Chills, Sweats: Denies  Involuntary Weight Loss: Denies  Bowel/Bladder Incontinence: Denies  Saddle Anesthesia: Denies        PMHx:   History reviewed. No pertinent past medical history.    PSHx:   Past Surgical History:   Procedure Laterality Date    APPENDECTOMY         Family history   Family History   Problem Relation Age of Onset    Cancer Unknown          Medications:   Outpatient Medications Marked as Taking for the 24 encounter (Office Visit) with Tarik Whitlock M.D.   Medication Sig Dispense Refill    NON SPECIFIED C-Verapamil 15% Perme8, apply topically to effected area daily as prescribed.  Disp 30g, 3 refills 30 Each 3    TURMERIC PO Take 1 Cap by mouth.      vitamin D, cholecalciferol, 400 units Tab tablet Take 400 Units by mouth.      Cyanocobalamin (VITAMIN B-12) 1000 MCG Tab Take 1,000 mcg by  mouth.      Multiple Vitamin (MULTI-VITAMINS) Tab Take 1 Tab by mouth.      tadalafil (CIALIS) 5 MG tablet Cialis 5 mg tablet   TAKE 1 TABLET BY MOUTH EVERY DAY AS DIRECTED BY MD      mupirocin (BACTROBAN) 2 % Ointment Apply 1 Application to affected area(s) 2 times a day. 1 Tube 0        Current Outpatient Medications on File Prior to Visit   Medication Sig Dispense Refill    NON SPECIFIED C-Verapamil 15% Perme8, apply topically to effected area daily as prescribed.  Disp 30g, 3 refills 30 Each 3    TURMERIC PO Take 1 Cap by mouth.      vitamin D, cholecalciferol, 400 units Tab tablet Take 400 Units by mouth.      Cyanocobalamin (VITAMIN B-12) 1000 MCG Tab Take 1,000 mcg by mouth.      Multiple Vitamin (MULTI-VITAMINS) Tab Take 1 Tab by mouth.      tadalafil (CIALIS) 5 MG tablet Cialis 5 mg tablet   TAKE 1 TABLET BY MOUTH EVERY DAY AS DIRECTED BY MD      mupirocin (BACTROBAN) 2 % Ointment Apply 1 Application to affected area(s) 2 times a day. 1 Tube 0    diphenhydrAMINE (BENADRYL) 25 MG capsule Take 25 mg by mouth. (Patient not taking: Reported on 6/5/2024)       No current facility-administered medications on file prior to visit.         Allergies:   Allergies   Allergen Reactions    Pcn [Penicillins]        Social Hx:   Social History     Socioeconomic History    Marital status: Single     Spouse name: Not on file    Number of children: Not on file    Years of education: Not on file    Highest education level: Professional school degree (e.g., MD, DDS, DVM, BURAK)   Occupational History    Not on file   Tobacco Use    Smoking status: Never    Smokeless tobacco: Never   Vaping Use    Vaping status: Never Used   Substance and Sexual Activity    Alcohol use: No    Drug use: Never    Sexual activity: Not on file   Other Topics Concern    Not on file   Social History Narrative    Not on file     Social Determinants of Health     Financial Resource Strain: Low Risk  (8/26/2023)    Overall Financial Resource Strain  "(CARDIA)     Difficulty of Paying Living Expenses: Not very hard   Food Insecurity: No Food Insecurity (8/26/2023)    Hunger Vital Sign     Worried About Running Out of Food in the Last Year: Never true     Ran Out of Food in the Last Year: Never true   Transportation Needs: No Transportation Needs (8/26/2023)    PRAPARE - Transportation     Lack of Transportation (Medical): No     Lack of Transportation (Non-Medical): No   Physical Activity: Sufficiently Active (8/26/2023)    Exercise Vital Sign     Days of Exercise per Week: 6 days     Minutes of Exercise per Session: 90 min   Stress: Stress Concern Present (8/26/2023)    [employer] - Occupational Stress Questionnaire     Feeling of Stress : To some extent   Social Connections: Socially Isolated (8/26/2023)    Social Connection and Isolation Panel [NHANES]     Frequency of Communication with Friends and Family: Once a week     Frequency of Social Gatherings with Friends and Family: Once a week     Attends [employer] Services: Never     Active Member of Clubs or Organizations: No     Attends Club or Organization Meetings: Never     Marital Status: Never    Intimate Partner Violence: Not on file   Housing Stability: Low Risk  (8/26/2023)    Housing Stability Vital Sign     Unable to Pay for Housing in the Last Year: No     Number of Places Lived in the Last Year: 1     Unstable Housing in the Last Year: No         EXAMINATION     Physical Exam:   Vitals: /60 (BP Location: Right arm, Patient Position: Sitting, BP Cuff Size: Adult)   Pulse 68   Temp 36.7 °C (98 °F) (Temporal)   Ht 1.778 m (5' 10\")   Wt 76.7 kg (169 lb)   SpO2 94%     Constitutional:   Body Habitus: Body mass index is 24.25 kg/m².  Cooperation: Fully cooperates with exam  Appearance: Well-groomed no disheveled    Respiratory-  breathing comfortable on room air, no audible wheezing  Cardiovascular- capillary refills less than 2 seconds. No lower extremity " "edema is noted.   Psychiatric- alert and oriented ×3. Normal affect.    MSK and Neuro: -  Strength is 5 out of 5 in the bilateral upper and  lower extremities      MEDICAL DECISION MAKING    DATA    Labs:   Lab Results   Component Value Date/Time    SODIUM 135 08/31/2023 10:20 AM    POTASSIUM 4.2 08/31/2023 10:20 AM    CHLORIDE 103 08/31/2023 10:20 AM    CO2 23 08/31/2023 10:20 AM    GLUCOSE 98 08/31/2023 10:20 AM    BUN 20 08/31/2023 10:20 AM    CREATININE 0.99 08/31/2023 10:20 AM    CREATININE 0.91 08/12/2011 12:00 AM    BUNCREATRAT 19 08/12/2011 12:00 AM    GLOMRATE >59 07/23/2010 08:36 AM        No results found for: \"PROTHROMBTM\", \"INR\"     Lab Results   Component Value Date/Time    WBC 4.2 (L) 08/31/2023 10:20 AM    WBC 5.6 08/12/2011 12:00 AM    RBC 4.70 08/31/2023 10:20 AM    RBC 4.86 08/12/2011 12:00 AM    HEMOGLOBIN 15.2 08/31/2023 10:20 AM    HEMATOCRIT 44.0 08/31/2023 10:20 AM    MCV 93.6 08/31/2023 10:20 AM    MCV 97 08/12/2011 12:00 AM    MCH 32.3 08/31/2023 10:20 AM    MCH 32.3 08/12/2011 12:00 AM    MCHC 34.5 08/31/2023 10:20 AM    MPV 11.3 08/31/2023 10:20 AM    NEUTSPOLYS 52.80 08/31/2023 10:20 AM    LYMPHOCYTES 34.70 08/31/2023 10:20 AM    MONOCYTES 8.40 08/31/2023 10:20 AM    EOSINOPHILS 2.90 08/31/2023 10:20 AM    BASOPHILS 0.70 08/31/2023 10:20 AM        Lab Results   Component Value Date/Time    HBA1C 5.3 03/31/2023 11:15 AM          Imaging:   I personally reviewed following images    MRI lumbar spine 11/13/2023  Degenerative disc disease worst at L5-S1.  High intensity zone consistent with annular tear at L5-S1.  Facet arthropathy worst bilaterally at L5-S1 and also present at L4-5.  Mild degenerative disc disease at L4-5.    X-ray hips 11/6/2023  Morphology of the hips that can be seen with hip impingement syndrome.      I reviewed the following radiology reports                         Results for orders placed during the hospital encounter of 11/13/23    MR-LUMBAR " SPINE-W/O    Impression  1.  Mild degenerative disease in the lumbar spine as described above. There is no significant spinal or neural foraminal stenosis.  2.  There is an approximately 1.2 cm sized T1 isointense and T2 hypointense lesion in the L5 vertebral body likely representing an atypical hemangioma.        Results for orders placed during the hospital encounter of 23    MR-LUMBAR SPINE-W/O    Impression  1.  Mild degenerative disease in the lumbar spine as described above. There is no significant spinal or neural foraminal stenosis.  2.  There is an approximately 1.2 cm sized T1 isointense and T2 hypointense lesion in the L5 vertebral body likely representing an atypical hemangioma.                                                                                                                Results for orders placed during the hospital encounter of 23    DX-LUMBAR SPINE-2 OR 3 VIEWS    Impression  No significant spondylosis. No acute fracture or listhesis.                         DIAGNOSIS   Visit Diagnoses     ICD-10-CM   1. Lumbar spondylosis  M47.816   2. Lumbar radiculopathy  M54.16   3. Chronic right-sided low back pain with right-sided sciatica  M54.41    G89.29   4. small Lumbar disc herniation L5-S1 with annular tear  M51.26   5. Vertebrogenic low back pain  M54.51   6. Chronic hip pain, bilateral  M25.551    M25.552    G89.29   7. Hip impingement syndrome  M25.859   8. Chronic shoulder pain  M25.519    G89.29   9. Chronic neck pain  M54.2    G89.29         ASSESSMENT and PLAN:     Fish Hebert Lori  1975 male      Fish was seen today for follow-up.    Diagnoses and all orders for this visit:    Lumbar spondylosis  -     Referral to Physical Therapy    Lumbar radiculopathy  -     Referral to Physical Therapy    Chronic right-sided low back pain with right-sided sciatica  -     Referral to Physical Therapy    small Lumbar disc herniation L5-S1 with annular tear  -     Referral  to Physical Therapy    Vertebrogenic low back pain  -     Referral to Physical Therapy    Chronic hip pain, bilateral  -     Referral to Physical Therapy    Hip impingement syndrome  -     Referral to Physical Therapy    Chronic shoulder pain  -     Referral to Physical Therapy    Chronic neck pain  -     Referral to Physical Therapy  -     DX-CERVICAL SPINE-2 OR 3 VIEWS; Future      We discussed additions to the home exercise program.    Continue chiropractic care.  Continue home exercise program and physical therapy.    He is having axial neck pain.  There are no images on file.  We will obtain baseline x-rays.    Medications: Acetaminophen up to 1000 mg 3 times daily as needed not to exceed 3000 mg per 24-hours.    Follow up: 6 months or sooner as needed    Thank you for allowing me to participate in the care of this patient. If you have any questions please not hesitate to contact me.             Please note that this dictation was created using voice recognition software. I have made every reasonable attempt to correct obvious errors but there may be errors of grammar and content that I may have overlooked prior to finalization of this note.      Tarik Whitlock MD  Interventional Spine and Sports Physiatry  Physical Medicine and Rehabilitation  [facility]

## 2024-06-06 ENCOUNTER — HOSPITAL ENCOUNTER (OUTPATIENT)
Dept: RADIOLOGY | Facility: MEDICAL CENTER | Age: 49
End: 2024-06-06
Attending: PHYSICAL MEDICINE & REHABILITATION
Payer: COMMERCIAL

## 2024-06-06 DIAGNOSIS — M54.2 CHRONIC NECK PAIN: ICD-10-CM

## 2024-06-06 DIAGNOSIS — G89.29 CHRONIC NECK PAIN: ICD-10-CM

## 2024-06-06 PROCEDURE — 72040 X-RAY EXAM NECK SPINE 2-3 VW: CPT

## 2024-06-10 ENCOUNTER — PHYSICAL THERAPY (OUTPATIENT)
Dept: PHYSICAL THERAPY | Facility: MEDICAL CENTER | Age: 49
End: 2024-06-10
Attending: PHYSICAL MEDICINE & REHABILITATION
Payer: COMMERCIAL

## 2024-06-10 DIAGNOSIS — M25.551 CHRONIC HIP PAIN, BILATERAL: ICD-10-CM

## 2024-06-10 DIAGNOSIS — G89.29 CHRONIC HIP PAIN, BILATERAL: ICD-10-CM

## 2024-06-10 DIAGNOSIS — M25.552 CHRONIC HIP PAIN, BILATERAL: ICD-10-CM

## 2024-06-10 PROCEDURE — 97110 THERAPEUTIC EXERCISES: CPT

## 2024-06-10 PROCEDURE — 97012 MECHANICAL TRACTION THERAPY: CPT

## 2024-06-10 NOTE — OP THERAPY DAILY TREATMENT
Outpatient Physical Therapy  DAILY TREATMENT     [facility] Outpatient Physical Therapy  [address]  [place] [address]  Phone:  [phone]  Fax:  [phone]    Date: 06/10/2024    Patient: Fish Sandoval  YOB: 1975  MRN: [id number]     Time Calculation    Start time: 1502  Stop time: 1615 Time Calculation (min): 73 minutes         Chief Complaint: Back Problem    Visit #: 13    SUBJECTIVE:  Pt reporting he recently had neck pain that caused him to have difficulties with turning his head; may have slept wrong. Overall, is having some hip and back pain as well-has had some difficulties with full compliancy to all the hep.    Therapeutic Exercises (CPT 17442):     11. REIL, x10    12. REIL, x10, 50% improved hip and LBP    13. hip IR with ball seated, green TB, x15 with 1-2 sec hold, reviewed    14. review of hep, encouraged pt to focus on REIL with OP and hip/prox pelvic girdle strengthening throughout the week until next visit    17. Qped sequence: fire hydrants, donkey kicks, LE extension pulses, x 5 ea; orange TB, VC's for neutral spine and neck position; hep    18. pt education, re: complete 2-3 exercises and alternate ex on hep      Therapeutic Exercise Summary: Pt performed these exercises with instruction and SPV.  Provided handout with these exercises for daily HEP.        Therapeutic Treatments and Modalities:     2. Manual Therapy (CPT 97090), see below    3. Mechanical Traction (CPT 77394), 27/10# c/s traction with mhp x15 min, increased by 2#    4. Mechanical Traction (CPT 37222), 115/50# mech traction with mhp x15 min    Therapeutic Treatment and Modalities Summary: CPA to CTJ-T10 and rotational mobs Gr III in prone lying. Then, STM to levator scap R, scapular mobs all directions gr III and subscap release x5 in SL (pillow barrier for modesty) //improved shoulder flexion ROM following with decr collar bone pain.     Lastly, L first rib  inferior mobs gr 3, SCJ inf and post mobs gr 3    Time-based treatments/modalities:    Physical Therapy Timed Treatment Charges  Therapeutic exercise minutes (CPT 06131): 43 minutes      ASSESSMENT:   Response to treatment:     See PN for additional details        PLAN/RECOMMENDATIONS:   Plan for treatment: therapy treatment to continue next visit.  Planned interventions for next visit: continue with current treatment.

## 2024-06-11 NOTE — OP THERAPY PROGRESS SUMMARY
Outpatient Physical Therapy  PROGRESS SUMMARY NOTE      [facility] Outpatient Physical Therapy  [address]  [place] [address]  Phone:  [phone]  Fax:  [phone]    Date of Visit: 06/10/2024    Patient: Fish Sandoval  YOB: 1975  MRN: [id number]     Referring Provider: Tarik Whitlock M.D.  [address]  [address]  [place] [address]   Referring Diagnosis Pain in right hip [M25.551];Pain in left hip [M25.552];Other chronic pain [G89.29];Lumbago with sciatica, right side [M54.41];Radiculopathy, lumbar region [M54.16]     Visit Diagnoses     ICD-10-CM   1. Chronic hip pain, bilateral  M25.551    M25.552    G89.29       Rehab Potential: fair/good    Progress Report Period: 5/6/24-6/10/24    Functional Assessment Used  Oswestry Low Back Pain Disability Total Score: 20  Grey Ilir Low Back Pain and Disability Score: 16.67       Objective Findings and Assessment:   Patient progression towards goals: Pt has been seen for 13 visits. Pt noting improvements with prolonged sitting with decreased pain intensity, but continuing to experience discomfort after about 30-35 min of sitting. Pt able to complete his gym routine for upper body and no longer experiencing triceps pain. Pt however noting he has been experiencing R collarbone pain after completing cervical retractions-improved. Pt reporting he feels relief with his hip strengthening routine and notices the difference-has had some difficulty with compliance to well rounded program; Pt reporting sig decrease in s/s at both hip and l/s following REIL; encouraged pt to return to extension program consistently to assess effects. PT thus far has consisted of manual treatment, traction, neural glides, repeated movements, stretching and strengthening. Multiple body regions have been addressed in sessions due to hindrance to POC. Will continue to emphasize core, back, and hip impairments for a few  additional visits in approved follow ups.    Objective findings and assessment details: Lumbar ROM:  L/s ROM:  Flexion: FT to toes; stiffness in l/s (decreased pain in hip and back)   Extension: min restricted; improved back s/s (hips radiating to back, slight increase; low back slightly increases)   SB: restricted R>L; discomfort in R l/s and hip   Rotation: restricted to L>R; pain to R  Side glides: hips to R-abolishes s/s; hips to L -increases       Goals:   Short Term Goals:   1. Pt will be independent with written HEP. (Met)  2. Pt will be indep with modifying his environment for comfort (Met)  3. Therapist to further evaluate c/s (Met)    Short term goal time span:  2-4 weeks      Long Term Goals:    1. Pt will be independent with written HEP. (Ongoing)  2. Pt will have a sig improvement in RMQ >/= MDC (29.17) (Met)  3. Pt will be able to complete prolonged sitting with min to no modifications x 2 hours to improve work efficiency and ADL's. (Partially met; able to sit for an hour)  4. Pt will have 70% or greater improvement in bed mobility and sleep hygiene. (Partially met; 60-70% improved l/s)  5. Pt will be able to demonstrate appropriate hinge consistently without reproduction of s/s at least 75% of the time or greater. (Partially met)     Long term goal time span:  6-8 weeks    Plan:   Planned therapy interventions:  Neuromuscular Re-education (CPT 03757), Manual Therapy (CPT 81672), Mechanical Traction (CPT 39069), E Stim Unattended (CPT 63507), Gait Training (CPT 13446), Therapeutic Exercise (CPT 67614) and Therapeutic Activities (CPT 38488)  Frequency:  1x week  Duration in visits:  3  Plan details:  UPOC: 7/26/24      Referring provider co-signature:  I have reviewed this plan of care and my co-signature certifies the need for services.     Certification Period: 06/10/2024 to 7/26/24    Physician Signature: ________________________________ Date: ______________

## 2024-06-24 ENCOUNTER — PHYSICAL THERAPY (OUTPATIENT)
Dept: PHYSICAL THERAPY | Facility: MEDICAL CENTER | Age: 49
End: 2024-06-24
Attending: PHYSICAL MEDICINE & REHABILITATION
Payer: COMMERCIAL

## 2024-06-24 DIAGNOSIS — M54.41 CHRONIC RIGHT-SIDED LOW BACK PAIN WITH RIGHT-SIDED SCIATICA: ICD-10-CM

## 2024-06-24 DIAGNOSIS — M25.551 CHRONIC HIP PAIN, BILATERAL: ICD-10-CM

## 2024-06-24 DIAGNOSIS — G89.29 CHRONIC RIGHT-SIDED LOW BACK PAIN WITH RIGHT-SIDED SCIATICA: ICD-10-CM

## 2024-06-24 DIAGNOSIS — G89.29 CHRONIC HIP PAIN, BILATERAL: ICD-10-CM

## 2024-06-24 DIAGNOSIS — M25.552 CHRONIC HIP PAIN, BILATERAL: ICD-10-CM

## 2024-06-24 PROCEDURE — 97110 THERAPEUTIC EXERCISES: CPT

## 2024-06-24 NOTE — OP THERAPY DAILY TREATMENT
Outpatient Physical Therapy  DAILY TREATMENT     [facility] Outpatient Physical Therapy  [address]  [place] [address]  Phone:  [phone]  Fax:  [phone]    Date: 06/24/2024    Patient: Fish Sandoval  YOB: 1975  MRN: [id number]     Time Calculation      1600  1710  70 min          Chief Complaint: Back Problem and Hip Problem    Visit #: 13    SUBJECTIVE:  Pt went to go get Keo patterning; a type of massage. Has been completing this for years-helpful in managing back and hip pain. Stating he is being told by multiple providers to start lower body weight lifting. Pt reporting hip has been feeling pretty good; cindy when compliance with PT exercises. Notices some discomfort in lateral hip and stiffness, usually in the am. Has noticed some lat soreness that is consistent with activity; pain-free at rest on R.       Therapeutic Exercises (CPT 74457):     3. pt education, re: warm up prior to exercise, cool down cindy active cooldown if heavy eccentric focus during routine, prone press ups w/ exhale->regress to PPU only or SHAGUFTA repeated, carb and protein intake before and after workout,    4. review of hep    11. REIL, x10, hypomobile l/s    12. SHAGUFTA, x10, hypomobile l/s      Therapeutic Exercise Summary: Pt performed these exercises with instruction and SPV.  Provided handout with these exercises for daily HEP.        Therapeutic Treatments and Modalities:     3. Mechanical Traction (CPT 05425), 27/10# c/s traction with mhp x10 min    4. Mechanical Traction (CPT 25555), 115/50# mech traction with mhp x10 min    Therapeutic Treatment and Modalities Summary:       Time-based treatments/modalities:    Physical Therapy Timed Treatment Charges  Therapeutic exercise minutes (CPT 52442): 50 minutes      ASSESSMENT:   Response to treatment:   Pt with multiple body complaints; attempting to focus on hip and back at this time and may require formal referral for  other regions in future-suspect these areas have not been primarily addressed by PCP. Majority of session focused on pt education (see details above). Will reassess and complete progress update next visit to update and modify hep.    PLAN/RECOMMENDATIONS:   Plan for treatment: therapy treatment to continue next visit.  Planned interventions for next visit: continue with current treatment.

## 2024-07-10 ENCOUNTER — PHYSICAL THERAPY (OUTPATIENT)
Dept: PHYSICAL THERAPY | Facility: MEDICAL CENTER | Age: 49
End: 2024-07-10
Attending: PHYSICAL MEDICINE & REHABILITATION
Payer: COMMERCIAL

## 2024-07-10 DIAGNOSIS — G89.29 CHRONIC RIGHT-SIDED LOW BACK PAIN WITH RIGHT-SIDED SCIATICA: ICD-10-CM

## 2024-07-10 DIAGNOSIS — M54.41 CHRONIC RIGHT-SIDED LOW BACK PAIN WITH RIGHT-SIDED SCIATICA: ICD-10-CM

## 2024-07-10 DIAGNOSIS — M25.551 CHRONIC HIP PAIN, BILATERAL: ICD-10-CM

## 2024-07-10 DIAGNOSIS — G89.29 CHRONIC HIP PAIN, BILATERAL: ICD-10-CM

## 2024-07-10 DIAGNOSIS — M25.552 CHRONIC HIP PAIN, BILATERAL: ICD-10-CM

## 2024-07-10 PROCEDURE — 97110 THERAPEUTIC EXERCISES: CPT

## 2024-07-10 PROCEDURE — 97012 MECHANICAL TRACTION THERAPY: CPT

## 2024-07-10 PROCEDURE — 97140 MANUAL THERAPY 1/> REGIONS: CPT

## 2024-07-15 ENCOUNTER — PHYSICAL THERAPY (OUTPATIENT)
Dept: PHYSICAL THERAPY | Facility: MEDICAL CENTER | Age: 49
End: 2024-07-15
Attending: PHYSICAL MEDICINE & REHABILITATION
Payer: COMMERCIAL

## 2024-07-15 DIAGNOSIS — M54.41 CHRONIC RIGHT-SIDED LOW BACK PAIN WITH RIGHT-SIDED SCIATICA: ICD-10-CM

## 2024-07-15 DIAGNOSIS — G89.29 CHRONIC RIGHT-SIDED LOW BACK PAIN WITH RIGHT-SIDED SCIATICA: ICD-10-CM

## 2024-07-15 PROCEDURE — 97530 THERAPEUTIC ACTIVITIES: CPT

## 2024-07-15 PROCEDURE — 97140 MANUAL THERAPY 1/> REGIONS: CPT

## 2024-07-23 ENCOUNTER — APPOINTMENT (OUTPATIENT)
Dept: PHYSICAL THERAPY | Facility: MEDICAL CENTER | Age: 49
End: 2024-07-23
Attending: PHYSICAL MEDICINE & REHABILITATION
Payer: COMMERCIAL

## 2024-07-23 NOTE — OP THERAPY DAILY TREATMENT
"  Outpatient Physical Therapy  DAILY TREATMENT     [facility] Outpatient Physical Therapy  [address]  [place] [address]  Phone:  [phone]  Fax:  [phone]    Date: 07/23/2024    Patient: Fish Sandoval  YOB: 1975  MRN: [id number]     Time Calculation                  Chief Complaint: No chief complaint on file.    Visit #: 13    SUBJECTIVE:  Pt reporting noticing a low back strain after completing pelvic tilting. Notices strain will last several days. Raised chair and notices decreased s/s when he gets up to move; no longer noticing pinching in l/s -about 35-50%. Sitting tolerance without major changes. Notices stiffness in the neck with WITYs consistently; is wondering if there is a modification or alternative.       Therapeutic Exercises (CPT 99590):     3. pt education, re: modify tilt and complete to lesser degree to decrease pain    4. indep QL self muscle release using lacrosse ball, x4 min at wall, decreased overall back pain; hep    5. diaphragmatic breathing, x2 min supine, improved belly breathing with incr reps; hep    6. 1/s kneeling hip flexor stretch on foam pad, x30 sec ea, VC's to modify PPT to tolerable level    8. ATY's in standing    9. walk away angels      Therapeutic Exercise Summary: Pt performed these exercises with instruction and SPV.  Provided handout with these exercises for daily HEP.        Therapeutic Treatments and Modalities:     3. Mechanical Traction (CPT 74362), 27/10# c/s traction with mhp x10 min    4. Mechanical Traction (CPT 08812), 115/50# mech traction with mhp x10 min    5. Manual Therapy (CPT 55972), see below, x15 min    Therapeutic Treatment and Modalities Summary: STM to psoas B //incr soreness on R  STM to QL B //incr soreness on R   //improved l/s flexion to toes and extension    Time-based treatments/modalities:         Pain in standing: 3.5/10 LB \"strain\"  Decreased about 1.5/10 LBP    ASSESSMENT: "   Response to treatment:       Increased TTP at R psoas and QL region with impaired breathing mechanics. Improved ability to perform l/s flexion in addition to decreased pain complaints following manual and review of hip flexor stretching; discussion to decrease PPT to tolerable level. Updated hep with guidance for indep diaphragmatic breathing and QL release due to sig improvement in session. Plan to modify WITY's in next visit; unable to complete today due to time constraints.       PLAN/RECOMMENDATIONS:   Plan for treatment: therapy treatment to continue next visit.  Planned interventions for next visit: continue with current treatment.  Modify WITY hep

## 2024-07-30 ENCOUNTER — APPOINTMENT (OUTPATIENT)
Dept: PHYSICAL THERAPY | Facility: MEDICAL CENTER | Age: 49
End: 2024-07-30
Attending: PHYSICAL MEDICINE & REHABILITATION
Payer: COMMERCIAL

## 2024-07-31 ENCOUNTER — PHYSICAL THERAPY (OUTPATIENT)
Dept: PHYSICAL THERAPY | Facility: MEDICAL CENTER | Age: 49
End: 2024-07-31
Attending: PHYSICAL MEDICINE & REHABILITATION
Payer: COMMERCIAL

## 2024-07-31 DIAGNOSIS — M25.552 CHRONIC HIP PAIN, BILATERAL: ICD-10-CM

## 2024-07-31 DIAGNOSIS — G89.29 CHRONIC RIGHT-SIDED LOW BACK PAIN WITH RIGHT-SIDED SCIATICA: ICD-10-CM

## 2024-07-31 DIAGNOSIS — M25.551 CHRONIC HIP PAIN, BILATERAL: ICD-10-CM

## 2024-07-31 DIAGNOSIS — M54.41 CHRONIC RIGHT-SIDED LOW BACK PAIN WITH RIGHT-SIDED SCIATICA: ICD-10-CM

## 2024-07-31 DIAGNOSIS — G89.29 CHRONIC HIP PAIN, BILATERAL: ICD-10-CM

## 2024-07-31 PROCEDURE — 97012 MECHANICAL TRACTION THERAPY: CPT

## 2024-07-31 PROCEDURE — 97110 THERAPEUTIC EXERCISES: CPT

## 2024-07-31 PROCEDURE — 97140 MANUAL THERAPY 1/> REGIONS: CPT

## 2024-08-07 ENCOUNTER — PHYSICAL THERAPY (OUTPATIENT)
Dept: PHYSICAL THERAPY | Facility: MEDICAL CENTER | Age: 49
End: 2024-08-07
Attending: PHYSICAL MEDICINE & REHABILITATION
Payer: COMMERCIAL

## 2024-08-07 DIAGNOSIS — G89.29 CHRONIC HIP PAIN, BILATERAL: ICD-10-CM

## 2024-08-07 DIAGNOSIS — G89.29 CHRONIC RIGHT-SIDED LOW BACK PAIN WITH RIGHT-SIDED SCIATICA: ICD-10-CM

## 2024-08-07 DIAGNOSIS — M25.552 CHRONIC HIP PAIN, BILATERAL: ICD-10-CM

## 2024-08-07 DIAGNOSIS — M25.551 CHRONIC HIP PAIN, BILATERAL: ICD-10-CM

## 2024-08-07 DIAGNOSIS — M54.41 CHRONIC RIGHT-SIDED LOW BACK PAIN WITH RIGHT-SIDED SCIATICA: ICD-10-CM

## 2024-08-07 PROCEDURE — 97012 MECHANICAL TRACTION THERAPY: CPT

## 2024-08-07 PROCEDURE — 97110 THERAPEUTIC EXERCISES: CPT

## 2024-08-07 NOTE — OP THERAPY DAILY TREATMENT
Outpatient Physical Therapy  DAILY TREATMENT     [facility] Outpatient Physical Therapy  [address]  [place] [address]  Phone:  [phone]  Fax:  [phone]    Date: 08/07/2024    Patient: Fish Sandoval  YOB: 1975  MRN: [id number]     Time Calculation    Start time: 1546 Stop time: 1711 Time Calculation (min): 85 minutes         Chief Complaint: Back Problem and Hip Problem    Visit #: 13    SUBJECTIVE:  Pt stating he didn't have the strain from Wednesday to Sunday. Was sitting in a bad chair on Sunday but had a lot of discomfort. On Monday continued to have pain and completed the QL self release. Felt about 75% improvement. About 30-50% better overall. Believes the crab walk was bothering his hips; was able to complete ball exercise.       Therapeutic Exercises (CPT 65588):     3. pt education, re: modify tilt and complete to lesser degree to decrease pain    4. indep QL self muscle release using lacrosse ball, x4 min at wall, reviewed verbally    5. diaphragmatic breathing, x2 min supine, reviewed verbally    6. 1/s kneeling hip flexor stretch on foam pad, x30 sec ea, reviewed verbally; discussed using stool lunges to increase stretch if needed    8. ATY's in standing, pink TB, 2x 3-5 sec holds, verbal review    10. pt education, re: management of exercises freq and duration/alternating    12. hip flexor eccentric exercise, 35# dumbbell foot anchor with active hinging and 90/90 at contralat hip; x10 ea then x5 again on R, increased difficulty on R; visible muscle juddering at abdominals    13. lateral lunges, x15 ea, hep    14. active hamstring stretch, x15 ea, hep    16. pt education, re: discussed introducing electrolytes due to fasting regularly for 16 hours; discussed follow up with dietician for additional recommendations on food and electrolytes      Therapeutic Exercise Summary: Pt performed these exercises with instruction and SPV.  Provided  "handout with these exercises for daily HEP.        Therapeutic Treatments and Modalities:     3. Mechanical Traction (CPT 50129), 27/10# c/s traction with mhp x10 min    4. Mechanical Traction (CPT 05240), 115/50# mech traction with mhp x10 min    5. Manual Therapy (CPT 77627), see below, x6 min    Therapeutic Treatment and Modalities Summary:   Educated pt re: side effects from cupping including bruising, erythema, swelling, tenderness that may last several days. Educated pt re: purpose of treatment. Pt verbalized consent to treat.   Pt draped appropriately with 2 cups applied to B QL and 2 smaller cups placed to paraspinals x5 min         Time-based treatments/modalities:    Physical Therapy Timed Treatment Charges  Manual therapy minutes (CPT 87833): 6 minutes  Therapeutic exercise minutes (CPT 10195): 59 minutes    Pain in standing: 3/10 \"strain\" in back; central and L-sided back pain  Decreased about 0/10 with hinging after manual    ASSESSMENT:   Response to treatment:   Pt reporting cont'd positive response to targeting of B QL and paraspinals. Will continue to emphasize these areas in session and work on prox pelvic girdle strength and l/s mobs to avoid overuse at QL regions. Attempted to progress clamshells but limitations due to shoulder discomfort.     Additional discussion of salt being important for normal body processes. Has a hx of frequent and excessive urination, brain fog, intermittent headaches, and visits to urology clinic. Upon further questioning, pt reporting is fasting for about 16 hours at a time and is avoiding salt. Due to known electrolyte loss via sweat and urination during fasting>8 hours, advised pt to trial an electrolyte drink to see if this improves s/s; advised to follow up with dietician for more specific and further recommendations if needed; may collaborate with staff dietician if available.      PLAN/RECOMMENDATIONS:   Plan for treatment: therapy treatment to continue next " visit.  Planned interventions for next visit: continue with current treatment.  Repeat manual if needed

## 2024-08-14 ENCOUNTER — PHYSICAL THERAPY (OUTPATIENT)
Dept: PHYSICAL THERAPY | Facility: MEDICAL CENTER | Age: 49
End: 2024-08-14
Attending: PHYSICAL MEDICINE & REHABILITATION
Payer: COMMERCIAL

## 2024-08-14 DIAGNOSIS — Z01.89 PHYSICAL THERAPY EVALUATION, INITIAL: ICD-10-CM

## 2024-08-14 DIAGNOSIS — G89.29 CHRONIC RIGHT-SIDED LOW BACK PAIN WITH RIGHT-SIDED SCIATICA: ICD-10-CM

## 2024-08-14 DIAGNOSIS — M62.89 PELVIC FLOOR DYSFUNCTION: ICD-10-CM

## 2024-08-14 DIAGNOSIS — M54.41 CHRONIC RIGHT-SIDED LOW BACK PAIN WITH RIGHT-SIDED SCIATICA: ICD-10-CM

## 2024-08-14 PROCEDURE — 97110 THERAPEUTIC EXERCISES: CPT

## 2024-08-14 PROCEDURE — 97012 MECHANICAL TRACTION THERAPY: CPT

## 2024-08-14 NOTE — OP THERAPY DAILY TREATMENT
"  Outpatient Physical Therapy  DAILY TREATMENT     [facility] Outpatient Physical Therapy  [address]  [place] [address]  Phone:  [phone]  Fax:  [phone]    Date: 08/14/2024    Patient: Fish Sandoval  YOB: 1975  MRN: [id number]     Time Calculation    Start time: 1545 Stop time: 1653 Time Calculation (min): 68 minutes         Chief Complaint: Back Problem and Hip Problem    Visit #: 13    SUBJECTIVE:  Pt started taking the electrolytes on Saturday. Noticed he is not experiencing dizziness (daily condition) since this, if this did occur, not memorable. Has headaches rarely but not noticed headaches. Would need to void 1x/hour; has noticed a slight improvement in frequency about 2 less. Has noticed \"go to\" QL stretch will assist with improving s/s. This may last the rest of the day but if active, will last only a few hours. Did have relief from cupping to QL last session, had about 5 days of relief initially but then 3 days next visit.       Therapeutic Exercises (CPT 19449):     3. pt education, re: modify tilt and complete to lesser degree to decrease pain    4. indep QL self muscle release using lacrosse ball, x4 min at wall, verbal review    5. diaphragmatic breathing, x2 min supine, NT    6. 1/s kneeling hip flexor stretch on foam pad, x30 sec ea, NT    8. ATY's in standing, pink TB, 2x 3-5 sec holds, NT    12. hip flexor eccentric exercise, 35# dumbbell foot anchor with active hinging and 90/90 at contralat hip; x10 ea then x5 again on R, NT    13. lateral lunges, x15 ea, NT    14. active hamstring stretch, x15 ea, NT    16. pt education, re: discussed introducing electrolytes due to fasting regularly for 16 hours; discussed follow up with dietician for additional recommendations on food and electrolytes, reviewed with pt re: convo with dietician    17. isometric glute med at wall, 30 sec-hip flexed, hip neutral, hip extended, hep    18. SL sit " to stands, 2x10 ea, muscle juddering and instability; hep      Therapeutic Exercise Summary: Pt performed these exercises with instruction and SPV.  Provided handout with these exercises for daily HEP.        Therapeutic Treatments and Modalities:     3. Mechanical Traction (CPT 98775), 27/10# c/s traction with mhp x10 min    4. Mechanical Traction (CPT 34774), 115/50# mech traction with mhp x10 min    Time-based treatments/modalities:    Physical Therapy Timed Treatment Charges  Therapeutic exercise minutes (CPT 61535): 48 minutes    Pain pre treatment: 2.5-3/10 R hip, 3-5/10 low back central         ASSESSMENT:   Response to treatment:   Pt noticing he may have had a slight improvements in headaches and dizziness since using one packet of electrolytes. Will seek out dietetics services and follow up with pelvic floor services at [facility] to address concerns. Reviewed discussion of salt being important for normal body processes and to follow up with dietician re: additional recommendations. Pt demonstrating incr weakness, muscle juddering, and instability R>LLE during challenges in session. Will continue to challenge in functional positions, SL advancement, and pelvic stab focused rehab in follow ups.    PLAN/RECOMMENDATIONS:   Plan for treatment: therapy treatment to continue next visit.  Planned interventions for next visit: continue with current treatment.

## 2024-08-21 ENCOUNTER — NON-PROVIDER VISIT (OUTPATIENT)
Dept: INTERNAL MEDICINE | Facility: IMAGING CENTER | Age: 49
End: 2024-08-21
Payer: COMMERCIAL

## 2024-08-21 ENCOUNTER — HOSPITAL ENCOUNTER (OUTPATIENT)
Facility: MEDICAL CENTER | Age: 49
End: 2024-08-21
Attending: STUDENT IN AN ORGANIZED HEALTH CARE EDUCATION/TRAINING PROGRAM
Payer: COMMERCIAL

## 2024-08-21 ENCOUNTER — HOSPITAL ENCOUNTER (OUTPATIENT)
Facility: MEDICAL CENTER | Age: 49
End: 2024-08-21
Attending: INTERNAL MEDICINE
Payer: COMMERCIAL

## 2024-08-21 ENCOUNTER — APPOINTMENT (OUTPATIENT)
Dept: PHYSICAL THERAPY | Facility: MEDICAL CENTER | Age: 49
End: 2024-08-21
Payer: COMMERCIAL

## 2024-08-21 DIAGNOSIS — R68.82 DECREASED LIBIDO: ICD-10-CM

## 2024-08-21 DIAGNOSIS — Z00.00 WELL ADULT EXAM: ICD-10-CM

## 2024-08-21 LAB
ALBUMIN SERPL BCP-MCNC: 4.4 G/DL (ref 3.2–4.9)
ALBUMIN/GLOB SERPL: 1.4 G/DL
ALP SERPL-CCNC: 89 U/L (ref 30–99)
ALT SERPL-CCNC: 46 U/L (ref 2–50)
ANION GAP SERPL CALC-SCNC: 9 MMOL/L (ref 7–16)
APPEARANCE UR: CLEAR
AST SERPL-CCNC: 32 U/L (ref 12–45)
BASOPHILS # BLD AUTO: 0.8 % (ref 0–1.8)
BASOPHILS # BLD: 0.04 K/UL (ref 0–0.12)
BILIRUB SERPL-MCNC: 0.3 MG/DL (ref 0.1–1.5)
BILIRUB UR QL STRIP.AUTO: NEGATIVE
BUN SERPL-MCNC: 24 MG/DL (ref 8–22)
CALCIUM ALBUM COR SERPL-MCNC: 8.9 MG/DL (ref 8.5–10.5)
CALCIUM SERPL-MCNC: 9.2 MG/DL (ref 8.5–10.5)
CHLORIDE SERPL-SCNC: 105 MMOL/L (ref 96–112)
CHOLEST SERPL-MCNC: 195 MG/DL (ref 100–199)
CO2 SERPL-SCNC: 25 MMOL/L (ref 20–33)
COLOR UR: YELLOW
CREAT SERPL-MCNC: 0.93 MG/DL (ref 0.5–1.4)
EOSINOPHIL # BLD AUTO: 0.21 K/UL (ref 0–0.51)
EOSINOPHIL NFR BLD: 4.1 % (ref 0–6.9)
ERYTHROCYTE [DISTWIDTH] IN BLOOD BY AUTOMATED COUNT: 49.1 FL (ref 35.9–50)
EST. AVERAGE GLUCOSE BLD GHB EST-MCNC: 108 MG/DL
GFR SERPLBLD CREATININE-BSD FMLA CKD-EPI: 100 ML/MIN/1.73 M 2
GLOBULIN SER CALC-MCNC: 3.1 G/DL (ref 1.9–3.5)
GLUCOSE SERPL-MCNC: 95 MG/DL (ref 65–99)
GLUCOSE UR STRIP.AUTO-MCNC: NEGATIVE MG/DL
HBA1C MFR BLD: 5.4 % (ref 4–5.6)
HCT VFR BLD AUTO: 46.9 % (ref 42–52)
HDLC SERPL-MCNC: 72 MG/DL
HGB BLD-MCNC: 15.4 G/DL (ref 14–18)
IMM GRANULOCYTES # BLD AUTO: 0.01 K/UL (ref 0–0.11)
IMM GRANULOCYTES NFR BLD AUTO: 0.2 % (ref 0–0.9)
KETONES UR STRIP.AUTO-MCNC: NEGATIVE MG/DL
LDLC SERPL CALC-MCNC: 108 MG/DL
LEUKOCYTE ESTERASE UR QL STRIP.AUTO: NEGATIVE
LYMPHOCYTES # BLD AUTO: 1.8 K/UL (ref 1–4.8)
LYMPHOCYTES NFR BLD: 35 % (ref 22–41)
MCH RBC QN AUTO: 32 PG (ref 27–33)
MCHC RBC AUTO-ENTMCNC: 32.8 G/DL (ref 32.3–36.5)
MCV RBC AUTO: 97.3 FL (ref 81.4–97.8)
MICRO URNS: NORMAL
MONOCYTES # BLD AUTO: 0.45 K/UL (ref 0–0.85)
MONOCYTES NFR BLD AUTO: 8.7 % (ref 0–13.4)
NEUTROPHILS # BLD AUTO: 2.64 K/UL (ref 1.82–7.42)
NEUTROPHILS NFR BLD: 51.2 % (ref 44–72)
NITRITE UR QL STRIP.AUTO: NEGATIVE
NRBC # BLD AUTO: 0 K/UL
NRBC BLD-RTO: 0 /100 WBC (ref 0–0.2)
PH UR STRIP.AUTO: 5.5 [PH] (ref 5–8)
PLATELET # BLD AUTO: 191 K/UL (ref 164–446)
PMV BLD AUTO: 11.4 FL (ref 9–12.9)
POTASSIUM SERPL-SCNC: 4.2 MMOL/L (ref 3.6–5.5)
PROT SERPL-MCNC: 7.5 G/DL (ref 6–8.2)
PROT UR QL STRIP: NEGATIVE MG/DL
PSA SERPL-MCNC: 0.81 NG/ML (ref 0–4)
RBC # BLD AUTO: 4.82 M/UL (ref 4.7–6.1)
RBC UR QL AUTO: NEGATIVE
SODIUM SERPL-SCNC: 139 MMOL/L (ref 135–145)
SP GR UR STRIP.AUTO: 1.03
TRIGL SERPL-MCNC: 77 MG/DL (ref 0–149)
TSH SERPL-ACNC: 3.94 UIU/ML (ref 0.35–5.5)
UROBILINOGEN UR STRIP.AUTO-MCNC: 0.2 MG/DL
WBC # BLD AUTO: 5.2 K/UL (ref 4.8–10.8)

## 2024-08-21 PROCEDURE — 84402 ASSAY OF FREE TESTOSTERONE: CPT

## 2024-08-21 PROCEDURE — 82172 ASSAY OF APOLIPOPROTEIN: CPT

## 2024-08-21 PROCEDURE — 99999 PR NO CHARGE: CPT

## 2024-08-21 PROCEDURE — 84443 ASSAY THYROID STIM HORMONE: CPT

## 2024-08-21 PROCEDURE — 83036 HEMOGLOBIN GLYCOSYLATED A1C: CPT

## 2024-08-21 PROCEDURE — 84153 ASSAY OF PSA TOTAL: CPT

## 2024-08-21 PROCEDURE — 80053 COMPREHEN METABOLIC PANEL: CPT

## 2024-08-21 PROCEDURE — 81003 URINALYSIS AUTO W/O SCOPE: CPT

## 2024-08-21 PROCEDURE — 80061 LIPID PANEL: CPT

## 2024-08-21 PROCEDURE — 84403 ASSAY OF TOTAL TESTOSTERONE: CPT

## 2024-08-21 PROCEDURE — 85025 COMPLETE CBC W/AUTO DIFF WBC: CPT

## 2024-08-21 PROCEDURE — 84270 ASSAY OF SEX HORMONE GLOBUL: CPT

## 2024-08-21 PROCEDURE — 83695 ASSAY OF LIPOPROTEIN(A): CPT

## 2024-08-21 NOTE — PROGRESS NOTES
Fish Sandoval is a 48 y.o. male here for a non-provider visit for a lab draw on 8/21/2024 at 11:26 AM.    Procedure performed:  Venipuncture     Anatomical site:  Left Antecubital Area    Equipment used:  21 g vacutainer     Labs drawn:  annual labs    Ordering provider:  Hemanth Bland MD    Lab draw completed by:  Tamela Whatley R.N.

## 2024-08-23 LAB
APO B100 SERPL-MCNC: 84 MG/DL (ref 66–133)
LPA SERPL-MCNC: 51 MG/DL
SHBG SERPL-SCNC: 43 NMOL/L (ref 17–56)
TESTOST FREE MFR SERPL: 1.6 % (ref 1.6–2.9)
TESTOST FREE SERPL-MCNC: 62 PG/ML (ref 47–244)
TESTOST SERPL-MCNC: 391 NG/DL (ref 300–890)

## 2024-08-28 ENCOUNTER — PHYSICAL THERAPY (OUTPATIENT)
Dept: PHYSICAL THERAPY | Facility: MEDICAL CENTER | Age: 49
End: 2024-08-28
Attending: PHYSICAL MEDICINE & REHABILITATION
Payer: COMMERCIAL

## 2024-08-28 DIAGNOSIS — M25.552 CHRONIC HIP PAIN, BILATERAL: ICD-10-CM

## 2024-08-28 DIAGNOSIS — G89.29 CHRONIC HIP PAIN, BILATERAL: ICD-10-CM

## 2024-08-28 DIAGNOSIS — G89.29 CHRONIC RIGHT-SIDED LOW BACK PAIN WITH RIGHT-SIDED SCIATICA: ICD-10-CM

## 2024-08-28 DIAGNOSIS — M54.41 CHRONIC RIGHT-SIDED LOW BACK PAIN WITH RIGHT-SIDED SCIATICA: ICD-10-CM

## 2024-08-28 DIAGNOSIS — M25.551 CHRONIC HIP PAIN, BILATERAL: ICD-10-CM

## 2024-08-28 PROCEDURE — 97012 MECHANICAL TRACTION THERAPY: CPT

## 2024-08-28 PROCEDURE — 97110 THERAPEUTIC EXERCISES: CPT

## 2024-08-28 NOTE — OP THERAPY DAILY TREATMENT
"  Outpatient Physical Therapy  DAILY TREATMENT     [facility] Outpatient Physical Therapy  [address]  [place] [address]  Phone:  [phone]  Fax:  [phone]    Date: 08/28/2024    Patient: Fish Sandoval  YOB: 1975  MRN: [id number]     Time Calculation    Start time: 1546             Chief Complaint: Back Problem and Hip Problem    Visit #: 13    SUBJECTIVE:  Has an appt tomorrow   Still completing LMNT stick once at night; not noticing huge changes. Has less headaches and less trips to the bathroom. Noticed that his legs felt like jelly and felt like he was teetering to the side. Back is at a plateau, no worse and no better. Has been completing the WITYs. Will get relief from the balls at the QL; has relief for 2-3 hours and sometimes all night. Back appears as it has improved but will continue to experience pain after about 10 min (about 50-60% back overall; about 25% better with sitting). Completed t/s rotations a few days ago.     Objective:   L/S ROM-pre treatment:   L/s flexion WFL \"tight\"  L/s extension min restricted  L/s SB WFL B with discomfort at end ranges  L/s rotation WFL (improved to L>R)  Side glide worse hips to R than L     L/s AROM post-treatment:  L/s extension WFL  Improved l/s side glides and SB with decr discomfort, about 30% better.    Therapeutic Exercises (CPT 09932):     3. pt education, re: modify tilt and complete to lesser degree to decrease pain    4. indep QL self muscle release using lacrosse ball, x4 min at wall, verbal review    5. diaphragmatic breathing, x2 min supine, NT    6. 1/s kneeling hip flexor stretch on foam pad, x30 sec ea, NT    8. ATY's in standing, pink TB, 2x 3-5 sec holds, NT    12. hip flexor eccentric exercise, 35# dumbbell foot anchor with active hinging and 90/90 at contralat hip; x10 ea then x5 again on R, NT    13. lateral lunges, x15 ea, NT    14. active hamstring stretch, x15 ea, NT    15. " LTR, knees to R; x10, improved l/s s/s    17. isometric glute med at wall, 30 sec-hip flexed, hip neutral, hip extended, NT    18. SL sit to stands, 2x10 ea, NT    19. mermaid for hip mobility, x5 ea, increased tightnes with R hip in IR; improved l/s motion following  cindy into extension hep    20. pt education, re: mermaid and self STM with QL as focus of hep until next session with intermit strengthening      Therapeutic Exercise Summary: Pt performed these exercises with instruction and SPV.  Provided handout with these exercises for daily HEP.        Therapeutic Treatments and Modalities:     3. Mechanical Traction (CPT 17372), 27/10# c/s traction with mhp x15 min    4. Mechanical Traction (CPT 84631), 115/50# mech traction with mhp x10 min    Time-based treatments/modalities:         Pain pre treatment: 3.5/10 L and central LBP; 0/10 hip stiffness         ASSESSMENT:   Response to treatment:   Noted deficits in hip IR on R compared to L while in mermaid position. Improved motion following mermaid for hip mobility; will make this and self QL release priorities for current hep. Plan to work on strengthening at least 1x/wk to maintain but otherwise focus on these two exercises.     PLAN/RECOMMENDATIONS:   Plan for treatment: therapy treatment to continue next visit.  Planned interventions for next visit: continue with current treatment.

## 2024-08-29 ENCOUNTER — OFFICE VISIT (OUTPATIENT)
Dept: INTERNAL MEDICINE | Facility: IMAGING CENTER | Age: 49
End: 2024-08-29
Payer: COMMERCIAL

## 2024-08-29 VITALS
BODY MASS INDEX: 24.2 KG/M2 | HEART RATE: 65 BPM | HEIGHT: 70 IN | WEIGHT: 169 LBS | RESPIRATION RATE: 12 BRPM | SYSTOLIC BLOOD PRESSURE: 110 MMHG | DIASTOLIC BLOOD PRESSURE: 64 MMHG | TEMPERATURE: 97.6 F | OXYGEN SATURATION: 95 %

## 2024-08-29 DIAGNOSIS — N48.6 PEYRONIE'S DISEASE: ICD-10-CM

## 2024-08-29 DIAGNOSIS — Z00.00 WELL ADULT EXAM: ICD-10-CM

## 2024-08-29 DIAGNOSIS — E78.00 PURE HYPERCHOLESTEROLEMIA: ICD-10-CM

## 2024-08-29 DIAGNOSIS — G47.33 OSA (OBSTRUCTIVE SLEEP APNEA): ICD-10-CM

## 2024-08-29 DIAGNOSIS — E78.41 ELEVATED LIPOPROTEIN(A): ICD-10-CM

## 2024-08-29 ASSESSMENT — PATIENT HEALTH QUESTIONNAIRE - PHQ9: CLINICAL INTERPRETATION OF PHQ2 SCORE: 0

## 2024-08-29 ASSESSMENT — FIBROSIS 4 INDEX: FIB4 SCORE: 1.21

## 2024-08-29 NOTE — PROGRESS NOTES
Chief Complaint   Patient presents with    Annual Exam       HISTORY OF THE PRESENT ILLNESS: Patient is a 48 y.o. male.     Patient comes in for annual health risk assessment, physical and review of laboratory.  Considers himself in good health.  He has a strict exercise and diet program.  He has noticed some balance issues but no falls.  He is up-to-date on colon cancer screening.  PSA is unchanged.    He has a history of sleep apnea.  This has been stable on CPAP.  He had overnight oximetry last year that showed 0% desaturations.  He is 100% compliant with device.    Has a history of mild dyslipidemia.  His LDL cholesterol is 108.  HDL is elevated at 73.  He had a negative cardiac CT last year.  His recent labs also show mild elevation in LP(a).     Patient has history of Peyronie's disease.  He is followed by urology.  He is on no specific treatment.    Allergies: Pcn [penicillins]    Current Outpatient Medications Ordered in Epic   Medication Sig Dispense Refill    NON SPECIFIED C-Verapamil 15% Perme8, apply topically to effected area daily as prescribed.  Disp 30g, 3 refills 30 Each 3    TURMERIC PO Take 1 Cap by mouth.      vitamin D, cholecalciferol, 400 units Tab tablet Take 400 Units by mouth.      Cyanocobalamin (VITAMIN B-12) 1000 MCG Tab Take 1,000 mcg by mouth.      Multiple Vitamin (MULTI-VITAMINS) Tab Take 1 Tab by mouth.      tadalafil (CIALIS) 5 MG tablet Cialis 5 mg tablet   TAKE 1 TABLET BY MOUTH EVERY DAY AS DIRECTED BY MD      mupirocin (BACTROBAN) 2 % Ointment Apply 1 Application to affected area(s) 2 times a day. 1 Tube 0     No current Epic-ordered facility-administered medications on file.       Past medical history, social history and family history were reviewed from chart today    Review of systems: Per HPI.    All others negative.     Exam: /64 (BP Location: Left arm, Patient Position: Sitting, BP Cuff Size: Adult)   Pulse 65   Temp 36.4 °C (97.6 °F) (Temporal)   Resp 12   Ht  "1.765 m (5' 9.5\")   Wt 76.7 kg (169 lb)   SpO2 95%   General: Well-appearing. Well-developed. No signs of distress.  HEENT: Grossly normal. Oral cavity is pink and moist.   Neck: Supple without JVD or bruit.  Pulmonary: Clear with good breath sounds. Normal effort.  Cardiovascular: Regular. Carotid and radial pulses are intact.  Abdomen: Soft, nontender, nondistended. Spleen and liver are not enlarged.  Neurologic: Cranial nerves II through XII are grossly normal, alert and oriented x3      Diagnosis:  1. Well adult exam        2. ZHOU (obstructive sleep apnea)        3. Peyronie's disease        4. Pure hypercholesterolemia        5. Elevated lipoprotein(a)          48-year-old male who remains in excellent physical health.  Chronic issues are stable on current medications.  Lipids are above goal but no findings consistent with atherosclerosis.  He is currently not on statin or other therapy.  Goal is LDL cholesterol less than 100.  He has modestly elevated LP(a).    My total time spent caring for the patient on the day of the encounter was  greater than 30 minutes.   This includes obtaining history, reviewing chart, physical exam, patient education, reviewing outside records, placing orders, interpreting tests and coordinating care.    Portions of this note were completed using voice recognition software (Dragon Naturally speaking software) . Occasional transcription errors may have escaped proof reading. I have made every reasonable attempt to correct obvious errors, but I expect that there are errors of grammar and possibly content that I did not discover before finalizing the note.   "

## 2024-09-09 ENCOUNTER — PHYSICAL THERAPY (OUTPATIENT)
Dept: PHYSICAL THERAPY | Facility: MEDICAL CENTER | Age: 49
End: 2024-09-09
Attending: PHYSICAL MEDICINE & REHABILITATION
Payer: COMMERCIAL

## 2024-09-09 DIAGNOSIS — G89.29 CHRONIC RIGHT-SIDED LOW BACK PAIN WITH RIGHT-SIDED SCIATICA: ICD-10-CM

## 2024-09-09 DIAGNOSIS — M54.41 CHRONIC RIGHT-SIDED LOW BACK PAIN WITH RIGHT-SIDED SCIATICA: ICD-10-CM

## 2024-09-09 PROCEDURE — 97012 MECHANICAL TRACTION THERAPY: CPT

## 2024-09-09 PROCEDURE — 97110 THERAPEUTIC EXERCISES: CPT

## 2024-09-09 NOTE — OP THERAPY DAILY TREATMENT
"  Outpatient Physical Therapy  DAILY TREATMENT     [facility]  [address]  [place] [address]  Phone:  [phone]  Fax:  [phone]    Date: 09/09/2024    Patient: Fish Sandoval  YOB: 1975  MRN: [id number]     Time Calculation    Start time: 1546 Stop time: 1647 Time Calculation (min): 61 minutes         Chief Complaint: Back Problem    Visit #: 13    SUBJECTIVE:  Pt reporting he can get relief with using QL release. Notices a significant relief with the mermaid exercise. Notices some improvement with standing>sitting. Back pain has improved but still has difficulty with sitting tolerance as usual; somewhat better. Will notice some hip discomfort when performing the mermaid, cindy with sustained positions such as lying.     Objective:   L/S ROM-pre treatment:   L/s flexion WFL \"tight\"  L/s extension min restricted  L/s SB WFL B with discomfort at end ranges  L/s rotation WFL (improved to L>R)  Side glide worse hips to R than L     L/s AROM post-treatment:  L/s extension WFL  Improved l/s side glides and SB with decr discomfort, about 30% better.    Therapeutic Exercises (CPT 53339):     4. indep QL self muscle release using lacrosse ball, x4 min at wall, NT    5. diaphragmatic breathing, x2 min supine, NT    6. 1/s kneeling hip flexor stretch on foam pad, x30 sec ea, NT    7. butterfly stretch, x30 sec    8. pigeon stretch, x30 sec ea, hep    9. prone hip IR with band, x15, hep    10. seated hip IR with ball and band around ankles, x15, 2-3 sec hold; pink TB, hep    11. SL reverse clamshells, x15 2-3 sec hold pink TB, hep    12. hip flexor eccentric exercise, 35# dumbbell foot anchor with active hinging and 90/90 at contralat hip; x10 ea then x5 again on R, NT    13. lateral lunges, x15 ea, NT    14. active hamstring stretch, x15 ea, NT    15. LTR, knees to R; x10, NT    17. isometric glute med at wall, 30 sec-hip flexed, hip " "neutral, hip extended, NT    18. SL sit to stands, 2x10 ea, NT    19. mermaid for hip mobility, x15 ea, reviewed    20. pt education, monitoring strength and mermaid combination for impact on hip twinges      Therapeutic Exercise Summary: Pt performed these exercises with instruction and SPV.  Provided handout with these exercises for daily HEP.        Therapeutic Treatments and Modalities:     3. Mechanical Traction (CPT 40369), 27/10# c/s traction with mhp x10 min    4. Mechanical Traction (CPT 79214), 115/50# mech traction with mhp x10 min    Time-based treatments/modalities:    Physical Therapy Timed Treatment Charges  Therapeutic exercise minutes (CPT 65562): 41 minutes    Pain pre treatment: 3/10 L and central LBP; 3.5/10 hip stiffness   Pain post treatment: back-\"fatigue;\" hip-2/10      ASSESSMENT:   Response to treatment:   Pt demonstrating accessory muscle weakness at R>L hip; fatigue with hip internal rotation and adductor strengthening in session. Discussion to trial muscle strengthening following mermaid exercise and decrease intensity of mobilization to assess impact on R hip soreness. Overall, on good trajectory and reporting pos impact of mermaid and self QL release on ADL's and work-related tasks.    PLAN/RECOMMENDATIONS:   Plan for treatment: therapy treatment to continue next visit.  Planned interventions for next visit: continue with current treatment.      "

## 2024-09-16 ENCOUNTER — APPOINTMENT (OUTPATIENT)
Dept: PHYSICAL THERAPY | Facility: MEDICAL CENTER | Age: 49
End: 2024-09-16
Attending: PHYSICAL MEDICINE & REHABILITATION
Payer: COMMERCIAL

## 2024-09-23 ENCOUNTER — PHYSICAL THERAPY (OUTPATIENT)
Dept: PHYSICAL THERAPY | Facility: MEDICAL CENTER | Age: 49
End: 2024-09-23
Attending: PHYSICAL MEDICINE & REHABILITATION
Payer: COMMERCIAL

## 2024-09-23 DIAGNOSIS — G89.29 CHRONIC RIGHT-SIDED LOW BACK PAIN WITH RIGHT-SIDED SCIATICA: ICD-10-CM

## 2024-09-23 DIAGNOSIS — M54.41 CHRONIC RIGHT-SIDED LOW BACK PAIN WITH RIGHT-SIDED SCIATICA: ICD-10-CM

## 2024-09-23 PROCEDURE — 97012 MECHANICAL TRACTION THERAPY: CPT

## 2024-09-23 PROCEDURE — 97110 THERAPEUTIC EXERCISES: CPT

## 2024-09-23 PROCEDURE — 97140 MANUAL THERAPY 1/> REGIONS: CPT

## 2024-09-23 NOTE — OP THERAPY DAILY TREATMENT
"  Outpatient Physical Therapy  DAILY TREATMENT     [facility]  [address]  [place] [address]  Phone:  [phone]  Fax:  [phone]    Date: 09/23/2024    Patient: Fish Sandoval  YOB: 1975  MRN: [id number]     Time Calculation    Start time: 1632 Stop time: 1739 Time Calculation (min): 67 minutes         Chief Complaint: Back Problem    Visit #: 13    SUBJECTIVE:  Pt has soreness in the R hip for about 30 min; but mermaid still helps the back. Since introducing new exercises, noticing about 35-50% improvement. R hip is consistently stiff but will still become sore intermittently after mermaid. Hip will become sore     Objective:   L/S ROM-pre treatment:   L/s flexion WFL \"tight\"  L/s extension min restricted  L/s SB WFL B with discomfort at end ranges  L/s rotation WFL (improved to L>R)  Side glide worse hips to R than L     L/s AROM post-treatment:  L/s extension WFL  Improved l/s side glides and SB with decr discomfort, about 30% better.    Therapeutic Exercises (CPT 34902):     2. pt education, re: mermaid or psoas release before lounging    3. hip adductor stretching, 2x15, reviewed    4. indep QL self muscle release using lacrosse ball, x4 min at wall, NT    5. diaphragmatic breathing, x2 min supine, NT    6. 1/s kneeling hip flexor stretch on foam pad, x30 sec ea, reviewed //improved following manual    7. butterfly stretch, x30 sec, NT    8. pigeon stretch, x30 sec ea, NT    9. prone hip IR with band, x15 pink TB, verbal review    10. seated hip IR with ball and band around ankles, x15, 2-3 sec hold; pink TB, verbal review    11. SL reverse clamshells, x15 2-3 sec hold pink TB, verbal review    12. hip flexor eccentric exercise, 35# dumbbell foot anchor with active hinging and 90/90 at contralat hip; x10 ea then x5 again on R, NT    13. lateral lunges, x15 ea, NT    14. active hamstring stretch, x15 ea, NT    15. LTR, " "knees to R; x10, NT    17. isometric glute med at wall, 30 sec-hip flexed, hip neutral, hip extended, NT    18. SL sit to stands, 2x10 ea, NT    19. mermaid for hip mobility, x15 ea, verbal review      Therapeutic Exercise Summary: Pt performed these exercises with instruction and SPV.  Provided handout with these exercises for daily HEP.        Therapeutic Treatments and Modalities:     3. Mechanical Traction (CPT 14381), 27/10# c/s traction with mhp x10 min    4. Mechanical Traction (CPT 95782), 115/50# mech traction with mhp x10 min    5. Manual Therapy (CPT 23197), STM to R psoas followed by MONI beltran gr III and IV to R hip    Time-based treatments/modalities:    Physical Therapy Timed Treatment Charges  Manual therapy minutes (CPT 11324): 15 minutes  Therapeutic exercise minutes (CPT 21413): 32 minutes    Pain pre treatment: 3/10 L and central LBP; 3.5/10 hip stiffness   Pain post treatment: back-\"fatigue;\" hip-2/10      ASSESSMENT:   Response to treatment:   Good tolerance to manual without discomfort; significant improvement in hip extension as observed with repetition of half kneeling hip flexor stretch. Will continue to promote hip flexibility to see its' effects on back and hip s/s. Pt education today to complete QL release vs. Mermaid in the evening before lounging to assess whether he has decrease in s/s. Overall, on good trajectory.    PLAN/RECOMMENDATIONS:   Plan for treatment: therapy treatment to continue next visit.  Planned interventions for next visit: continue with current treatment.      "

## 2024-09-30 ENCOUNTER — PHYSICAL THERAPY (OUTPATIENT)
Dept: PHYSICAL THERAPY | Facility: MEDICAL CENTER | Age: 49
End: 2024-09-30
Attending: PHYSICAL MEDICINE & REHABILITATION
Payer: COMMERCIAL

## 2024-09-30 DIAGNOSIS — G89.29 CHRONIC RIGHT-SIDED LOW BACK PAIN WITH RIGHT-SIDED SCIATICA: ICD-10-CM

## 2024-09-30 DIAGNOSIS — M54.41 CHRONIC RIGHT-SIDED LOW BACK PAIN WITH RIGHT-SIDED SCIATICA: ICD-10-CM

## 2024-09-30 PROCEDURE — 97110 THERAPEUTIC EXERCISES: CPT

## 2024-09-30 NOTE — OP THERAPY DAILY TREATMENT
Outpatient Physical Therapy  DAILY TREATMENT     [facility] Out[facility] Therapy  [address]  [place] [address]  Phone:  [phone]  Fax:  [phone]    Date: 09/30/2024    Patient: Fish Sandoval  YOB: 1975  MRN: [id number]     Time Calculation    Start time: 1625             Chief Complaint: Back Problem    Visit #: 13    SUBJECTIVE:  Pt reporting he tried HS and quad stretches but noticed brief compression sensation in back. Is currently alternating his hep. New go to exercises are the mermaid and the QL stretches. Tried the mermaid before lounging and found this helpful. Used a pillow with the new chair. Pt stating he has worn compression calf stockings.     Pt stating he would like to focus on the hip or back; will still notice he doesn't have as much comfort while he is sitting. Noticed some improvements with the manual that was completed last session. But this returned to baseline after about 2-3 days.     Objective:       Therapeutic Exercises (CPT 83295):     2. pt education, re: mermaid or psoas release before lounging    3. hip adductor stretching, 2x15, reviewed    4. indep QL self muscle release using lacrosse ball, x4 min at wall, NT    5. diaphragmatic breathing, x2 min supine, NT    6. 1/s kneeling hip flexor stretch on foam pad, x30 sec ea, reviewed //improved following manual    7. butterfly stretch, x30 sec, NT    8. pigeon stretch, x30 sec ea, NT    9. prone hip IR with band, x15 pink TB, verbal review    10. seated hip IR with ball and band around ankles, x15, 2-3 sec hold; pink TB, verbal review    11. SL reverse clamshells, x15 2-3 sec hold pink TB, verbal review    12. hip flexor eccentric exercise, 35# dumbbell foot anchor with active hinging and 90/90 at contralat hip; x10 ea then x5 again on R, NT    13. lateral lunges, x15 ea, NT    14. active hamstring stretch, x15 ea, NT    15. LTR, knees to R; x10, NT    17.  "isometric glute med at wall, 30 sec-hip flexed, hip neutral, hip extended, NT    18. SL sit to stands, 2x10 ea, NT    19. mermaid for hip mobility, x15 ea, verbal review      Therapeutic Exercise Summary: Pt performed these exercises with instruction and SPV.  Provided handout with these exercises for daily HEP.        Therapeutic Treatments and Modalities:     3. Mechanical Traction (CPT 00979), 27/10# c/s traction with mhp x10 min, NT    4. Mechanical Traction (CPT 29290), 115/50# mech traction with mhp x10 min    5. Manual Therapy (CPT 42521)    Therapeutic Treatment and Modalities Summary: STM to R psoas followed by MONI beltran gr III and IV to R hip    Time-based treatments/modalities:         Pain pre treatment: 3/10 L and central LBP; 3.5/10 hip stiffness   Pain post treatment: back-\"fatigue;\" hip-2/10      ASSESSMENT:   Response to treatment:       Good tolerance to manual without discomfort; significant improvement in hip extension as observed with repetition of half kneeling hip flexor stretch. Will continue to promote hip flexibility to see its' effects on back and hip s/s. Pt education today to complete QL release vs. Mermaid in the evening before lounging to assess whether he has decrease in s/s. Overall, on good trajectory.    PLAN/RECOMMENDATIONS:   Plan for treatment: therapy treatment to continue next visit.  Planned interventions for next visit: continue with current treatment.      "

## 2024-10-03 DIAGNOSIS — M62.89 PELVIC FLOOR DYSFUNCTION: ICD-10-CM

## 2024-10-07 ENCOUNTER — PHYSICAL THERAPY (OUTPATIENT)
Dept: PHYSICAL THERAPY | Facility: MEDICAL CENTER | Age: 49
End: 2024-10-07
Attending: PHYSICAL MEDICINE & REHABILITATION
Payer: COMMERCIAL

## 2024-10-07 DIAGNOSIS — M54.41 CHRONIC RIGHT-SIDED LOW BACK PAIN WITH RIGHT-SIDED SCIATICA: ICD-10-CM

## 2024-10-07 DIAGNOSIS — G89.29 CHRONIC RIGHT-SIDED LOW BACK PAIN WITH RIGHT-SIDED SCIATICA: ICD-10-CM

## 2024-10-07 PROCEDURE — 97012 MECHANICAL TRACTION THERAPY: CPT

## 2024-10-07 PROCEDURE — 97110 THERAPEUTIC EXERCISES: CPT

## 2024-10-14 ENCOUNTER — PHYSICAL THERAPY (OUTPATIENT)
Dept: PHYSICAL THERAPY | Facility: MEDICAL CENTER | Age: 49
End: 2024-10-14
Attending: PHYSICAL MEDICINE & REHABILITATION
Payer: COMMERCIAL

## 2024-10-14 DIAGNOSIS — M25.551 CHRONIC HIP PAIN, BILATERAL: ICD-10-CM

## 2024-10-14 DIAGNOSIS — G89.29 CHRONIC HIP PAIN, BILATERAL: ICD-10-CM

## 2024-10-14 DIAGNOSIS — M54.41 CHRONIC RIGHT-SIDED LOW BACK PAIN WITH RIGHT-SIDED SCIATICA: ICD-10-CM

## 2024-10-14 DIAGNOSIS — G89.29 CHRONIC RIGHT-SIDED LOW BACK PAIN WITH RIGHT-SIDED SCIATICA: ICD-10-CM

## 2024-10-14 DIAGNOSIS — M25.552 CHRONIC HIP PAIN, BILATERAL: ICD-10-CM

## 2024-10-14 PROCEDURE — 97110 THERAPEUTIC EXERCISES: CPT

## 2024-10-14 PROCEDURE — 97012 MECHANICAL TRACTION THERAPY: CPT

## 2024-10-21 ENCOUNTER — APPOINTMENT (OUTPATIENT)
Dept: PHYSICAL THERAPY | Facility: MEDICAL CENTER | Age: 49
End: 2024-10-21
Attending: PHYSICAL MEDICINE & REHABILITATION
Payer: COMMERCIAL

## 2024-10-23 ENCOUNTER — OFFICE VISIT (OUTPATIENT)
Dept: INTERNAL MEDICINE | Facility: IMAGING CENTER | Age: 49
End: 2024-10-23
Payer: COMMERCIAL

## 2024-10-23 VITALS
DIASTOLIC BLOOD PRESSURE: 60 MMHG | SYSTOLIC BLOOD PRESSURE: 106 MMHG | RESPIRATION RATE: 12 BRPM | TEMPERATURE: 97.9 F | HEART RATE: 75 BPM | OXYGEN SATURATION: 94 %

## 2024-10-23 DIAGNOSIS — R21 RASH AND OTHER NONSPECIFIC SKIN ERUPTION: ICD-10-CM

## 2024-10-23 PROCEDURE — 3078F DIAST BP <80 MM HG: CPT | Performed by: INTERNAL MEDICINE

## 2024-10-23 PROCEDURE — 99213 OFFICE O/P EST LOW 20 MIN: CPT | Performed by: INTERNAL MEDICINE

## 2024-10-23 PROCEDURE — 3074F SYST BP LT 130 MM HG: CPT | Performed by: INTERNAL MEDICINE

## 2024-10-28 ENCOUNTER — OFFICE VISIT (OUTPATIENT)
Dept: INTERNAL MEDICINE | Facility: IMAGING CENTER | Age: 49
End: 2024-10-28
Payer: COMMERCIAL

## 2024-10-28 ENCOUNTER — PHYSICAL THERAPY (OUTPATIENT)
Dept: PHYSICAL THERAPY | Facility: MEDICAL CENTER | Age: 49
End: 2024-10-28
Attending: PHYSICAL MEDICINE & REHABILITATION
Payer: COMMERCIAL

## 2024-10-28 ENCOUNTER — HOSPITAL ENCOUNTER (OUTPATIENT)
Facility: MEDICAL CENTER | Age: 49
End: 2024-10-28
Attending: INTERNAL MEDICINE
Payer: COMMERCIAL

## 2024-10-28 VITALS
TEMPERATURE: 97.4 F | DIASTOLIC BLOOD PRESSURE: 70 MMHG | RESPIRATION RATE: 12 BRPM | SYSTOLIC BLOOD PRESSURE: 106 MMHG | HEART RATE: 75 BPM | OXYGEN SATURATION: 95 %

## 2024-10-28 DIAGNOSIS — L13.0 DERMATITIS HERPETIFORMIS: ICD-10-CM

## 2024-10-28 DIAGNOSIS — M54.41 CHRONIC RIGHT-SIDED LOW BACK PAIN WITH RIGHT-SIDED SCIATICA: ICD-10-CM

## 2024-10-28 DIAGNOSIS — G89.29 CHRONIC RIGHT-SIDED LOW BACK PAIN WITH RIGHT-SIDED SCIATICA: ICD-10-CM

## 2024-10-28 DIAGNOSIS — R21 RASH: ICD-10-CM

## 2024-10-28 LAB
BASOPHILS # BLD AUTO: 0.9 % (ref 0–1.8)
BASOPHILS # BLD: 0.04 K/UL (ref 0–0.12)
CRP SERPL HS-MCNC: <0.3 MG/DL (ref 0–0.75)
EOSINOPHIL # BLD AUTO: 0.14 K/UL (ref 0–0.51)
EOSINOPHIL NFR BLD: 3.2 % (ref 0–6.9)
ERYTHROCYTE [DISTWIDTH] IN BLOOD BY AUTOMATED COUNT: 46.7 FL (ref 35.9–50)
HCT VFR BLD AUTO: 46.5 % (ref 42–52)
HGB BLD-MCNC: 16 G/DL (ref 14–18)
IMM GRANULOCYTES # BLD AUTO: 0.01 K/UL (ref 0–0.11)
IMM GRANULOCYTES NFR BLD AUTO: 0.2 % (ref 0–0.9)
LYMPHOCYTES # BLD AUTO: 1.41 K/UL (ref 1–4.8)
LYMPHOCYTES NFR BLD: 32.3 % (ref 22–41)
MCH RBC QN AUTO: 32.7 PG (ref 27–33)
MCHC RBC AUTO-ENTMCNC: 34.4 G/DL (ref 32.3–36.5)
MCV RBC AUTO: 95.1 FL (ref 81.4–97.8)
MONOCYTES # BLD AUTO: 0.38 K/UL (ref 0–0.85)
MONOCYTES NFR BLD AUTO: 8.7 % (ref 0–13.4)
NEUTROPHILS # BLD AUTO: 2.39 K/UL (ref 1.82–7.42)
NEUTROPHILS NFR BLD: 54.7 % (ref 44–72)
NRBC # BLD AUTO: 0 K/UL
NRBC BLD-RTO: 0 /100 WBC (ref 0–0.2)
PLATELET # BLD AUTO: 198 K/UL (ref 164–446)
PMV BLD AUTO: 10.9 FL (ref 9–12.9)
RBC # BLD AUTO: 4.89 M/UL (ref 4.7–6.1)
WBC # BLD AUTO: 4.4 K/UL (ref 4.8–10.8)

## 2024-10-28 PROCEDURE — 97110 THERAPEUTIC EXERCISES: CPT

## 2024-10-28 PROCEDURE — 86258 DGP ANTIBODY EACH IG CLASS: CPT | Mod: 91

## 2024-10-28 PROCEDURE — 3074F SYST BP LT 130 MM HG: CPT | Performed by: INTERNAL MEDICINE

## 2024-10-28 PROCEDURE — 86364 TISS TRNSGLTMNASE EA IG CLAS: CPT | Mod: 91

## 2024-10-28 PROCEDURE — 86140 C-REACTIVE PROTEIN: CPT

## 2024-10-28 PROCEDURE — 99213 OFFICE O/P EST LOW 20 MIN: CPT | Performed by: INTERNAL MEDICINE

## 2024-10-28 PROCEDURE — 3078F DIAST BP <80 MM HG: CPT | Performed by: INTERNAL MEDICINE

## 2024-10-28 PROCEDURE — 85025 COMPLETE CBC W/AUTO DIFF WBC: CPT

## 2024-10-28 PROCEDURE — 97012 MECHANICAL TRACTION THERAPY: CPT

## 2024-10-28 RX ORDER — MOMETASONE FUROATE 1 MG/G
CREAM TOPICAL
Qty: 15 G | Refills: 1 | Status: SHIPPED | OUTPATIENT
Start: 2024-10-28

## 2024-10-30 LAB
GLIADIN IGA SER IA-ACNC: <0.72 FLU (ref 0–4.99)
TTG IGA SER IA-ACNC: <1.02 FLU (ref 0–4.99)

## 2024-10-31 LAB
GLIADIN IGG SER IA-ACNC: <0.56 FLU (ref 0–4.99)
TTG IGG SER IA-ACNC: <0.82 FLU (ref 0–4.99)

## 2024-11-04 ENCOUNTER — PHYSICAL THERAPY (OUTPATIENT)
Dept: PHYSICAL THERAPY | Facility: MEDICAL CENTER | Age: 49
End: 2024-11-04
Attending: PHYSICAL MEDICINE & REHABILITATION
Payer: COMMERCIAL

## 2024-11-04 DIAGNOSIS — G89.29 CHRONIC RIGHT-SIDED LOW BACK PAIN WITH RIGHT-SIDED SCIATICA: ICD-10-CM

## 2024-11-04 DIAGNOSIS — M54.41 CHRONIC RIGHT-SIDED LOW BACK PAIN WITH RIGHT-SIDED SCIATICA: ICD-10-CM

## 2024-11-04 PROCEDURE — 97110 THERAPEUTIC EXERCISES: CPT

## 2024-11-04 NOTE — OP THERAPY DAILY TREATMENT
Outpatient Physical Therapy  DAILY TREATMENT     [facility] Out[facility] Therapy  [address]  [place] [address]  Phone:  [phone]  Fax:  [phone]    Date: 11/04/2024    Patient: Fish Sandoval  YOB: 1975  MRN: [id number]     Time Calculation    Start time: 1624 Stop time: 1733 Time Calculation (min): 69 minutes         Chief Complaint: Back Problem and Hip Problem    Visit #: 13    SUBJECTIVE:  Stating he will follow up with pelvic floor tomorrow. Is wearing SIJ belt for the walks and still providing relief. Has been monitoring his sitting and believes it is a work in progress. States this takes a lot of concentration. Is able to sit for about ten minutes. About 20-25% improved with use of belt.       Objective:   Posture: alternating between flat back and sway back positioning    Therapeutic Exercises (CPT 32488):     3. review of hep    4. SL bridging, 2x10, NT    6. SIJ belt donning demonstrating, verbal review    8. pt education, re: sitting with increased APT, reviewed    9. pelvic tilting in sitting, lying, standing, x3 min ea, reviewed    10. pt education, re: posture management in standing, adjusting sway and flat back posturing    19. trial of SIJ belt, verbal review    20. self MET for L functional longer leg, x5 min, verbal review      Therapeutic Exercise Summary: Pt performed these exercises with instruction and SPV.  Provided handout with these exercises for daily HEP.        Therapeutic Treatments and Modalities:     3. Mechanical Traction (CPT 33135), 27/10# c/s traction with mhp x10 min    4. Mechanical Traction (CPT 40196), 115/50# mech traction with mhp x10 min    Time-based treatments/modalities:    Physical Therapy Timed Treatment Charges  Therapeutic exercise minutes (CPT 18030): 49 minutes      ASSESSMENT:   Response to treatment:   Review of seated and standing postural corrections. Discussed core engagement and  avoiding compensations for weight lifting. Pt is reporting cont'd adequate challenge with his current hep. Pt is being followed by pelvic floor PT for additional therapy. Anticipate DC in next visit.       PLAN/RECOMMENDATIONS:   Plan for treatment: therapy treatment to continue next visit.  Planned interventions for next visit: continue with current treatment.  DC with indep next visit

## 2024-11-21 RX ORDER — TADALAFIL 10 MG/1
10 TABLET ORAL
Qty: 10 TABLET | Refills: 3 | Status: SHIPPED | OUTPATIENT
Start: 2024-11-21

## 2024-12-02 ENCOUNTER — HOSPITAL ENCOUNTER (OUTPATIENT)
Dept: RADIOLOGY | Facility: MEDICAL CENTER | Age: 49
End: 2024-12-02
Attending: UROLOGY
Payer: COMMERCIAL

## 2024-12-02 ENCOUNTER — APPOINTMENT (OUTPATIENT)
Dept: PHYSICAL MEDICINE AND REHAB | Facility: MEDICAL CENTER | Age: 49
End: 2024-12-02
Payer: COMMERCIAL

## 2024-12-02 VITALS
TEMPERATURE: 97.4 F | HEART RATE: 61 BPM | SYSTOLIC BLOOD PRESSURE: 118 MMHG | WEIGHT: 168.6 LBS | OXYGEN SATURATION: 95 % | BODY MASS INDEX: 24.14 KG/M2 | DIASTOLIC BLOOD PRESSURE: 67 MMHG | HEIGHT: 70 IN

## 2024-12-02 DIAGNOSIS — G89.29 CHRONIC HIP PAIN, BILATERAL: ICD-10-CM

## 2024-12-02 DIAGNOSIS — M54.2 CHRONIC NECK PAIN: ICD-10-CM

## 2024-12-02 DIAGNOSIS — M25.859 HIP IMPINGEMENT SYNDROME, UNSPECIFIED LATERALITY: ICD-10-CM

## 2024-12-02 DIAGNOSIS — G89.29 CHRONIC SHOULDER PAIN, UNSPECIFIED LATERALITY: ICD-10-CM

## 2024-12-02 DIAGNOSIS — M25.519 CHRONIC SHOULDER PAIN, UNSPECIFIED LATERALITY: ICD-10-CM

## 2024-12-02 DIAGNOSIS — M54.41 CHRONIC RIGHT-SIDED LOW BACK PAIN WITH RIGHT-SIDED SCIATICA: ICD-10-CM

## 2024-12-02 DIAGNOSIS — M25.551 CHRONIC HIP PAIN, BILATERAL: ICD-10-CM

## 2024-12-02 DIAGNOSIS — M54.51 VERTEBROGENIC LOW BACK PAIN: ICD-10-CM

## 2024-12-02 DIAGNOSIS — M25.552 CHRONIC HIP PAIN, BILATERAL: ICD-10-CM

## 2024-12-02 DIAGNOSIS — G89.29 CHRONIC NECK PAIN: ICD-10-CM

## 2024-12-02 DIAGNOSIS — G89.29 CHRONIC RIGHT-SIDED LOW BACK PAIN WITH RIGHT-SIDED SCIATICA: ICD-10-CM

## 2024-12-02 DIAGNOSIS — M47.816 LUMBAR SPONDYLOSIS: ICD-10-CM

## 2024-12-02 DIAGNOSIS — M51.26 LUMBAR DISC HERNIATION: ICD-10-CM

## 2024-12-02 DIAGNOSIS — M54.16 LUMBAR RADICULOPATHY: ICD-10-CM

## 2024-12-02 DIAGNOSIS — D29.30: ICD-10-CM

## 2024-12-02 PROCEDURE — 76870 US EXAM SCROTUM: CPT

## 2024-12-02 PROCEDURE — 99214 OFFICE O/P EST MOD 30 MIN: CPT | Performed by: PHYSICAL MEDICINE & REHABILITATION

## 2024-12-02 PROCEDURE — 1125F AMNT PAIN NOTED PAIN PRSNT: CPT | Performed by: PHYSICAL MEDICINE & REHABILITATION

## 2024-12-02 PROCEDURE — 3074F SYST BP LT 130 MM HG: CPT | Performed by: PHYSICAL MEDICINE & REHABILITATION

## 2024-12-02 PROCEDURE — 3078F DIAST BP <80 MM HG: CPT | Performed by: PHYSICAL MEDICINE & REHABILITATION

## 2024-12-02 ASSESSMENT — PATIENT HEALTH QUESTIONNAIRE - PHQ9: CLINICAL INTERPRETATION OF PHQ2 SCORE: 0

## 2024-12-02 ASSESSMENT — PAIN SCALES - GENERAL: PAINLEVEL_OUTOF10: 4=SLIGHT-MODERATE PAIN

## 2024-12-02 ASSESSMENT — FIBROSIS 4 INDEX: FIB4 SCORE: 1.19

## 2024-12-02 NOTE — Clinical Note
Star Alonso Fish is doing very well.  He agreed to take a break from physical therapy and we discussed his home exercise program.  I will follow-up with him again in the spring 2025  Tarik Whitlock MD

## 2024-12-02 NOTE — PROGRESS NOTES
Follow up patient note  Interventional spine and Pain  Physiatry (physical medicine and  Rehabilitation)       Chief complaint:   Chief Complaint   Patient presents with    New Patient     Back pain          HISTORY    Please see new patient note by Dr Whitlock,  for more details.     HPI  Patient identification: Fish Sandoval ,  1975,   With Diagnoses of Lumbar spondylosis, Lumbar radiculopathy, Chronic right-sided low back pain with right-sided sciatica, small Lumbar disc herniation L5-S1 with annular tear, Vertebrogenic low back pain, Hip impingement syndrome, Chronic hip pain, bilateral, Chronic shoulder pain, and Chronic neck pain were pertinent to this visit.     History of Present Illness  The patient, a 49-year-old male, presents for a follow-up evaluation.    He reports a significant improvement in his condition since the last visit, estimating a 75% improvement from his initial presentation. He has been managing his condition independently for the past 1 to 2 months, as recommended by his physical therapist Gavin and he has been doing his physical therapy driven home exercise program.      Despite the noted progress, he continues to experience discomfort when sitting for prolonged periods or reclining. He describes a sensation of compression and tightness in the lower back, resulting in moderate pain. Additionally, he reports muscle tightness in the upper back without associated pain. The patient also experiences pain localized to the right hip bone, excluding the anterior aspect. His overall flexibility is reported as satisfactory.    For comfort, he utilizes an adjustable mattress. His exercise regimen remains consistent, with no reported exacerbation of back or hip symptoms. He continues to engage in high-intensity workouts, including pull-ups and dips, and wears basketball high tops for additional support during these activities. He does not perform side planks but incorporates single-leg  exercises into his routine twice weekly.         ROS Red Flags :   Fever, Chills, Sweats: Denies  Involuntary Weight Loss: Denies  Bowel/Bladder Incontinence: Denies  Saddle Anesthesia: Denies        PMHx:   Past Medical History:   Diagnosis Date    Allergy        PSHx:   Past Surgical History:   Procedure Laterality Date    APPENDECTOMY         Family history   Family History   Problem Relation Age of Onset    Cancer Other          Medications:   Outpatient Medications Marked as Taking for the 12/2/24 encounter (Office Visit) with Tarik Whitlock M.D.   Medication Sig Dispense Refill    TURMERIC PO Take 1 Cap by mouth.      vitamin D, cholecalciferol, 400 units Tab tablet Take 400 Units by mouth.      Cyanocobalamin (VITAMIN B-12) 1000 MCG Tab Take 1,000 mcg by mouth.      Multiple Vitamin (MULTI-VITAMINS) Tab Take 1 Tab by mouth.          Current Outpatient Medications on File Prior to Visit   Medication Sig Dispense Refill    tadalafil (CIALIS) 10 MG tablet Take 1 Tablet by mouth 1 time a day as needed for Erectile Dysfunction. 10 Tablet 3    TURMERIC PO Take 1 Cap by mouth.      vitamin D, cholecalciferol, 400 units Tab tablet Take 400 Units by mouth.      Cyanocobalamin (VITAMIN B-12) 1000 MCG Tab Take 1,000 mcg by mouth.      Multiple Vitamin (MULTI-VITAMINS) Tab Take 1 Tab by mouth.      mometasone (ELOCON) 0.1 % Cream Apply thin layer to knees and elbows at bedtime 15 g 1    NON SPECIFIED C-Verapamil 15% Perme8, apply topically to effected area daily as prescribed.  Disp 30g, 3 refills 30 Each 3    mupirocin (BACTROBAN) 2 % Ointment Apply 1 Application to affected area(s) 2 times a day. 1 Tube 0     No current facility-administered medications on file prior to visit.         Allergies:   Allergies   Allergen Reactions    Pcn [Penicillins]        Social Hx:   Social History     Socioeconomic History    Marital status: Single     Spouse name: Not on file    Number of children: Not on file    Years of  education: Not on file    Highest education level: Professional school degree (e.g., MD, DDS, DVM, BURAK)   Occupational History    Not on file   Tobacco Use    Smoking status: Never    Smokeless tobacco: Never   Vaping Use    Vaping status: Never Used   Substance and Sexual Activity    Alcohol use: No    Drug use: Never    Sexual activity: Not on file   Other Topics Concern    Not on file   Social History Narrative    Not on file     Social Drivers of Health     Financial Resource Strain: Low Risk  (8/26/2023)    Overall Financial Resource Strain (CARDIA)     Difficulty of Paying Living Expenses: Not very hard   Food Insecurity: No Food Insecurity (8/26/2023)    Hunger Vital Sign     Worried About Running Out of Food in the Last Year: Never true     Ran Out of Food in the Last Year: Never true   Transportation Needs: No Transportation Needs (8/26/2023)    PRAPARE - Transportation     Lack of Transportation (Medical): No     Lack of Transportation (Non-Medical): No   Physical Activity: Sufficiently Active (8/26/2023)    Exercise Vital Sign     Days of Exercise per Week: 6 days     Minutes of Exercise per Session: 90 min   Stress: Stress Concern Present (8/26/2023)    [employer] - Occupational Stress Questionnaire     Feeling of Stress : To some extent   Social Connections: Socially Isolated (8/26/2023)    Social Connection and Isolation Panel [NHANES]     Frequency of Communication with Friends and Family: Once a week     Frequency of Social Gatherings with Friends and Family: Once a week     Attends Yazdanism Services: Never     Active Member of Clubs or Organizations: No     Attends Club or Organization Meetings: Never     Marital Status: Never    Intimate Partner Violence: Not on file   Housing Stability: Low Risk  (8/26/2023)    Housing Stability Vital Sign     Unable to Pay for Housing in the Last Year: No     Number of Places Lived in the Last Year: 1     Unstable Housing in  "the Last Year: No         EXAMINATION     Physical Exam:   Vitals: /67 (BP Location: Right arm, Patient Position: Sitting, BP Cuff Size: Adult)   Pulse 61   Temp 36.3 °C (97.4 °F) (Temporal)   Ht 1.778 m (5' 10\")   Wt 76.5 kg (168 lb 9.6 oz)   SpO2 95%     Constitutional:   Body Habitus: Body mass index is 24.19 kg/m².  Cooperation: Fully cooperates with exam  Appearance: Well-groomed no disheveled    Respiratory-  breathing comfortable on room air, no audible wheezing  Cardiovascular- capillary refills less than 2 seconds. No lower extremity edema is noted.   Psychiatric- alert and oriented ×3. Normal affect.    MSK and Neuro: -  Strength is 5 out of 5 in the bilateral upper and  lower extremities.  FAIRs maneuver and Corina's maneuver are negative bilaterally.      MEDICAL DECISION MAKING    DATA    Labs:   Lab Results   Component Value Date/Time    SODIUM 139 08/21/2024 11:15 AM    POTASSIUM 4.2 08/21/2024 11:15 AM    CHLORIDE 105 08/21/2024 11:15 AM    CO2 25 08/21/2024 11:15 AM    GLUCOSE 95 08/21/2024 11:15 AM    BUN 24 (H) 08/21/2024 11:15 AM    CREATININE 0.93 08/21/2024 11:15 AM    CREATININE 0.91 08/12/2011 12:00 AM    BUNCREATRAT 19 08/12/2011 12:00 AM    GLOMRATE >59 07/23/2010 08:36 AM        No results found for: \"PROTHROMBTM\", \"INR\"     Lab Results   Component Value Date/Time    WBC 4.4 (L) 10/28/2024 12:10 PM    WBC 5.6 08/12/2011 12:00 AM    RBC 4.89 10/28/2024 12:10 PM    RBC 4.86 08/12/2011 12:00 AM    HEMOGLOBIN 16.0 10/28/2024 12:10 PM    HEMATOCRIT 46.5 10/28/2024 12:10 PM    MCV 95.1 10/28/2024 12:10 PM    MCV 97 08/12/2011 12:00 AM    MCH 32.7 10/28/2024 12:10 PM    MCH 32.3 08/12/2011 12:00 AM    MCHC 34.4 10/28/2024 12:10 PM    MPV 10.9 10/28/2024 12:10 PM    NEUTSPOLYS 54.70 10/28/2024 12:10 PM    LYMPHOCYTES 32.30 10/28/2024 12:10 PM    MONOCYTES 8.70 10/28/2024 12:10 PM    EOSINOPHILS 3.20 10/28/2024 12:10 PM    BASOPHILS 0.90 10/28/2024 12:10 PM        Lab Results   Component " Value Date/Time    HBA1C 5.4 08/21/2024 11:15 AM          Imaging:   I personally reviewed following images    MRI lumbar spine 11/13/2023  Degenerative disc disease worst at L5-S1.  High intensity zone consistent with annular tear at L5-S1.  Facet arthropathy worst bilaterally at L5-S1 and also present at L4-5.  Mild degenerative disc disease at L4-5.    X-ray hips 11/6/2023  Morphology of the hips that can be seen with hip impingement syndrome.      I reviewed the following radiology reports                         Results for orders placed during the hospital encounter of 11/13/23    MR-LUMBAR SPINE-W/O    Impression  1.  Mild degenerative disease in the lumbar spine as described above. There is no significant spinal or neural foraminal stenosis.  2.  There is an approximately 1.2 cm sized T1 isointense and T2 hypointense lesion in the L5 vertebral body likely representing an atypical hemangioma.        Results for orders placed during the hospital encounter of 11/13/23    MR-LUMBAR SPINE-W/O    Impression  1.  Mild degenerative disease in the lumbar spine as described above. There is no significant spinal or neural foraminal stenosis.  2.  There is an approximately 1.2 cm sized T1 isointense and T2 hypointense lesion in the L5 vertebral body likely representing an atypical hemangioma.                                                                                                                Results for orders placed during the hospital encounter of 11/06/23    DX-LUMBAR SPINE-2 OR 3 VIEWS    Impression  No significant spondylosis. No acute fracture or listhesis.                         DIAGNOSIS   Visit Diagnoses     ICD-10-CM   1. Lumbar spondylosis  M47.816   2. Lumbar radiculopathy  M54.16   3. Chronic right-sided low back pain with right-sided sciatica  M54.41    G89.29   4. small Lumbar disc herniation L5-S1 with annular tear  M51.26   5. Vertebrogenic low back pain  M54.51   6. Hip impingement syndrome   M25.859   7. Chronic hip pain, bilateral  M25.551    M25.552    G89.29   8. Chronic shoulder pain  M25.519    G89.29   9. Chronic neck pain  M54.2    G89.29         ASSESSMENT and PLAN:     Fish Hebert Lori  1975 male      Fish was seen today for new patient.    Diagnoses and all orders for this visit:    Lumbar spondylosis    Lumbar radiculopathy    Chronic right-sided low back pain with right-sided sciatica    small Lumbar disc herniation L5-S1 with annular tear    Vertebrogenic low back pain    Hip impingement syndrome    Chronic hip pain, bilateral    Chronic shoulder pain    Chronic neck pain      Assessment & Plan  The hip impingement is not currently a cause for concern. Movement and strength are commendable, indicating a positive prognosis.  He does have axial back pain which is worse with sitting.  He has been doing his provider driven and physical therapy driven home exercise program.  Neck pain is stable.  He was advised to monitor the quality of his shoes and consider replacing them if the midsole shows signs of wear. The use of lifting shoes was recommended. He was also encouraged to incorporate side planks and single leg exercises into his routine. A temporary break from physical therapy was suggested. Should he experience increased pain in the front of the hips and groin, a closer examination of the hips will be necessary.  Overall the patient is significantly improved and is stable of the above conditions.          Follow up: 3/10/2025     Thank you for allowing me to participate in the care of this patient. If you have any questions please not hesitate to contact me.             Please note that this dictation was created using voice recognition software. I have made every reasonable attempt to correct obvious errors but there may be errors of grammar and content that I may have overlooked prior to finalization of this note.      Tarik Whitlock MD  Interventional Spine and Sports  Physiatry  Physical Medicine and Rehabilitation  [facility]

## 2024-12-04 ENCOUNTER — OFFICE VISIT (OUTPATIENT)
Dept: DERMATOLOGY | Facility: IMAGING CENTER | Age: 49
End: 2024-12-04
Payer: COMMERCIAL

## 2024-12-04 DIAGNOSIS — R23.3 SKIN HEMORRHAGE: ICD-10-CM

## 2024-12-04 DIAGNOSIS — L30.9 ECZEMA, UNSPECIFIED TYPE: ICD-10-CM

## 2024-12-04 DIAGNOSIS — L84 CALLUS: ICD-10-CM

## 2024-12-04 DIAGNOSIS — D23.9 BLUE NEVUS: ICD-10-CM

## 2024-12-04 DIAGNOSIS — L82.1 SEBORRHEIC KERATOSES: ICD-10-CM

## 2024-12-04 PROCEDURE — 99203 OFFICE O/P NEW LOW 30 MIN: CPT | Performed by: STUDENT IN AN ORGANIZED HEALTH CARE EDUCATION/TRAINING PROGRAM

## 2024-12-04 RX ORDER — CLOBETASOL PROPIONATE 0.05 MG/G
GEL TOPICAL
COMMUNITY

## 2024-12-04 NOTE — PROGRESS NOTES
[facility] Note    Chief Complaint   Patient presents with    Establish Care    Rash     X2mo started on elbow, itchy, spread to knees       HPI:      Here for evaluation of new rash for last 2 months. Affected elbows and knees.     Evaluated per PCP, concerned for possible DF, ordered t-TG iGA and IgG wheich were negative. Prior treatments include topical triamcinolone 0.1% ointment, mometasone 0.1% cream. He recently saw a dermatologist who thought it looked like psoriasis and prescribed topical clobetasol gel. He notes this caused the rash to clear quickly. Recently had small flare with burning patch on right knee.     Also notes moles on cheeks wanted to have checked.     Notes red spot on left hand recently apperaed a few weeks ago, no known trauma.     Asks about treatments for callus on hands 2/2 working out.     Lastly has mole on left hand wanted to have checked.       ROS: no fevers/chills. Other pertinent positives and negatives as above.     Meds/PMH/PSH/FamHx/Allergies: reviewed relevant to my specialty in the chart.          PHYSICAL EXAM, ASSESSMENT, & PLAN (per problem):   A focused skin exam was performed including the affected areas of the head (including face), neck, bilateral upper extremities, and bilateral lower extremities. Notable findings on exam today listed below and/or in assessment/plan..       Nummular Dermatitis - ddx may include psoriasis  Exam: nummular pink scaly papules coalescing into plaques on right medial knee  Recommend regular use of gentle emollients daily to twice daily to affected areas of skin. Examples include: Vaseline, Cerave, Cetaphil, Eucerin, Aveeno, Dove.   Recommend gentle soaps for sensitive skin (fragrance free), lukewarm water. Avoid prolonged exposure to hot water.  Avoid fragrance in products as possible, including detergent  continue clobetasol 0.05% ointment to affected area of the body twice daily until improved.   Side effects of topical  steroids discussed, including skin thinning, appearance of stretch marks (striae), easy bruising, superficial blood vessel formation (telangiectasias). Avoid face, groin, axillae.   Handouts provided       Blue nevus  Exam: Blue macule on left dorsal hand    - benign appearing, reassurance  - call if changing, ABCDES of melanoma discussed     Seborrheic keratosis   Exam: scattered tan to brown flat topped papules on cheeks    benign, reassurance     Callus  Exam: hyperkeratotic plaques over palmar MCP skin     Recommend Amlactin or urea lotion     Punctate hemorrhage   Exam: red subcutaneous macule on distal volar fingertip     Benign, reassurance, related to trauma likely   Call if does not self resolve     Follow up: as needed       Tereza Dent MD   [facility]

## 2025-01-06 ENCOUNTER — TELEPHONE (OUTPATIENT)
Dept: INTERNAL MEDICINE | Facility: IMAGING CENTER | Age: 50
End: 2025-01-06

## 2025-01-07 ENCOUNTER — NON-PROVIDER VISIT (OUTPATIENT)
Dept: INTERNAL MEDICINE | Facility: IMAGING CENTER | Age: 50
End: 2025-01-07
Payer: COMMERCIAL

## 2025-01-07 DIAGNOSIS — J06.9 UPPER RESPIRATORY TRACT INFECTION, UNSPECIFIED TYPE: ICD-10-CM

## 2025-01-07 LAB
FLUAV RNA SPEC QL NAA+PROBE: NEGATIVE
FLUBV RNA SPEC QL NAA+PROBE: NEGATIVE
RSV RNA SPEC QL NAA+PROBE: NEGATIVE
SARS-COV-2 RNA RESP QL NAA+PROBE: NEGATIVE

## 2025-01-07 PROCEDURE — 0241U POCT CEPHEID COV-2, FLU A/B, RSV - PCR: CPT | Performed by: INTERNAL MEDICINE

## 2025-01-07 NOTE — TELEPHONE ENCOUNTER
9 day history of fatigue, sore throat, sinus congestion.  No fever.  Home COVID x 2 Negative.  Recommend swab for RSV, Flu and COVID here at office.

## 2025-01-08 ENCOUNTER — OFFICE VISIT (OUTPATIENT)
Dept: INTERNAL MEDICINE | Facility: IMAGING CENTER | Age: 50
End: 2025-01-08
Payer: COMMERCIAL

## 2025-01-08 VITALS
RESPIRATION RATE: 14 BRPM | HEART RATE: 62 BPM | BODY MASS INDEX: 23.96 KG/M2 | WEIGHT: 167 LBS | TEMPERATURE: 97.8 F | SYSTOLIC BLOOD PRESSURE: 100 MMHG | OXYGEN SATURATION: 96 % | DIASTOLIC BLOOD PRESSURE: 60 MMHG

## 2025-01-08 DIAGNOSIS — M54.50 CHRONIC MIDLINE LOW BACK PAIN WITHOUT SCIATICA: ICD-10-CM

## 2025-01-08 DIAGNOSIS — R05.1 ACUTE COUGH: ICD-10-CM

## 2025-01-08 DIAGNOSIS — G89.29 CHRONIC MIDLINE LOW BACK PAIN WITHOUT SCIATICA: ICD-10-CM

## 2025-01-08 PROCEDURE — 3078F DIAST BP <80 MM HG: CPT | Performed by: INTERNAL MEDICINE

## 2025-01-08 PROCEDURE — 99213 OFFICE O/P EST LOW 20 MIN: CPT | Performed by: INTERNAL MEDICINE

## 2025-01-08 PROCEDURE — 3074F SYST BP LT 130 MM HG: CPT | Performed by: INTERNAL MEDICINE

## 2025-01-08 RX ORDER — AZITHROMYCIN 250 MG/1
TABLET, FILM COATED ORAL
Qty: 6 TABLET | Refills: 0 | Status: SHIPPED | OUTPATIENT
Start: 2025-01-08

## 2025-01-08 ASSESSMENT — FIBROSIS 4 INDEX: FIB4 SCORE: 1.19

## 2025-01-08 NOTE — PROGRESS NOTES
Chief Complaint   Patient presents with    URI       HISTORY OF THE PRESENT ILLNESS: Patient is a 49 y.o. male.     History of Present Illness  The patient presents for evaluation of a suspected RSV infection.    He has been in a relationship for approximately 10 weeks with a partner who recently fell ill. He suspects he contracted the illness from her, as he has been experiencing symptoms for the past 11 days. His symptoms have been intermittent, prompting him to seek medical attention on Monday. He underwent swab tests yesterday, suspecting an RSV infection. His mother also fell ill, but she has not yet been tested. His symptoms include nasal discharge, which initially was white but has since turned yellow with occasional red blotches. He experiences pain in his right ear when blowing his nose too hard. Over the past 3 to 4 days, he has developed a dry cough, which is not constant and appears to be improving daily. He reports no wheezing or shortness of breath. He continues to exercise and reports no fever, diarrhea, or stomach upset.    Supplemental Information  He is doing physical therapy with a pelvic  at [facility] and has noticed some improvement in his back condition.        Allergies: Pcn [penicillins]    Current Outpatient Medications Ordered in Epic   Medication Sig Dispense Refill    Clobetasol Propionate 0.05 % Gel Apply  topically.      tadalafil (CIALIS) 10 MG tablet Take 1 Tablet by mouth 1 time a day as needed for Erectile Dysfunction. 10 Tablet 3    mometasone (ELOCON) 0.1 % Cream Apply thin layer to knees and elbows at bedtime 15 g 1    NON SPECIFIED C-Verapamil 15% Perme8, apply topically to effected area daily as prescribed.  Disp 30g, 3 refills 30 Each 3    TURMERIC PO Take 1 Cap by mouth.      vitamin D, cholecalciferol, 400 units Tab tablet Take 400 Units by mouth.      Cyanocobalamin (VITAMIN B-12) 1000 MCG Tab Take 1,000 mcg by mouth.      Multiple Vitamin  (MULTI-VITAMINS) Tab Take 1 Tab by mouth.      mupirocin (BACTROBAN) 2 % Ointment Apply 1 Application to affected area(s) 2 times a day. 1 Tube 0     No current Epic-ordered facility-administered medications on file.       Past medical history, social history and family history were reviewed from chart today    Review of systems: Per HPI.    All others negative.     Exam: /60 (BP Location: Left arm, Patient Position: Sitting, BP Cuff Size: Adult)   Pulse 62   Temp 36.6 °C (97.8 °F) (Temporal)   Resp 14   Wt 75.8 kg (167 lb)   SpO2 96%   General: Well-appearing. Well-developed. No signs of distress.  HEENT: Grossly normal. Oral cavity is pink and moist.   Neck: Supple without JVD or bruit.  Pulmonary: Clear with good breath sounds. Normal effort.  Cardiovascular: Regular. Carotid and radial pulses are intact.  Abdomen: Soft, nontender, nondistended. Spleen and liver are not enlarged.  Neurologic: Cranial nerves II through XII are grossly normal, alert and oriented x3      Diagnosis:  1. Acute cough        2. Chronic midline low back pain without sciatica          Assessment & Plan  1. Suspected respiratory syncytial virus (RSV) infection.  Symptoms suggest a potential RSV infection, despite the negative test result. The cough may persist for 2 to 4 weeks. He is advised to continue exercising as long as he feels well and to monitor his body's response. His mother should undergo testing to help confirm the diagnosis. If the mother's test is negative, indicating a possible bacterial infection, an antibiotic regimen will be initiated.    Continue physical therapy for back pain and pelvic floor dysfunction    My total time spent caring for the patient on the day of the encounter was  greater than 20 minutes.   This includes obtaining history, reviewing chart, physical exam, patient education, reviewing outside records, placing orders, interpreting tests and coordinating care.    Portions of this note were  completed using voice recognition software (Dragon Naturally speaking software) . Occasional transcription errors may have escaped proof reading. I have made every reasonable attempt to correct obvious errors, but I expect that there are errors of grammar and possibly content that I did not discover before finalizing the note.

## 2025-02-24 ENCOUNTER — OFFICE VISIT (OUTPATIENT)
Dept: PHYSICAL MEDICINE AND REHAB | Facility: MEDICAL CENTER | Age: 50
End: 2025-02-24
Payer: COMMERCIAL

## 2025-02-24 VITALS
HEART RATE: 63 BPM | TEMPERATURE: 97.1 F | BODY MASS INDEX: 24.11 KG/M2 | SYSTOLIC BLOOD PRESSURE: 115 MMHG | OXYGEN SATURATION: 96 % | DIASTOLIC BLOOD PRESSURE: 95 MMHG | HEIGHT: 70 IN | WEIGHT: 168.4 LBS

## 2025-02-24 DIAGNOSIS — S29.011A STRAIN OF RIGHT PECTORALIS MUSCLE, INITIAL ENCOUNTER: ICD-10-CM

## 2025-02-24 DIAGNOSIS — M47.816 LUMBAR SPONDYLOSIS: ICD-10-CM

## 2025-02-24 DIAGNOSIS — M25.519 CHRONIC SHOULDER PAIN, UNSPECIFIED LATERALITY: ICD-10-CM

## 2025-02-24 DIAGNOSIS — M25.552 CHRONIC HIP PAIN, BILATERAL: ICD-10-CM

## 2025-02-24 DIAGNOSIS — G89.29 CHRONIC SHOULDER PAIN, UNSPECIFIED LATERALITY: ICD-10-CM

## 2025-02-24 DIAGNOSIS — M54.16 LUMBAR RADICULOPATHY: ICD-10-CM

## 2025-02-24 DIAGNOSIS — G89.29 CHRONIC HIP PAIN, BILATERAL: ICD-10-CM

## 2025-02-24 DIAGNOSIS — M25.551 CHRONIC HIP PAIN, BILATERAL: ICD-10-CM

## 2025-02-24 PROCEDURE — 99214 OFFICE O/P EST MOD 30 MIN: CPT | Performed by: PHYSICAL MEDICINE & REHABILITATION

## 2025-02-24 PROCEDURE — 1125F AMNT PAIN NOTED PAIN PRSNT: CPT | Performed by: PHYSICAL MEDICINE & REHABILITATION

## 2025-02-24 PROCEDURE — 3080F DIAST BP >= 90 MM HG: CPT | Performed by: PHYSICAL MEDICINE & REHABILITATION

## 2025-02-24 PROCEDURE — 3074F SYST BP LT 130 MM HG: CPT | Performed by: PHYSICAL MEDICINE & REHABILITATION

## 2025-02-24 ASSESSMENT — PAIN SCALES - GENERAL: PAINLEVEL_OUTOF10: 5=MODERATE PAIN

## 2025-02-24 ASSESSMENT — PATIENT HEALTH QUESTIONNAIRE - PHQ9
5. POOR APPETITE OR OVEREATING: 2 - MORE THAN HALF THE DAYS
SUM OF ALL RESPONSES TO PHQ QUESTIONS 1-9: 7
CLINICAL INTERPRETATION OF PHQ2 SCORE: 2

## 2025-02-24 ASSESSMENT — FIBROSIS 4 INDEX: FIB4 SCORE: 1.19

## 2025-02-24 NOTE — Clinical Note
Fish Burroughs has a new pectoralis strain. I am hoping that you could get him in soon to work with you. I put in a new order. Thanks.

## 2025-02-25 NOTE — PROGRESS NOTES
Follow up patient note  Interventional spine and Pain  Physiatry (physical medicine and  Rehabilitation)       Chief complaint:   Chief Complaint   Patient presents with    Follow-Up     Back pain          HISTORY    Please see new patient note by Dr Whitlock,  for more details.     HPI  Patient identification: Fish Sandoval ,  1975,   With Diagnoses of Strain of right pectoralis muscle, Lumbar spondylosis, Lumbar radiculopathy, Chronic shoulder pain, and Chronic hip pain, bilateral were pertinent to this visit.     History of Present Illness    From the previous visit  The patient, a 49-year-old male, presents for a follow-up evaluation.    He reports a significant improvement in his condition since the last visit, estimating a 75% improvement from his initial presentation. He has been managing his condition independently for the past 1 to 2 months, as recommended by his physical therapist Gavin and he has been doing his physical therapy driven home exercise program.      Despite the noted progress, he continues to experience discomfort when sitting for prolonged periods or reclining. He describes a sensation of compression and tightness in the lower back, resulting in moderate pain. Additionally, he reports muscle tightness in the upper back without associated pain. The patient also experiences pain localized to the right hip bone, excluding the anterior aspect. His overall flexibility is reported as satisfactory.    For comfort, he utilizes an adjustable mattress. His exercise regimen remains consistent, with no reported exacerbation of back or hip symptoms. He continues to engage in high-intensity workouts, including pull-ups and dips, and wears basketball high tops for additional support during these activities. He does not perform side planks but incorporates single-leg exercises into his routine twice weekly.    From this visit  The patient, a 49-year-old male, presents for a follow-up  visit.    He continues to experience lumbar pain, which he rates as 3 out of 10 in intensity. He reports that his back condition remains stable, with no exacerbation of symptoms.    The patient reports a worsening of shoulder pain, particularly in the right pectoral and triceps regions, with moderate to severe intensity. He has been attending occupational therapy and physical therapy. He describes a new onset of pain localized to the axillary region, slightly beneath the pectoral muscles, but not extending to the triceps. This discomfort has been present for 5 weeks and is particularly noticeable during chest press exercises, preventing him from performing flies and weighted pull-ups. Despite the pain, he continues to engage in weightlifting. He does not report any acute injury, popping sensation, or muscle deformity. Additionally, he experiences left shoulder pain, which he attributes to chronic lifting. He has attempted to increase the depth of his chest press, resulting in left shoulder aches. He exclusively uses dumbbells for his chest press and avoids inclines due to the associated shoulder strain. He has not incorporated rotator cuff exercises into his routine for several years.    The patient has a history of pes planus and was previously advised to use orthotics, although he admits to inconsistent usage. He is seeking recommendations for alternative orthotic providers.         ROS Red Flags :   Fever, Chills, Sweats: Denies  Involuntary Weight Loss: Denies  Bowel/Bladder Incontinence: Denies  Saddle Anesthesia: Denies        PMHx:   Past Medical History:   Diagnosis Date    Allergy        PSHx:   Past Surgical History:   Procedure Laterality Date    APPENDECTOMY         Family history   Family History   Problem Relation Age of Onset    Cancer Other          Medications:   Outpatient Medications Marked as Taking for the 2/24/25 encounter (Office Visit) with Tarik Whitlock M.D.   Medication Sig Dispense  Refill    NON SPECIFIED C-Verapamil 15% Perme8, apply topically to effected area daily as prescribed.  Disp 30g, 3 refills 30 Each 3    azithromycin (ZITHROMAX) 250 MG Tab Take 2 tabs on day one and 1 tabs on days 2-5 6 Tablet 0    Clobetasol Propionate 0.05 % Gel Apply  topically.      TURMERIC PO Take 1 Cap by mouth.      vitamin D, cholecalciferol, 400 units Tab tablet Take 400 Units by mouth.      Multiple Vitamin (MULTI-VITAMINS) Tab Take 1 Tab by mouth.          Current Outpatient Medications on File Prior to Visit   Medication Sig Dispense Refill    NON SPECIFIED C-Verapamil 15% Perme8, apply topically to effected area daily as prescribed.  Disp 30g, 3 refills 30 Each 3    azithromycin (ZITHROMAX) 250 MG Tab Take 2 tabs on day one and 1 tabs on days 2-5 6 Tablet 0    Clobetasol Propionate 0.05 % Gel Apply  topically.      TURMERIC PO Take 1 Cap by mouth.      vitamin D, cholecalciferol, 400 units Tab tablet Take 400 Units by mouth.      Multiple Vitamin (MULTI-VITAMINS) Tab Take 1 Tab by mouth.      tadalafil (CIALIS) 10 MG tablet Take 1 Tablet by mouth 1 time a day as needed for Erectile Dysfunction. 10 Tablet 3    mometasone (ELOCON) 0.1 % Cream Apply thin layer to knees and elbows at bedtime 15 g 1    Cyanocobalamin (VITAMIN B-12) 1000 MCG Tab Take 1,000 mcg by mouth.      mupirocin (BACTROBAN) 2 % Ointment Apply 1 Application to affected area(s) 2 times a day. 1 Tube 0     No current facility-administered medications on file prior to visit.         Allergies:   Allergies   Allergen Reactions    Pcn [Penicillins]        Social Hx:   Social History     Socioeconomic History    Marital status: Single     Spouse name: Not on file    Number of children: Not on file    Years of education: Not on file    Highest education level: Professional school degree (e.g., MD, DDS, DVM, BURAK)   Occupational History    Not on file   Tobacco Use    Smoking status: Never    Smokeless tobacco: Never   Vaping Use    Vaping  "status: Never Used   Substance and Sexual Activity    Alcohol use: No    Drug use: Never    Sexual activity: Not on file   Other Topics Concern    Not on file   Social History Narrative    Not on file     Social Drivers of Health     Financial Resource Strain: Low Risk  (8/26/2023)    Overall Financial Resource Strain (CARDIA)     Difficulty of Paying Living Expenses: Not very hard   Food Insecurity: No Food Insecurity (8/26/2023)    Hunger Vital Sign     Worried About Running Out of Food in the Last Year: Never true     Ran Out of Food in the Last Year: Never true   Transportation Needs: No Transportation Needs (8/26/2023)    PRAPARE - Transportation     Lack of Transportation (Medical): No     Lack of Transportation (Non-Medical): No   Physical Activity: Sufficiently Active (8/26/2023)    Exercise Vital Sign     Days of Exercise per Week: 6 days     Minutes of Exercise per Session: 90 min   Stress: Stress Concern Present (8/26/2023)    [employer] - Occupational Stress Questionnaire     Feeling of Stress : To some extent   Social Connections: Socially Isolated (8/26/2023)    Social Connection and Isolation Panel [NHANES]     Frequency of Communication with Friends and Family: Once a week     Frequency of Social Gatherings with Friends and Family: Once a week     Attends [employer] Services: Never     Active Member of Clubs or Organizations: No     Attends Club or Organization Meetings: Never     Marital Status: Never    Intimate Partner Violence: Not on file   Housing Stability: Low Risk  (8/26/2023)    Housing Stability Vital Sign     Unable to Pay for Housing in the Last Year: No     Number of Places Lived in the Last Year: 1     Unstable Housing in the Last Year: No         EXAMINATION     Physical Exam:   Vitals: BP (!) 115/95 (BP Location: Right arm, Patient Position: Sitting, BP Cuff Size: Adult)   Pulse 63   Temp 36.2 °C (97.1 °F) (Temporal)   Ht 1.778 m (5' 10\")   Wt " "76.4 kg (168 lb 6.4 oz)   SpO2 96%     Constitutional:   Body Habitus: Body mass index is 24.16 kg/m².  Cooperation: Fully cooperates with exam  Appearance: Well-groomed no disheveled    Respiratory-  breathing comfortable on room air, no audible wheezing  Cardiovascular- capillary refills less than 2 seconds. No lower extremity edema is noted.   Psychiatric- alert and oriented ×3. Normal affect.    MSK and Neuro: -    Physical Exam  There is mild positive tenderness to palpation of the myotendinous junction and at the insertion of the pectoralis minor and pectoralis major in the musculoskeletal system, which reproduces the patient's pain. No deformities of the pectoralis muscle are observed. Strength is 5 out of 5 throughout in the bilateral upper extremities.        MEDICAL DECISION MAKING    DATA    Labs:   Lab Results   Component Value Date/Time    SODIUM 139 08/21/2024 11:15 AM    POTASSIUM 4.2 08/21/2024 11:15 AM    CHLORIDE 105 08/21/2024 11:15 AM    CO2 25 08/21/2024 11:15 AM    GLUCOSE 95 08/21/2024 11:15 AM    BUN 24 (H) 08/21/2024 11:15 AM    CREATININE 0.93 08/21/2024 11:15 AM    CREATININE 0.91 08/12/2011 12:00 AM    BUNCREATRAT 19 08/12/2011 12:00 AM    GLOMRATE >59 07/23/2010 08:36 AM        No results found for: \"PROTHROMBTM\", \"INR\"     Lab Results   Component Value Date/Time    WBC 4.4 (L) 10/28/2024 12:10 PM    WBC 5.6 08/12/2011 12:00 AM    RBC 4.89 10/28/2024 12:10 PM    RBC 4.86 08/12/2011 12:00 AM    HEMOGLOBIN 16.0 10/28/2024 12:10 PM    HEMATOCRIT 46.5 10/28/2024 12:10 PM    MCV 95.1 10/28/2024 12:10 PM    MCV 97 08/12/2011 12:00 AM    MCH 32.7 10/28/2024 12:10 PM    MCH 32.3 08/12/2011 12:00 AM    MCHC 34.4 10/28/2024 12:10 PM    MPV 10.9 10/28/2024 12:10 PM    NEUTSPOLYS 54.70 10/28/2024 12:10 PM    LYMPHOCYTES 32.30 10/28/2024 12:10 PM    MONOCYTES 8.70 10/28/2024 12:10 PM    EOSINOPHILS 3.20 10/28/2024 12:10 PM    BASOPHILS 0.90 10/28/2024 12:10 PM        Lab Results   Component Value " Date/Time    HBA1C 5.4 2024 11:15 AM          Imaging:   I personally reviewed following images    MRI lumbar spine 2023  Degenerative disc disease worst at L5-S1.  High intensity zone consistent with annular tear at L5-S1.  Facet arthropathy worst bilaterally at L5-S1 and also present at L4-5.  Mild degenerative disc disease at L4-5.    X-ray hips 2023  Morphology of the hips that can be seen with hip impingement syndrome.      I reviewed the following radiology reports                         Results for orders placed during the hospital encounter of 23    MR-LUMBAR SPINE-W/O    Impression  1.  Mild degenerative disease in the lumbar spine as described above. There is no significant spinal or neural foraminal stenosis.  2.  There is an approximately 1.2 cm sized T1 isointense and T2 hypointense lesion in the L5 vertebral body likely representing an atypical hemangioma.        Results for orders placed during the hospital encounter of 23    MR-LUMBAR SPINE-W/O    Impression  1.  Mild degenerative disease in the lumbar spine as described above. There is no significant spinal or neural foraminal stenosis.  2.  There is an approximately 1.2 cm sized T1 isointense and T2 hypointense lesion in the L5 vertebral body likely representing an atypical hemangioma.                                                                                                                Results for orders placed during the hospital encounter of 23    DX-LUMBAR SPINE-2 OR 3 VIEWS    Impression  No significant spondylosis. No acute fracture or listhesis.                         DIAGNOSIS   Visit Diagnoses     ICD-10-CM   1. Strain of right pectoralis muscle  S29.011A   2. Lumbar spondylosis  M47.816   3. Lumbar radiculopathy  M54.16   4. Chronic shoulder pain  M25.519    G89.29   5. Chronic hip pain, bilateral  M25.551    M25.552    G89.29         ASSESSMENT and PLAN:     Fish Sandoval  1975  male      Fish was seen today for follow-up.    Diagnoses and all orders for this visit:    Strain of right pectoralis muscle  -     Referral to Physical Therapy  -     Referral to Chiropractic    Lumbar spondylosis  -     Referral to Physical Therapy  -     Referral to Chiropractic    Lumbar radiculopathy  -     Referral to Physical Therapy  -     Referral to Chiropractic    Chronic shoulder pain  -     Referral to Physical Therapy  -     Referral to Chiropractic    Chronic hip pain, bilateral  -     Referral to Physical Therapy  -     Referral to Chiropractic      Assessment & Plan  Pectoralis strain. The diagnosis is based on the patient's reported symptoms and physical examination findings, including tenderness at the myotendinous junction and the insertion of the pectoralis minor and major. There is no evidence of deformity, and muscle strength is normal. He is advised to rest the affected muscle and tendon, as healing can take a few months. Physical therapy will be utilized to load the tendon without causing pain. Massage guns and heating pads are recommended for muscle relaxation and warming up before exercise. Rotator cuff exercises with light weights and bands are suggested to warm up the shoulders before upper body workouts. No advanced imaging, injections, or procedures are deemed necessary at this time.    Low back pain.  The patient reports that his low back pain is stable and not worsening.    Flat feet.  He is advised to try over-the-counter orthotics such as Powersteps or Superfeet, which are available over the counter. These options are significantly cheaper than custom orthotics and can be tried.  If these are not effective for him then he can continue with custom orthotics    Medications: Acetaminophen up to 1000 mg 3 times daily as needed not to exceed 3000 mg per 24-hours.  Continue turmeric      Follow up: 6 months    Thank you for allowing me to participate in the care of this patient. If  you have any questions please not hesitate to contact me.             Please note that this dictation was created using voice recognition software. I have made every reasonable attempt to correct obvious errors but there may be errors of grammar and content that I may have overlooked prior to finalization of this note.      Tarik Whitlock MD  Interventional Spine and Sports Physiatry  Physical Medicine and Rehabilitation  [facility]

## 2025-03-04 ENCOUNTER — OFFICE VISIT (OUTPATIENT)
Dept: INTERNAL MEDICINE | Facility: IMAGING CENTER | Age: 50
End: 2025-03-04
Payer: COMMERCIAL

## 2025-03-04 VITALS
OXYGEN SATURATION: 94 % | DIASTOLIC BLOOD PRESSURE: 62 MMHG | SYSTOLIC BLOOD PRESSURE: 104 MMHG | RESPIRATION RATE: 12 BRPM | TEMPERATURE: 97.3 F | HEART RATE: 61 BPM

## 2025-03-04 DIAGNOSIS — H02.401 PTOSIS OF RIGHT EYELID: ICD-10-CM

## 2025-03-04 PROCEDURE — 3078F DIAST BP <80 MM HG: CPT | Performed by: INTERNAL MEDICINE

## 2025-03-04 PROCEDURE — 3074F SYST BP LT 130 MM HG: CPT | Performed by: INTERNAL MEDICINE

## 2025-03-04 PROCEDURE — 99213 OFFICE O/P EST LOW 20 MIN: CPT | Performed by: INTERNAL MEDICINE

## 2025-03-11 NOTE — PROGRESS NOTES
Chief Complaint   Patient presents with    Eye Problem     Right eyelid is lower than left       HISTORY OF THE PRESENT ILLNESS: Patient is a 49 y.o. male.     Patient comes in with complaint of right eyelid lower than left.  He noticed this over the last few weeks.  No obvious trauma.  No discharge.  No visual disturbance.    History of Present Illness  The patient presents for evaluation of right eye ptosis.    He observed a discrepancy in the opening of his eyes, with the right eye appearing less open than the left, approximately 1 month ago. This issue is more pronounced when he is not focusing or concentrating, during which the right eye tends to close partially. He has been monitoring this condition for the past month and reports no associated pain, discharge, redness, or irritation. He has been experiencing bilateral eye itchiness but reports no changes in vision. He has been squinting excessively, even while wearing corrective eyewear, and has been making conscious efforts to keep his eyes as open as possible. His most recent ophthalmological examination was conducted in 12/2024, during which he received a new prescription for contact lenses. He reports no history of insect stings or other potential causes of swelling. He has never received Botox injections. He occasionally uses Zyrtec eye drops for allergy management.    He has a new injury on the underside of his pectoral muscles. He saw Dr. Meneses, who sent him to physical therapy, but he has not started yet for that. He has been dealing with shoulder injuries as well. He has not noticed any other neurologic weakness or double vision.    He was seeing Rosa Mayfield, the physical therapist at [facility] for bladder and pelvic floor issues. She is moving to [place] in a couple of weeks, so he might message for a referral.    Supplemental Information  He has recovered from a respiratory infection.    MEDICATIONS  Current: Zyrtec        Allergies: Pcn  [penicillins]    Current Outpatient Medications Ordered in Epic   Medication Sig Dispense Refill    NON SPECIFIED C-Verapamil 15% Perme8, apply topically to effected area daily as prescribed.  Disp 30g, 3 refills 30 Each 3    azithromycin (ZITHROMAX) 250 MG Tab Take 2 tabs on day one and 1 tabs on days 2-5 6 Tablet 0    Clobetasol Propionate 0.05 % Gel Apply  topically.      tadalafil (CIALIS) 10 MG tablet Take 1 Tablet by mouth 1 time a day as needed for Erectile Dysfunction. 10 Tablet 3    mometasone (ELOCON) 0.1 % Cream Apply thin layer to knees and elbows at bedtime 15 g 1    TURMERIC PO Take 1 Cap by mouth.      vitamin D, cholecalciferol, 400 units Tab tablet Take 400 Units by mouth.      Cyanocobalamin (VITAMIN B-12) 1000 MCG Tab Take 1,000 mcg by mouth.      Multiple Vitamin (MULTI-VITAMINS) Tab Take 1 Tab by mouth.      mupirocin (BACTROBAN) 2 % Ointment Apply 1 Application to affected area(s) 2 times a day. 1 Tube 0     No current Epic-ordered facility-administered medications on file.       Past medical history, social history and family history were reviewed from chart today    Review of systems: Per HPI.    All others negative.     Exam: /62 (BP Location: Left arm, Patient Position: Sitting, BP Cuff Size: Adult)   Pulse 61   Temp 36.3 °C (97.3 °F) (Temporal)   Resp 12   SpO2 94%   General: Well-appearing.  No distress.  HEENT: Grossly normal.  Mild ptosis of the right eyelid compared to left.  PERRLA, EOMI.  Funduscopic is grossly normal.  Conjunctiva is normal.  Pulmonary: Clear with normal effort  Cardiovascular: Regular.   Abdomen: Soft, nontender, nondistended.   Neurologic: Grossly normal    Diagnosis:  1. Ptosis of right eyelid          Unclear cause for swelling and ptosis.  Trial antihistamine.  Recommend evaluation with ophthalmology.  If he does not improve and no other cause then recommend evaluation with neuro-ophthalmology and possibly Dr. Burch for correction    Assessment &  Plan  1. Right eye ptosis.  The patient presents with a slight ptosis in the right eye, which could potentially be attributed to an allergic reaction causing swelling. However, the possibility of a pinched nerve can not be ruled out. The absence of double vision suggests that myasthenia gravis is unlikely. He will commence a regimen of over-the-counter Zyrtec, to be taken nightly for a duration of 1 week. He has been advised to provide an update on his condition next week. Should there be no improvement in his symptoms, blood tests will be conducted to rule out myasthenia gravis or other underlying conditions. A referral to a neuro-ophthalmologist may also be considered. If necessary, a consultation with Dr. Burch, a specialist in lid lag issues, will be arranged.    2. Pectoral muscle injury.  He has a new injury under the pectoral muscles, initially thought to be in the armpit. He was previously seen by Dr. Meneses, who prescribed physical therapy. He has not started physical therapy yet but is advised to begin the sessions as prescribed.    3. Pelvic floor dysfunction.  He has been seeing a physical therapist, Rosa Mayfield, for pelvic floor issues. She is transitioning to a new facility. A referral for therapy at the new facility will be provided once she transitions.        My total time spent caring for the patient on the day of the encounter was  greater than 20 minutes.   This includes obtaining history, reviewing chart, physical exam, patient education, reviewing outside records, placing orders, interpreting tests and coordinating care.    Portions of this note were completed using voice recognition software (Dragon Naturally speaking software) . Occasional transcription errors may have escaped proof reading. I have made every reasonable attempt to correct obvious errors, but I expect that there are errors of grammar and possibly content that I did not discover before finalizing the note.

## 2025-03-13 DIAGNOSIS — M62.89 PELVIC FLOOR DYSFUNCTION: ICD-10-CM

## 2025-03-13 DIAGNOSIS — H02.401 PTOSIS OF RIGHT EYELID: ICD-10-CM

## 2025-04-05 DIAGNOSIS — M62.89 PELVIC FLOOR DYSFUNCTION: ICD-10-CM

## 2025-04-08 ENCOUNTER — OFFICE VISIT (OUTPATIENT)
Dept: OPHTHALMOLOGY | Facility: MEDICAL CENTER | Age: 50
End: 2025-04-08
Payer: COMMERCIAL

## 2025-04-08 ENCOUNTER — HOSPITAL ENCOUNTER (OUTPATIENT)
Dept: LAB | Facility: MEDICAL CENTER | Age: 50
End: 2025-04-08
Attending: STUDENT IN AN ORGANIZED HEALTH CARE EDUCATION/TRAINING PROGRAM
Payer: COMMERCIAL

## 2025-04-08 DIAGNOSIS — H02.401 PTOSIS OF RIGHT EYELID: ICD-10-CM

## 2025-04-08 DIAGNOSIS — H52.13 MYOPIA OF BOTH EYES: ICD-10-CM

## 2025-04-08 PROCEDURE — 36415 COLL VENOUS BLD VENIPUNCTURE: CPT

## 2025-04-08 PROCEDURE — 92250 FUNDUS PHOTOGRAPHY W/I&R: CPT | Performed by: STUDENT IN AN ORGANIZED HEALTH CARE EDUCATION/TRAINING PROGRAM

## 2025-04-08 PROCEDURE — 86042 ACETYLCHOLN RCPTR BLCKG ANTB: CPT

## 2025-04-08 PROCEDURE — 99203 OFFICE O/P NEW LOW 30 MIN: CPT | Mod: 25 | Performed by: STUDENT IN AN ORGANIZED HEALTH CARE EDUCATION/TRAINING PROGRAM

## 2025-04-08 PROCEDURE — 86041 ACETYLCHOLN RCPTR BNDNG ANTB: CPT

## 2025-04-08 PROCEDURE — 86255 FLUORESCENT ANTIBODY SCREEN: CPT

## 2025-04-08 ASSESSMENT — MARGIN REFLEX DISTANCE
OD_MRD2: 5
OS_MRD1: 5
OD_MRD1: 1
OS_MRD2: 5

## 2025-04-08 ASSESSMENT — EXTERNAL EXAM - LEFT EYE: OS_EXAM: NORMAL

## 2025-04-08 ASSESSMENT — SLIT LAMP EXAM - LIDS
COMMENTS: NORMAL
COMMENTS: NORMAL

## 2025-04-08 ASSESSMENT — REFRACTION_WEARINGRX
OD_CYLINDER: +1.50
SPECS_TYPE: SVL
OS_CYLINDER: +1.50
OS_AXIS: 036
OS_SPHERE: -5.50
OD_AXIS: 148
OD_SPHERE: -5.75

## 2025-04-08 ASSESSMENT — TONOMETRY
IOP_METHOD: I-CARE
OD_IOP_MMHG: 13
OS_IOP_MMHG: 12

## 2025-04-08 ASSESSMENT — CONF VISUAL FIELD
OD_INFERIOR_NASAL_RESTRICTION: 0
OD_SUPERIOR_NASAL_RESTRICTION: 0
OD_SUPERIOR_TEMPORAL_RESTRICTION: 0
OS_INFERIOR_TEMPORAL_RESTRICTION: 0
OD_NORMAL: 1
OS_SUPERIOR_TEMPORAL_RESTRICTION: 0
OD_INFERIOR_TEMPORAL_RESTRICTION: 0
OS_SUPERIOR_NASAL_RESTRICTION: 0
OS_NORMAL: 1
OS_INFERIOR_NASAL_RESTRICTION: 0

## 2025-04-08 ASSESSMENT — REFRACTION_MANIFEST
OS_AXIS: 045
OD_CYLINDER: +1.00
OS_SPHERE: -5.00
METHOD_AUTOREFRACTION: 1
OS_CYLINDER: +1.25
OD_AXIS: 125
OD_SPHERE: -5.00

## 2025-04-08 ASSESSMENT — VISUAL ACUITY
OS_CC+: -2
OD_CC: 20/25
METHOD: SNELLEN - LINEAR
OS_SC: J2
OD_SC: J2
OS_SC: 20/150
OS_CC: 20/20
OD_SC: 20/200

## 2025-04-08 ASSESSMENT — CUP TO DISC RATIO
OD_RATIO: 0.2
OS_RATIO: 0.2

## 2025-04-08 ASSESSMENT — EXTERNAL EXAM - RIGHT EYE: OD_EXAM: NORMAL

## 2025-04-08 NOTE — ASSESSMENT & PLAN NOTE
48 yo healthy man presenting for evaluation of ptosis    Onset several years ago with gradual worsening. Denies any pain, double vision, generalized weakness, dysphagia. No worsening with fatigue.     Exam 4/8/25: no APD, VA 20/25 OD 20/20-2 OS, 9/9 color plates OU, CF full. EOM full, ortho alignment in all gazes. Optic nerves pink with sharp disc margins, tilted nerve OS. RNFL 86 OD 84 OS. +cogan twitch, enhanced ptosis.     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 Will obtain MG panel and anti striated antibodies. However given his lack of fluctuation with fatigue, will not plan for a mestinon trial if negative. If labs negative, suspect involutional ptosis and can refer to Dr Burch for blepharoplasty. Discussed eyelid tape or eyelid crutches for symptomatic relief as well. Pt is considering whether he wants to pursue blepharoplasty.  Given normal pupillary findings and efferent exam, no indication for further imaging at this time.     -MG panel, anti striated  -Consider referral to Dr Burch for blepharoplasty  -Discussed eye lid crutches and tape  -RTC prn, discussed signs and symptoms of concern

## 2025-04-08 NOTE — PROGRESS NOTES
Peds/Neuro Ophthalmology:   Garett Soriano M.D.    Date & Time note created:    4/8/2025   11:59 AM     Referring MD / APRN:  Hemanth Bland M.D., No att. providers found    Patient ID:  Name:             Fish Sandoval   YOB: 1975  Age:                 49 y.o.  male   MRN:               [id number]    Chief Complaint/Reason for Visit:     Other (New pt eval for ptosis OD)      History of Present Illness:      Fish Sandoval is a 50 yo man referred for evaluation of ptosis OD    Fish has noticed fluctuating ptosis OD for the past few years after he contracted Covid. he feels he has to work extra hard to keep that eye open. He has not noticed any worsening of the ptosis with fatigue or by the end of the day. He denies any double vision or vision loss, dysphagia, or generalized weakness. He has no history of autoimmune disease. He does notice floaters in his vision         Review of Systems:  ROS    Past Medical History:   Past Medical History:   Diagnosis Date    Allergy        Past Surgical History:  Past Surgical History:   Procedure Laterality Date    APPENDECTOMY         Current Outpatient Medications:  Current Outpatient Medications   Medication Sig Dispense Refill    NON SPECIFIED C-Verapamil 15% Perme8, apply topically to effected area daily as prescribed.  Disp 30g, 3 refills 30 Each 3    azithromycin (ZITHROMAX) 250 MG Tab Take 2 tabs on day one and 1 tabs on days 2-5 6 Tablet 0    Clobetasol Propionate 0.05 % Gel Apply  topically.      tadalafil (CIALIS) 10 MG tablet Take 1 Tablet by mouth 1 time a day as needed for Erectile Dysfunction. 10 Tablet 3    mometasone (ELOCON) 0.1 % Cream Apply thin layer to knees and elbows at bedtime 15 g 1    TURMERIC PO Take 1 Cap by mouth.      vitamin D, cholecalciferol, 400 units Tab tablet Take 400 Units by mouth.      Cyanocobalamin (VITAMIN B-12) 1000 MCG Tab Take 1,000 mcg by mouth.      Multiple Vitamin (MULTI-VITAMINS) Tab Take 1 Tab by mouth.       mupirocin (BACTROBAN) 2 % Ointment Apply 1 Application to affected area(s) 2 times a day. 1 Tube 0     No current facility-administered medications for this visit.       Allergies:  Allergies   Allergen Reactions    Pcn [Penicillins]        Family History:  Family History   Problem Relation Age of Onset    Cancer Other        Social History:  Social History     Socioeconomic History    Marital status: Single     Spouse name: Not on file    Number of children: Not on file    Years of education: Not on file    Highest education level: Professional school degree (e.g., MD, DDS, DVM, BURAK)   Occupational History    Not on file   Tobacco Use    Smoking status: Never    Smokeless tobacco: Never   Vaping Use    Vaping status: Never Used   Substance and Sexual Activity    Alcohol use: No    Drug use: Never    Sexual activity: Not on file   Other Topics Concern    Not on file   Social History Narrative    Not on file     Social Drivers of Health     Financial Resource Strain: Low Risk  (8/26/2023)    Overall Financial Resource Strain (CARDIA)     Difficulty of Paying Living Expenses: Not very hard   Food Insecurity: No Food Insecurity (8/26/2023)    Hunger Vital Sign     Worried About Running Out of Food in the Last Year: Never true     Ran Out of Food in the Last Year: Never true   Transportation Needs: No Transportation Needs (8/26/2023)    PRAPARE - Transportation     Lack of Transportation (Medical): No     Lack of Transportation (Non-Medical): No   Physical Activity: Sufficiently Active (8/26/2023)    Exercise Vital Sign     Days of Exercise per Week: 6 days     Minutes of Exercise per Session: 90 min   Stress: Stress Concern Present (8/26/2023)    [employer] - Occupational Stress Questionnaire     Feeling of Stress : To some extent   Social Connections: Socially Isolated (8/26/2023)    Social Connection and Isolation Panel [NHANES]     Frequency of Communication with Friends and Family:  Once a week     Frequency of Social Gatherings with Friends and Family: Once a week     Attends Latter day Services: Never     Active Member of Clubs or Organizations: No     Attends Club or Organization Meetings: Never     Marital Status: Never    Intimate Partner Violence: Not on file   Housing Stability: Low Risk  (8/26/2023)    Housing Stability Vital Sign     Unable to Pay for Housing in the Last Year: No     Number of Places Lived in the Last Year: 1     Unstable Housing in the Last Year: No          Physical Exam:  Physical Exam    Oriented x 3  Weight/BMI: There is no height or weight on file to calculate BMI.  There were no vitals taken for this visit.    Base Eye Exam       Visual Acuity (Snellen - Linear)         Right Left    Dist sc 20/200 20/150    Dist cc 20/25 20/20 -2    Near sc J2 J2              Tonometry (i-care, 11:04 AM)         Right Left    Pressure 13 12              Pupils         Dark Light Shape React APD    Right 5 4 Round Brisk None    Left 5 4 Round Brisk None   Hippus             Visual Fields         Right Left     Full Full              Extraocular Movement         Right Left     Full, Ortho Full, Ortho              Neuro/Psych       Oriented x3: Yes    Mood/Affect: Normal   + cogan twitch  + diplopia on sustained upgaze  5/5 orbicularis oculi and oris strength bilaterally   5/5 deltoids, IP strength bilaterally  +enhanced ptosis                  Additional Tests       Color         Right Left    Ishihara 9/9 9/9              Stereo       Fly: +    Animals: 2/3    Circles: 1/9                  Slit Lamp and Fundus Exam       External Exam         Right Left    External Normal Normal    MRD1 1 mm 5 mm    MRD2 5 mm 5 mm              Slit Lamp Exam         Right Left    Lids/Lashes Normal Normal    Conjunctiva/Sclera White and quiet White and quiet    Cornea Clear Clear    Anterior Chamber Deep and quiet Deep and quiet    Iris Round and reactive Round and reactive    Lens Clear  Clear              Fundus Exam         Right Left    Disc pink, sharp disc margins pink, sharp disc margins, tilted PPA    C/D Ratio 0.2 0.2    Macula Normal Normal                  Refraction       Wearing Rx         Sphere Cylinder Axis    Right -5.75 +1.50 148    Left -5.50 +1.50 036      Age: 2yrs    Type: SVL              Manifest Refraction (Auto)         Sphere Cylinder Axis    Right -5.00 +1.00 125    Left -5.00 +1.25 045                    Pertinent Lab/Test/Imaging Review:      Assessment and Plan:     Ptosis of right eyelid  48 yo healthy man presenting for evaluation of ptosis    Onset several years ago with gradual worsening. Denies any pain, double vision, generalized weakness, dysphagia. No worsening with fatigue.     Exam 4/8/25: no APD, VA 20/25 OD 20/20-2 OS, 9/9 color plates OU, CF full. EOM full, ortho alignment in all gazes. Optic nerves pink with sharp disc margins, tilted nerve OS. RNFL 86 OD 84 OS. +cogan twitch, enhanced ptosis.     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 Will obtain MG panel and anti striated antibodies. However given his lack of fluctuation with fatigue, will not plan for a mestinon trial if negative. If labs negative, suspect involutional ptosis and can refer to Dr Burch for blepharoplasty. Discussed eyelid tape or eyelid crutches for symptomatic relief as well. Pt is considering whether he wants to pursue blepharoplasty.  Given normal pupillary findings and efferent exam, no indication for further imaging at this time.     -MG panel, anti striated  -Consider referral to Dr Burch for blepharoplasty  -Discussed eye lid crutches and tape  -RTC prn, discussed signs and symptoms of concern      Myopia of both eyes  OD -5.75 +1.50 x 148  OS -5.50 +1.50 x 36  VA 20/25 OD 20/20-2 OS  Stable        Garett Soriano M.D.    38 total minutes were spent reviewing imaging, records, examining the patient and documenting.

## 2025-04-08 NOTE — PROCEDURES
OD: RNFL 86, GCL 79    OS: RNFL 84, GCL 79  
OD: pink, sharp disc margins. CD0.2    OS: pink, sharp disc margins, tilted, PPA. CD 0.2  
Statement Selected

## 2025-04-10 LAB — STRIA MUS IGG SER QL IF: NORMAL

## 2025-04-11 LAB
ACHR BIND AB SER-SCNC: 0.2 NMOL/L (ref 0–0.4)
ACHR BLOCK AB/ACHR TOTAL SFR SER: 0 % (ref 0–26)

## 2025-04-14 ENCOUNTER — PATIENT MESSAGE (OUTPATIENT)
Dept: OPHTHALMOLOGY | Facility: MEDICAL CENTER | Age: 50
End: 2025-04-14
Payer: COMMERCIAL

## 2025-04-14 DIAGNOSIS — H52.13 MYOPIA OF BOTH EYES: ICD-10-CM

## 2025-04-14 NOTE — Clinical Note
REFERRAL APPROVAL NOTICE         Sent on April 14, 2025                   Fish Sandoval  [address] [facility] [place] [address]                   Dear Mr. Sandoval,    After a careful review of the medical information and benefit coverage, [facility] has processed your referral. See below for additional details.    If applicable, you must be actively enrolled with your insurance for coverage of the authorized service. If you have any questions regarding your coverage, please contact your insurance directly.    REFERRAL INFORMATION   Referral #:  [id number]  Referred-To Provider    Referred-By Provider:  Ophthalmology    ZACK Coulter MARCUS J Y      [address] [place] [address] [address]  [place] [address]  [phone] [address] [facility] [address]  [place] [id number] [phone]    Referral Start Date:  04/14/2025  Referral End Date:   04/14/2026             SCHEDULING  If you do not already have an appointment, please call [phone] to make an appointment.     MORE INFORMATION  If you do not already have a [employer] account, sign up at: [email or web address]  You can access your medical information, make appointments, see lab results, billing information, and more.  If you have questions regarding this referral, please contact  the [facility] Referrals department at:             [phone]. Monday - Friday 8:00AM - 5:00PM.     Sincerely,    [facility]

## 2025-05-14 ENCOUNTER — PHYSICAL THERAPY (OUTPATIENT)
Dept: PHYSICAL THERAPY | Facility: MEDICAL CENTER | Age: 50
End: 2025-05-14
Attending: PHYSICAL MEDICINE & REHABILITATION
Payer: COMMERCIAL

## 2025-05-14 DIAGNOSIS — S29.011A STRAIN OF RIGHT PECTORALIS MUSCLE, INITIAL ENCOUNTER: ICD-10-CM

## 2025-05-14 PROCEDURE — 97162 PT EVAL MOD COMPLEX 30 MIN: CPT

## 2025-05-14 PROCEDURE — 97110 THERAPEUTIC EXERCISES: CPT

## 2025-05-14 ASSESSMENT — ENCOUNTER SYMPTOMS
QUALITY: ACHING
PAIN SCALE AT HIGHEST: 3
PAIN SCALE AT LOWEST: 0
PAIN SCALE: 0

## 2025-05-14 NOTE — OP THERAPY EVALUATION
"  Outpatient Physical Therapy  INITIAL EVALUATION    [facility] Outpatient Physical Therapy  [address]  [place] [address]  Phone:  [phone]  Fax:  [phone]    Date of Evaluation: 05/14/2025    Patient: Fish Sandoval  YOB: 1975  MRN: [id number]     Referring Provider: Tarik Whitlock M.D.  [address]  [address]  [place] [address]   Referring Diagnosis Strain of right pectoralis muscle, initial encounter [S29.011A];Lumbar spondylosis [M47.816];Lumbar radiculopathy [M54.16];Chronic shoulder pain, unspecified laterality [M25.519, G89.29];Chronic hip pain, bilateral [M25.551, M25.552, G89.29]     Time Calculation  Start time: 1633  Stop time: 1725 Time Calculation (min): 52 minutes         Chief Complaint: Chest Wall Pain    Visit Diagnoses     ICD-10-CM   1. Strain of right pectoralis muscle, initial encounter  S29.011A            Date PT Care Plan Established or Reviewed:   5/14/25    Date PT Treatment Started: 5/14/25          Subjective:   History of Present Illness:     Mechanism of injury:  Patient is a 49 year old male with a PMH including: Overactive bladder, appendectomy, low back and hip pain.    Pt presents to therapy with complaints of pec/shoulder discomfort; this started in end Jan/early Feb. Saw physiatry in March. Believes this occurred during dumbbell chest fly's; no pain during but noticed discomfort afterwards. Has no pain at rest but notices pain with activity. Notices pain at the arm pit that does not radiate. Neck pain \"comes and goes.\" Denies numbness/tingling in arms. Denies dropping objects due to weakness. No new headaches.     Per Dr. Whitlock's note on 2/24/25, \"The patient reports a worsening of shoulder pain, particularly in the right pectoral and triceps regions, with moderate to severe intensity. He has been attending occupational therapy and physical therapy. He describes a new onset of pain localized to the " "axillary region, slightly beneath the pectoral muscles, but not extending to the triceps. This discomfort has been present for 5 weeks and is particularly noticeable during chest press exercises, preventing him from performing flies and weighted pull-ups. Despite the pain, he continues to engage in weightlifting. He does not report any acute injury, popping sensation, or muscle deformity. Additionally, he experiences left shoulder pain, which he attributes to chronic lifting. He has attempted to increase the depth of his chest press, resulting in left shoulder aches. He exclusively uses dumbbells for his chest press and avoids inclines due to the associated shoulder strain. He has not incorporated rotator cuff exercises into his routine for several years.\"    Currently denies changes in bowel and bladder, saddle anesthesia, significant weight changes, chills/night sweats, nausea and vomiting, and unexplained fatigue. Denies history of cancer. Pt consents to evaluation and treatment today.                 Headaches:  no headaches  Ear problems: none  Sleep disturbance: intermittent disruptions in shoulder, forearms, or pec.  Pain:     Current pain ratin    At best pain ratin    At worst pain rating:  3    Location:  R axillary region; can radiate to lower aspect of pec    Quality:  Aching (\"mild pulling\")    Progression:  Improving    Pain Comments::  Aggravating: chest fly's, biceps curls-newer, \"random movement\" of the arm OH, walking the dog    Relieving: resting/not moving, massage gun, lying down   Hand dominance:  Right  Treatments:     Treatment Comments:  Activity modifications; otherwise None  Activities of Daily Living:     Patient reported ADL status: Activities of Daily Living:     Patient reported ADL status: Patient's current daily routine includes:  Work: Security -computer job; (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, 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 hours  Exercise: 3 days on 1 day off; modification of upper body workouts " due to pain        Patient Goals:     Other patient goals:  Improve weight lifting without pain, walking dog without pain      Past Medical History[1]  Past Surgical History[2]  Social History     Tobacco Use   • Smoking status: Never   • Smokeless tobacco: Never   Substance Use Topics   • Alcohol use: No     Social Hx - Family and Occupational[3]    Objective     Postural Observations  Seated posture: fair    Additional Postural Observation Details  Forward head and rounded shoulders    Shoulder Screen    Shoulder range of motion within functional limits with the following exceptions: Shoulder AROM seated:  137 deg L shoulder flexion, 152 deg L abduction,  150 deg R shoulder flexion, 143 deg R abduction *stretch in axillary region  70 deg R GH ER at side; 65 deg L GH ER  WFL GH IR BTB B *pain with return from position     Palpation     Additional Palpation Details  TTP R pec and subscapularis (reproductive of pain complaints)    Active Range of Motion     Cervical Spine   Flexion: within functional limits  Extension: within functional limits  Left lateral flexion: Active left cervical lateral flexion: 19 deg.  Right lateral flexion: Active right cervical lateral flexion: 30 deg.  Left rotation: Active left cervical rotation: 42 deg.  Right rotation: Active right cervical rotation: 60 deg.    Additional Active Range of Motion Details  No pain with OP    Strength:      Additional Strength Details  GH isometrics: ( set up: 0 deg GH abd and elbow 90 deg)    L GH: all WFL    R GH:  Flexion: Strong   Extension: Strong  Abd: Strong   Add: Strong   IR: Strong   ER: Strong and painful at chest          Therapeutic Exercises (CPT 75240):     1. pt education, re: PT exam findings and upcoming POC; review of prev hep from prior PT episode (FR, shoulder mobilizations, ect)    2. new hep as below      Therapeutic Exercise Summary: Access Code: [email or web address]  URL: [email or web address]  Date: 05/15/2025  Prepared by: Gavin  [facility]    Exercises  - Standing Isometric Shoulder External Rotation with Doorway  - 2-3 x daily - 7 x weekly - 1 sets - 10 reps - 10 sec  hold  - Standing Isometric Shoulder Internal Rotation with Towel Roll at Doorway  - 2-3 x daily - 7 x weekly - 1 sets - 10 reps - 10 sec hold    Pt performed these exercises with instruction and SPV.  Provided handout with these exercises for daily HEP.        Time-based treatments/modalities:    Physical Therapy Timed Treatment Charges  Therapeutic exercise minutes (CPT 39250): 22 minutes      Assessment, Response and Plan:   Impairments: abnormal or restricted ROM, activity intolerance, lacks appropriate home exercise program and pain with function    Assessment details:  Patient is a pleasant and cooperative 49-year-old R-hand-dom male who is known to this author. Prev seen for back and hip pain complaints with improvements. Returning today for chest wall pain on R since Jan/Feb, possibly due to UE weight lifting. Exam findings suggestive of TTP at chest and subscap with deficits in R shoulder flexion with discomfort. Fair postural awareness and hypomobility at t/s as additional findings. Pt may benefit from skilled physical therapy in order to address above impairments in order to improve QOL and return to reported ADL's.     Prognosis: good    Goals:   Short Term Goals:   1. Pt will be independent with written HEP.      Short term goal time span:  2-4 weeks      Long Term Goals:    1. Pt will be independent with written HEP.  2. Pt will have a sig improvement in Quickdash (eval:9.09)  3. Pt will be able to return to normal UE weight lifting routine.   4. Pt will be able to walk his dog pain-free consistently to improve ADL's.       Long term goal time span:  6-8 weeks    Plan:   Therapy options:  Physical therapy treatment to continue  Planned therapy interventions:  Neuromuscular Re-education (CPT 54016), Gait Training (CPT 73262), E Stim Unattended (CPT 25943),  Therapeutic Exercise (CPT 07894), Therapeutic Activities (CPT 91554) and Manual Therapy (CPT 58029)  Frequency: 1-2x/wk.  Duration in weeks:  8  Discussed with:  Patient  Plan details:  UPOC: 7/11/25        Functional Assessment Used  Quickdash General Total Score: 9.09     Referring provider co-signature:  I have reviewed this plan of care and my co-signature certifies the need for services.    Certification Period: 05/14/2025 to  7/11/25    Physician Signature: ________________________________ Date: ______________                        [1]  Past Medical History:  Diagnosis Date   • Allergy    [2]  Past Surgical History:  Procedure Laterality Date   • APPENDECTOMY     [3]  Family and Occupational History  Socioeconomic History   • Marital status: Single     Spouse name: Not on file   • Number of children: Not on file   • Years of education: Not on file   • Highest education level: Professional school degree (e.g., MD, DDS, DVM, BURAK)   Occupational History   • Not on file

## 2025-05-21 ENCOUNTER — PHYSICAL THERAPY (OUTPATIENT)
Dept: PHYSICAL THERAPY | Facility: MEDICAL CENTER | Age: 50
End: 2025-05-21
Attending: PHYSICAL MEDICINE & REHABILITATION
Payer: COMMERCIAL

## 2025-05-21 DIAGNOSIS — S29.011A STRAIN OF RIGHT PECTORALIS MUSCLE, INITIAL ENCOUNTER: Primary | ICD-10-CM

## 2025-05-21 PROCEDURE — 97140 MANUAL THERAPY 1/> REGIONS: CPT

## 2025-05-21 PROCEDURE — 97110 THERAPEUTIC EXERCISES: CPT

## 2025-05-21 NOTE — OP THERAPY DAILY TREATMENT
Outpatient Physical Therapy  DAILY TREATMENT     [facility] Outpatient Physical Therapy  [address]  [place] [address]  Phone:  [phone]  Fax:  [phone]    Date: 05/21/2025    Patient: Fish Sandoval  YOB: 1975  MRN: [id number]     Time Calculation    Start time: 1632  Stop time: 1724 Time Calculation (min): 52 minutes         Chief Complaint: Shoulder Problem    Visit #: 2    SUBJECTIVE:  Pt stating he has been working the exercises more than 50%. Notices some soreness during exercises. Today is a good day. Noticed some soreness up until today.       OBJECTIVE:  Current objective measures:     Shoulder range of motion     Shoulder AROM pre-treatment seated:    142 deg R shoulder flexion  123 deg R shoulder abduction  64 deg R GH ER at side  WFL GH IR BTB B     Shoulder range of motion     Shoulder AROM post-treatment seated:    155 deg R shoulder flexion  147 deg R shoulder abduction  73 deg R GH ER at side   WFL GH BTB B *pain at end-range      Therapeutic Exercises (CPT 13469):     1. FR sequence-open books, alt GH flexion, t/s extensions, 15x ea, warm up    2. new hep as below    3. [facility], 2x30 reps, hep at doorway    4. shoulder extensions with dowel +hip hinges, x20    5. lat stretch seated, x30 sec, hep    6. L-stretch standing, x30 sec, hep    7. GH isometrics at wall, verbal review to complete with gentle isometrics only      Therapeutic Exercise Summary: Access Code: [email or web address]  URL: [email or web address]  Date: 05/15/2025  Prepared by: Gavin Pimentel    Exercises  - Standing Isometric Shoulder External Rotation with Doorway  - 2-3 x daily - 7 x weekly - 1 sets - 10 reps - 10 sec  hold  - Standing Isometric Shoulder Internal Rotation with Towel Roll at Doorway  - 2-3 x daily - 7 x weekly - 1 sets - 10 reps - 10 sec hold    Pt performed these exercises with instruction and SPV.  Provided handout  "with these exercises for daily HEP.        Therapeutic Treatments and Modalities:     1. Manual Therapy (CPT 27401), see below    Therapeutic Treatment and Modalities Summary: Manual:  Inf and abd scapular glides gr III in SL with pillow barrier  STM to subscap R and pec in hooklying    Time-based treatments/modalities:    Physical Therapy Timed Treatment Charges  Manual therapy minutes (CPT 26580): 12 minutes  Therapeutic exercise minutes (CPT 33564): 40 minutes      Pain rating (1-10) before treatment:  \"sore\"  Pain rating (1-10) after treatment:  3/10    ASSESSMENT:   Response to treatment:   Significant improvement in shoulder elevation ROM following manual and stretching to anterior shoulder. Suspect patient will do well if compliant to his hep with speedy recovery.      PLAN/RECOMMENDATIONS:   Plan for treatment: therapy treatment to continue next visit.  Planned interventions for next visit: continue with current treatment.         "

## 2025-05-28 ENCOUNTER — PHYSICAL THERAPY (OUTPATIENT)
Dept: PHYSICAL THERAPY | Facility: MEDICAL CENTER | Age: 50
End: 2025-05-28
Attending: PHYSICAL MEDICINE & REHABILITATION
Payer: COMMERCIAL

## 2025-05-28 DIAGNOSIS — S29.011A STRAIN OF RIGHT PECTORALIS MUSCLE, INITIAL ENCOUNTER: Primary | ICD-10-CM

## 2025-05-28 PROCEDURE — 97110 THERAPEUTIC EXERCISES: CPT

## 2025-05-28 PROCEDURE — 97140 MANUAL THERAPY 1/> REGIONS: CPT

## 2025-05-28 NOTE — OP THERAPY DAILY TREATMENT
Outpatient Physical Therapy  DAILY TREATMENT     [facility] Outpatient Physical Therapy  [address]  [place] [address]  Phone:  [phone]  Fax:  [phone]    Date: 05/28/2025    Patient: Fish Sandoval  YOB: 1975  MRN: [id number]     Time Calculation    Start time: 1632  Stop time: 1716 Time Calculation (min): 44 minutes         Chief Complaint: Chest Wall Pain    Visit #: 3    SUBJECTIVE:  Pt stating he completed a seated row and felt pain in his SIJ; feels some of that discomfort right now. Sometimes the pec can fire intermittently without warning.      OBJECTIVE:  Current objective measures:     Shoulder range of motion     Shoulder AROM pre-treatment seated:  135 deg R shoulder flexion (mild pain and stiffness)   140 deg R shoulder abduction  75 deg R GH ER at side  WFL GH IR BTB B     +active SLR R  Slightly ant rotated innominate L   Hypomobility at R GHJ    Shoulder AROM post-treatment seated:  152 deg R shoulder flexion  157 deg R shoulder abduction  *stiffness at end-ranges    Therapeutic Exercises (CPT 19728):     1. FR sequence-open books, alt GH flexion, t/s extensions, 15x ea, warm up    2. UBE, x5 min, cardiovascular warm up    [facility]. [facility], 2[facility] reps, verbal review    4. shoulder extensions with dowel +hip hinges, x20, verbal review    5. lat stretch seated, x30 sec, verbal review    6. L-stretch standing, x30 sec, verbal review    7. GH isometrics at wall, NT    8. ball roll out    9. correction for slightly ant rotated innominate L with shot gun, 3x10 sec ea    10. pt education, re: posture correction    20. UPOC: 6/14/25      Therapeutic Exercise Summary: Access Code: [email or web address]  URL: [email or web address]  Date: 05/15/2025  Prepared by: Gavin Pimentel    Exercises  - Standing Isometric Shoulder External Rotation with Doorway  - 2-3 x daily - 7 x weekly - 1 sets - 10 reps - 10 sec  hold  -  Standing Isometric Shoulder Internal Rotation with Towel Roll at Doorway  - 2-3 x daily - 7 x weekly - 1 sets - 10 reps - 10 sec hold    Pt performed these exercises with instruction and SPV.  Provided handout with these exercises for daily HEP.        Therapeutic Treatments and Modalities:     1. Manual Therapy (CPT 52496), see below    Therapeutic Treatment and Modalities Summary: Manual:  CPA and rotational mobs gr III CTJ-T10; level V at mid t/s   Inf and abd scapular glides gr III in SL with pillow barrier -NT  STM to subscap R and pec in hooklying   AP mobs gr III at R GHJ in lying    Time-based treatments/modalities:    Physical Therapy Timed Treatment Charges  Manual therapy minutes (CPT 51909): 14 minutes  Therapeutic exercise minutes (CPT 65741): 30 minutes      Pain rating (1-10) before treatment: 0/10   Pain rating (1-10) after treatment:  3/10    ASSESSMENT:   Response to treatment:   Sig improvement in GHJ mobility following manual; increased shoulder elevation following emphasis at this area and t/s. Will review shoulder mobs in next session and assess first rib mobility.     PLAN/RECOMMENDATIONS:   Plan for treatment: therapy treatment to continue next visit.  Planned interventions for next visit: continue with current treatment.

## 2025-06-04 ENCOUNTER — PHYSICAL THERAPY (OUTPATIENT)
Dept: PHYSICAL THERAPY | Facility: MEDICAL CENTER | Age: 50
End: 2025-06-04
Attending: PHYSICAL MEDICINE & REHABILITATION
Payer: COMMERCIAL

## 2025-06-04 DIAGNOSIS — S29.011A STRAIN OF RIGHT PECTORALIS MUSCLE, INITIAL ENCOUNTER: Primary | ICD-10-CM

## 2025-06-04 PROCEDURE — 97110 THERAPEUTIC EXERCISES: CPT

## 2025-06-04 PROCEDURE — 97140 MANUAL THERAPY 1/> REGIONS: CPT

## 2025-06-04 NOTE — OP THERAPY DAILY TREATMENT
Outpatient Physical Therapy  DAILY TREATMENT     [facility] Outpatient Physical Therapy  [address]  [place] [address]  Phone:  [phone]  Fax:  [phone]    Date: 06/04/2025    Patient: Fish Sandoval  YOB: 1975  MRN: [id number]     Time Calculation    Start time: 1547  Stop time: 1643 Time Calculation (min): 56 minutes         Chief Complaint: Shoulder Problem    Visit #: 4    SUBJECTIVE:  Pt stating he feels a little better. ROM improved. During exercise, doesn't notice a lot of pain. Will still notice the episodes, more frequent but less intense. Will also experience these s/s sitting without movement; occasionally at inner arm pit.       OBJECTIVE:  Current objective measures:     Shoulder range of motion   Shoulder AROM pre-treatment seated:  149 deg R shoulder flexion   135 deg R shoulder abduction  70 deg R GH ER at side  T7 GH IR BTB    Neural tension tests: WFL radial, medial, and ulnar    First rib mobility: WFL Bilat     Shoulder AROM post-treatment seated:  155 deg R shoulder flexion  140 deg R shoulder abduction  *stiffness at end-ranges    Therapeutic Exercises (CPT 06576):     1. FR sequence-open books, alt GH flexion, t/s extensions, 15x ea, warm up    2. pulleys, x5 min, cardiovascular warm up    [facility]. [facility], 2[facility] reps, NT    4. shoulder extensions with dowel +hip hinges, x20, NT    5. lat stretch seated, x30 sec, NT    6. L-stretch standing, x30 sec, NT    7. GH isometrics at wall, NT    8. ball roll out, NT    11. pull overs, 10->12# dumbbell, x10, fatigue; hep    12. SA OH with pillowcase, x20 hooklying x10 standing, discomfort at UT; transition to standing with improvements; hep    13. UT stretch, 30 sec, decreased UT soreness    20. UPOC: 6/14/25      Therapeutic Exercise Summary: Access Code: [email or web address]  URL: [email or web address]  Date: 05/15/2025  Prepared by: Gavin  Margarito    Exercises  - Standing Isometric Shoulder External Rotation with Doorway  - 2-3 x daily - 7 x weekly - 1 sets - 10 reps - 10 sec  hold  - Standing Isometric Shoulder Internal Rotation with Towel Roll at Doorway  - 2-3 x daily - 7 x weekly - 1 sets - 10 reps - 10 sec hold    Pt performed these exercises with instruction and SPV.  Provided handout with these exercises for daily HEP.        Therapeutic Treatments and Modalities:     1. Manual Therapy (CPT 58536), see below    2. Hot or Cold Pack Therapy (CPT 62491), cold pack 15 min at UT, pain management    Therapeutic Treatment and Modalities Summary: Manual:  CPA and rotational mobs gr III CTJ-T10; level V at mid t/s   Inf and abd scapular glides gr III in SL with pillow barrier -NT  STM to subscap R and pec in hooklying   AP and inf mobs gr III at R GHJ in lying    Time-based treatments/modalities:    Physical Therapy Timed Treatment Charges  Manual therapy minutes (CPT 04165): 21 minutes  Therapeutic exercise minutes (CPT 90496): 20 minutes      Pain rating (1-10) before treatment: 0/10   Pain rating (1-10) after treatment:  3/10    ASSESSMENT:   Response to treatment:   Improvements in shoulder AROM following manual and exercises in session. Discomfort at R UT following SA OH with pillocase; pt reporting fatigue with possible compensations with UT dom and l/s extension patterns. Improved with standing modification. Ice for pain management at end of visit. R GHJ continues to be impaired, cindy with abduction. Will continue to address hypomobility and strength deficits at periscapular region.    PLAN/RECOMMENDATIONS:   Plan for treatment: therapy treatment to continue next visit.  Planned interventions for next visit: continue with current treatment.

## 2025-06-09 ENCOUNTER — PHYSICAL THERAPY (OUTPATIENT)
Dept: PHYSICAL THERAPY | Facility: MEDICAL CENTER | Age: 50
End: 2025-06-09
Attending: PHYSICAL MEDICINE & REHABILITATION
Payer: COMMERCIAL

## 2025-06-09 DIAGNOSIS — S29.011A STRAIN OF RIGHT PECTORALIS MUSCLE, INITIAL ENCOUNTER: Primary | ICD-10-CM

## 2025-06-09 PROCEDURE — 97110 THERAPEUTIC EXERCISES: CPT

## 2025-06-09 PROCEDURE — 97012 MECHANICAL TRACTION THERAPY: CPT

## 2025-06-09 NOTE — OP THERAPY DAILY TREATMENT
Outpatient Physical Therapy  DAILY TREATMENT     [facility] Outpatient Physical Therapy  [address]  [place] [address]  Phone:  [phone]  Fax:  [phone]    Date: 06/09/2025    Patient: Fish Sandoval  YOB: 1975  MRN: [id number]     Time Calculation    Start time: 1415  Stop time: 1518 Time Calculation (min): 63 minutes         Chief Complaint: Shoulder Problem    Visit #: 5    SUBJECTIVE:  Pt stating he will notice intermittent discomfort; it is still frequent, more during the day. Notices more ROM and improved work outs. Overall 30-39% improved. Will still notice some discomfort     OBJECTIVE:  Current objective measures:     Shoulder range of motion   Shoulder AROM pre-treatment seated:  137 deg R shoulder flexion   139 deg R shoulder abduction  63 deg R GH ER at side  T7 GH IR BTB (discomfort)     Shoulder AROM post-treatment seated:  150 deg R shoulder flexion  160 deg R shoulder abduction  GH IR BTB: T7 (40% improved following session)  *stiffness at end-ranges    Therapeutic Exercises (CPT 00706):     1. FR sequence-open books, alt GH flexion, t/s extensions, 15x ea, warm up    2. pulleys, x5 min, cardiovascular warm up    3. shoulder extension stretch at [facility], 2x30 reps, NT    4. shoulder extensions with dowel +hip hinges, x20, NT    5. lat stretch seated, x30 sec, NT    6. L-stretch standing, x30 sec, NT    7. GH isometrics at wall, NT    8. ball roll out, NT    11. pull overs, 10->12# dumbbell, x10, NT    12. SA OH with pillowcase, x20 hooklying x10 standing, NT    13. UT stretch, 30 sec, NT    15. SNAGs- extensions, x15    16. SNAGs-rotations, x15 ea, incr restrictions noted on L; hep    17. c/s retractions in prone lying, x15, hypomobility noted    18. 1st rib mobs with belt, x15 ea, tightness to L; hep    20. UPOC: 6/14/25      Therapeutic Exercise Summary: Access Code: [email or web address]  URL: [email or web address]  Date:  05/15/2025  Prepared by: Gavin Pimentel    Exercises  - Standing Isometric Shoulder External Rotation with Doorway  - 2-3 x daily - 7 x weekly - 1 sets - 10 reps - 10 sec  hold  - Standing Isometric Shoulder Internal Rotation with Towel Roll at Doorway  - 2-3 x daily - 7 x weekly - 1 sets - 10 reps - 10 sec hold    Pt performed these exercises with instruction and SPV.  Provided handout with these exercises for daily HEP.        Therapeutic Treatments and Modalities:     1. Manual Therapy (CPT 67292), NT    3. Mechanical Traction (CPT 85241), 25/10# x7 min, 27.10 x 8 min c/s traction with mhp    Time-based treatments/modalities:    Physical Therapy Timed Treatment Charges  Therapeutic exercise minutes (CPT 89702): 48 minutes      Pain rating (1-10) before treatment: 0/10   Pain rating (1-10) after treatment:  0/10    ASSESSMENT:   Response to treatment:   Significant improvements in shoulder AROM and decrease in pain following session. Suspect cervical and 1st rib contribution based on improvements noted; if pos carryover from today's session including traction, will continue to incorporate this into future visits.     PLAN/RECOMMENDATIONS:   Plan for treatment: therapy treatment to continue next visit.  Planned interventions for next visit: continue with current treatment.

## 2025-06-11 ENCOUNTER — APPOINTMENT (OUTPATIENT)
Dept: PHYSICAL THERAPY | Facility: MEDICAL CENTER | Age: 50
End: 2025-06-11
Attending: PHYSICAL MEDICINE & REHABILITATION
Payer: COMMERCIAL

## 2025-06-16 ENCOUNTER — PHYSICAL THERAPY (OUTPATIENT)
Dept: PHYSICAL THERAPY | Facility: MEDICAL CENTER | Age: 50
End: 2025-06-16
Attending: PHYSICAL MEDICINE & REHABILITATION
Payer: COMMERCIAL

## 2025-06-16 DIAGNOSIS — S29.011A STRAIN OF RIGHT PECTORALIS MUSCLE, INITIAL ENCOUNTER: Primary | ICD-10-CM

## 2025-06-16 PROCEDURE — 97140 MANUAL THERAPY 1/> REGIONS: CPT

## 2025-06-16 PROCEDURE — 97012 MECHANICAL TRACTION THERAPY: CPT

## 2025-06-16 PROCEDURE — 97110 THERAPEUTIC EXERCISES: CPT

## 2025-06-16 NOTE — OP THERAPY DAILY TREATMENT
Outpatient Physical Therapy  DAILY TREATMENT     [facility] Outpatient Physical Therapy  [address]  [place] [address]  Phone:  [phone]  Fax:  [phone]    Date: 06/16/2025    Patient: Fish Sandoval  YOB: 1975  MRN: [id number]     Time Calculation    Start time: 1502  Stop time: 1600 Time Calculation (min): 58 minutes         Chief Complaint: Shoulder Problem    Visit #: 6    SUBJECTIVE:  Pt stating he noticed improvement in motion at neck and upper back but not noticing if it was a sig change as far as pain complaints. Will still get pain in the subscapularis in about 2-3x/day; this occurs at rest and at end day. Noticed some pain after his chest exercises and after bar bell curls; normally able to complete exercises without pain. S/s can increase as the day progresses. Overall, still feels improved.        OBJECTIVE:  Current objective measures:     Shoulder range of motion   R Shoulder AROM pre-treatment seated:  145 deg R shoulder flexion   150 deg R shoulder abduction  70 deg R GH ER at side  T7 GH IR BTB (discomfort with initial movement)     Shoulder AROM post-treatment seated:  152 deg R shoulder flexion  158 deg R shoulder abduction  GH IR BTB: T6 (25-35% improved following session)  *stiffness at end-ranges    Therapeutic Exercises (CPT 16330):     1. FR sequence-open books, alt GH flexion, t/s extensions, 15x ea, warm up    2. pulleys, GH flexion and GH IR, x2 min ea, cardiovascular warm up    3. shoulder extension stretch at [facility], 2x30 reps, NT    4. shoulder extensions with dowel +hip hinges, x20, NT    5. lat stretch seated, x30 sec, NT    6. L-stretch standing, x30 sec, NT    7. GH isometrics at wall, NT    8. ball roll out, NT    11. pull overs, 10->12# dumbbell, x10, NT    12. SA OH with pillowcase, x20 hooklying x10 standing, NT    13. UT stretch, 30 sec, NT    14. prone angels, 2 rounds to fatigue, fatigue with decr  strength noted with increasing OH, hep    15. SNAGs- extensions, x15, NT    16. SNAGs-rotations, x15 ea, NT    17. c/s retractions in prone lying, x15, NT    18. 1st rib mobs with belt, x15 ea, reviewed; VC's for set up    19. RC walk aways with 1# dumbbell and pillowcase at wall, 2 rounds to fatigue, fatigue; visible muscle juddering at RLE; hep    20. UPOC: 6/14/25      Therapeutic Exercise Summary: Access Code: [email or web address]  URL: [email or web address]  Date: 05/15/2025  Prepared by: Gavin Pimentel    Exercises  - Standing Isometric Shoulder External Rotation with Doorway  - 2-3 x daily - 7 x weekly - 1 sets - 10 reps - 10 sec  hold  - Standing Isometric Shoulder Internal Rotation with Towel Roll at Doorway  - 2-3 x daily - 7 x weekly - 1 sets - 10 reps - 10 sec hold    Pt performed these exercises with instruction and SPV.  Provided handout with these exercises for daily HEP.        Therapeutic Treatments and Modalities:     1. Manual Therapy (CPT 22176), see below, x7 min    3. Mechanical Traction (CPT 47980), 25/10# x15 min c/s traction with mhp    Therapeutic Treatment and Modalities Summary: Manual:  CPA and rotational mobs gr III and IV at CTJ-T10 in prone lying      Time-based treatments/modalities:    Physical Therapy Timed Treatment Charges  Manual therapy minutes (CPT 62695): 7 minutes  Therapeutic exercise minutes (CPT 11126): 36 minutes  Pain rating (1-10) before treatment: 0/10   Pain rating (1-10) after treatment:  0/10      ASSESSMENT:   Response to treatment:   Fatigue with periscapular endurance training, cindy with increasing OH range and with RC strengthening. Pos response following this and first rib mobs with sig changes in shoulder AROM. Pt has been able to maintain some gains from prior session. Will continue to incorporate this txt into follow ups.     PLAN/RECOMMENDATIONS:   Plan for treatment: therapy treatment to continue next visit.  Planned interventions for next visit: continue  with current treatment.  Trial of manual c/s retractions  Continue periscapular endurance and RC training

## 2025-06-18 ENCOUNTER — APPOINTMENT (OUTPATIENT)
Dept: PHYSICAL THERAPY | Facility: MEDICAL CENTER | Age: 50
End: 2025-06-18
Attending: PHYSICAL MEDICINE & REHABILITATION
Payer: COMMERCIAL

## 2025-06-23 ENCOUNTER — APPOINTMENT (OUTPATIENT)
Dept: PHYSICAL THERAPY | Facility: MEDICAL CENTER | Age: 50
End: 2025-06-23
Attending: PHYSICAL MEDICINE & REHABILITATION
Payer: COMMERCIAL

## 2025-06-25 ENCOUNTER — PHYSICAL THERAPY (OUTPATIENT)
Dept: PHYSICAL THERAPY | Facility: MEDICAL CENTER | Age: 50
End: 2025-06-25
Attending: PHYSICAL MEDICINE & REHABILITATION
Payer: COMMERCIAL

## 2025-06-25 DIAGNOSIS — S29.011A STRAIN OF RIGHT PECTORALIS MUSCLE, INITIAL ENCOUNTER: Primary | ICD-10-CM

## 2025-06-25 PROCEDURE — 97140 MANUAL THERAPY 1/> REGIONS: CPT

## 2025-06-25 NOTE — OP THERAPY DAILY TREATMENT
Outpatient Physical Therapy  DAILY TREATMENT     [facility] Outpatient Physical Therapy  [address]  [place] [address]  Phone:  [phone]  Fax:  [phone]    Date: 06/25/2025    Patient: Fish Sandoval  YOB: 1975  MRN: [id number]     Time Calculation    Start time: 1545  Stop time: 1645 Time Calculation (min): 60 minutes         Chief Complaint: Shoulder Problem    Visit #: 7    SUBJECTIVE:  Pt stating he feels tired today, worked out earlier. Upper pecs feel improved. Pain feels like it moved a little lower and deeper. Believes pain has decreased but unexpected frequency is about the same. Stating his low back hurts when he completes the prone angels. Overall pain reduction of 50%.     OBJECTIVE:  Current objective measures:     Shoulder range of motion   R Shoulder AROM pre-treatment seated:  150 deg R shoulder flexion   152 deg R shoulder abduction  70 deg R GH ER at side  T7 GH IR BTB (feels okay today)     Shoulder AROM post-treatment seated:  160 deg R shoulder flexion      Therapeutic Exercises (CPT 80240):     1. FR sequence-open books, alt GH flexion, t/s extensions, 15x ea, warm up    2. pulleys, GH flexion and GH IR, x2 min ea, cardiovascular warm up    3. shoulder extension stretch at [facility], 2x30 reps, NT    4. shoulder extensions with dowel +hip hinges, x20, NT    5. lat stretch seated, x30 sec, NT    6. L-stretch standing, x30 sec, NT    7. GH isometrics at wall, NT    8. ball roll out, NT    11. pull overs, 10->12# dumbbell, x10, NT    12. SA OH with pillowcase, x20 hooklying x10 standing, NT    13. UT stretch, 30 sec, NT    14. prone angels, 2 rounds to fatigue, fatigue with decr strength noted with increasing OH, hep    15. SNAGs- extensions, x15, NT    16. SNAGs-rotations, x15 ea, NT    17. c/s retractions in prone lying, x15, NT    18. 1st rib mobs with belt, x15 ea, reviewed; VC's for set up    19.  walk aways with 1#  dumbbell and pillowcase at wall, 2 rounds to fatigue, fatigue; visible muscle juddering at RLE; hep    20. UPOC: 6/14/25      Therapeutic Exercise Summary: Access Code: [email or web address]  URL: [email or web address]  Date: 06/25/2025  Prepared by: Gavin Pimentel    Exercises  - Standing Isometric Shoulder External Rotation with Doorway  - 2-3 x daily - 7 x weekly - 1 sets - 10 reps - 10 sec  hold  - Standing Isometric Shoulder Internal Rotation with Towel Roll at Doorway  - 2-3 x daily - 7 x weekly - 1 sets - 10 reps - 10 sec hold  - Seated Assisted Cervical Rotation with Towel  - 3-4 x weekly - 3 sets - 10 reps  - Prone Bicknell  - 3 x weekly - 3 sets  - Wall Bicknell  - 3 x weekly - 3 sets - 15 reps    Pt performed these exercises with instruction and SPV.  Provided handout with these exercises for daily HEP.        Therapeutic Treatments and Modalities:     1. Manual Therapy (CPT 96427), see below, x25 min    3. Mechanical Traction (CPT 56079), 25/10# x15 min c/s traction with mhp    Therapeutic Treatment and Modalities Summary: Manual to R shoulder:     CPA and rotational mobs gr III and IV at CTJ-T10 in prone lying    STM to pec (using C and S STM techniques with additional trigger point release)    STM to subclavius      Time-based treatments/modalities:    Physical Therapy Timed Treatment Charges  Manual therapy minutes (CPT 46332): 25 minutes  Therapeutic exercise minutes (CPT 26758): 5 minutes  Pain rating (1-10) before treatment: 0/10   Pain rating (1-10) after treatment:  0/10      ASSESSMENT:   Response to treatment:   Significant improvement in shoulder AROM on R, able to achieve normal range in sitting following manual. Most improvement following release to subclavius, pec, and t/s mobilizations. Will assess carryover in next visit and continue to progress strength and endurance to maintain mobility gains.     PLAN/RECOMMENDATIONS:   Plan for treatment: therapy treatment to continue next visit.  Planned  interventions for next visit: continue with current treatment.  PN due next visit

## 2025-07-10 DIAGNOSIS — E56.9 VITAMIN DEFICIENCY: ICD-10-CM

## 2025-07-10 DIAGNOSIS — Z12.5 SCREENING PSA (PROSTATE SPECIFIC ANTIGEN): ICD-10-CM

## 2025-07-10 DIAGNOSIS — E78.00 PURE HYPERCHOLESTEROLEMIA: ICD-10-CM

## 2025-07-10 DIAGNOSIS — G47.33 OSA (OBSTRUCTIVE SLEEP APNEA): ICD-10-CM

## 2025-07-10 DIAGNOSIS — E78.41 ELEVATED LIPOPROTEIN(A): ICD-10-CM

## 2025-07-10 DIAGNOSIS — Z00.00 WELL ADULT EXAM: Primary | ICD-10-CM

## 2025-07-15 ENCOUNTER — PHYSICAL THERAPY (OUTPATIENT)
Dept: PHYSICAL THERAPY | Facility: MEDICAL CENTER | Age: 50
End: 2025-07-15
Attending: PHYSICAL MEDICINE & REHABILITATION
Payer: COMMERCIAL

## 2025-07-15 DIAGNOSIS — S29.011A STRAIN OF RIGHT PECTORALIS MUSCLE, INITIAL ENCOUNTER: Primary | ICD-10-CM

## 2025-07-15 PROCEDURE — 97110 THERAPEUTIC EXERCISES: CPT

## 2025-07-15 NOTE — OP THERAPY DAILY TREATMENT
Outpatient Physical Therapy  DAILY TREATMENT     [facility] Outpatient Physical Therapy  [address]  [place] [address]  Phone:  [phone]  Fax:  [phone]    Date: 07/15/2025    Patient: Fish Sandoval  YOB: 1975  MRN: [id number]     Time Calculation    Start time: 1546  Stop time: 1647 Time Calculation (min): 61 minutes         Chief Complaint: Shoulder Problem    Visit #: 8    SUBJECTIVE:  Pt stating he has noticed some improvements but notes that the pain did move from his chest to his arm. Had some pain with bicep curls. Is still avoiding chest flys as these cause him pain.      OBJECTIVE:  Current objective measures:   [employer] General Total Score: 18.18   See PN for details    Therapeutic Exercises (CPT 95305):     1. FR sequence-open books, alt GH flexion, t/s extensions, 15x ea, warm up    2. review of hep    3. objective measures    4. indep traction unit for hep, created for pt for home use; edu to utilize for 5 min and assess before increasing time duration    5. self STM to subclavius and coracobrachialis    20. UPOC: 8/15/25      Therapeutic Exercise Summary: Access Code: [email or web address]  URL: [email or web address]  Date: 06/25/2025  Prepared by: Gavin Pimentel    Exercises  - Standing Isometric Shoulder External Rotation with Doorway  - 2-3 x daily - 7 x weekly - 1 sets - 10 reps - 10 sec  hold  - Standing Isometric Shoulder Internal Rotation with Towel Roll at Doorway  - 2-3 x daily - 7 x weekly - 1 sets - 10 reps - 10 sec hold  - Seated Assisted Cervical Rotation with Towel  - 3-4 x weekly - 3 sets - 10 reps  - Prone Cisco  - 3 x weekly - 3 sets  - Wall Cisco  - 3 x weekly - 3 sets - 15 reps    Pt performed these exercises with instruction and SPV.  Provided handout with these exercises for daily HEP.        Therapeutic Treatments and Modalities:     1. Manual Therapy (CPT 04345), see below, x25 min    3. Mechanical Traction  (CPT 65665), 25/10# x15 min c/s traction with mhp    Therapeutic Treatment and Modalities Summary: Manual to R shoulder:     CPA and rotational mobs gr III and IV at CTJ-T10 in prone lying    STM to pec (using C and S STM techniques with additional trigger point release)    STM to subclavius      Time-based treatments/modalities:    Physical Therapy Timed Treatment Charges  Manual therapy minutes (CPT 68720): 8 minutes  Therapeutic exercise minutes (CPT 81848): 38 minutes    Pain rating (1-10) before treatment: 0/10   Pain rating (1-10) after treatment:  0/10      ASSESSMENT:   Response to treatment:   See PN     PLAN/RECOMMENDATIONS:   Plan for treatment: therapy treatment to continue next visit.  Planned interventions for next visit: continue with current treatment.

## 2025-07-16 NOTE — OP THERAPY PROGRESS SUMMARY
Outpatient Physical Therapy  PROGRESS SUMMARY NOTE      [facility] Outpatient Physical Therapy  [address]  [place] [address]  Phone:  [phone]  Fax:  [phone]    Date of Visit: 07/15/2025    Patient: Fish Sandoval  YOB: 1975  MRN: [id number]     Referring Provider: Tarik Whitlock M.D.  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 Strain of right pectoralis muscle, initial encounter  S29.011A       Rehab Potential: fair    Progress Report Period: 5/14/25-7/16/25    Functional Assessment Used  Quickdash General Total Score: 18.18       Objective Findings and Assessment:   Patient progression towards goals: Pt has been seen for 8 visits and reports overall improvements. Frequency overall unchanged but intensity of s/s 40-45%. Noticed he has more triggers than previously. Will notice symptoms now with chest press and bicep curls-none previously, lasts about a minute before resolving. S/s have changed location and now occur in the arm rather than in the chest. He addresses it with a massage gun for a min to tolerance. Symptoms while walking the dog have improved; cannot recall most recent episode of discomfort. Pt overall is making slow but steady progress. Does demo sig improvements in elevation AROM following release to subclavius and coracobrachialis, may investigate tension at musculocutaneous nerve for restrictions in follow ups based on these results and reported deficits. Pt has additionally been complaining of neck stiffness with pos response to c/s traction in the  past, providing home unit for incr frequency of self c/s traction within the home. Recommending continuation of PT due to improvements seen with future emphasis on self muscle release, mobilization at shoulder, posture re-ed to address forward head and rounded shoulders, and strength/endurance progression for periscapular weakness.     Objective findings and assessment details: Shoulder range of motion   R Shoulder AROM pre-treatment seated:  150 deg R shoulder flexion   152 deg R shoulder abduction  70 deg R GH ER at side  T7 GH IR BTB (feels okay today)     Shoulder AROM post-treatment seated (following manual to subclavius and coracobrachialis)  160 deg R shoulder flexion    Nerve tension test:   Tension in radial nerve R    Palpation:  TTP R subclavius and coracobrachialis    Cervical Spine   Flexion: within functional limits  Extension: within functional limits  Left lateral flexion: Active left cervical lateral flexion: 20 deg.  Right lateral flexion: Active right cervical lateral flexion: 36 deg.  Left rotation: Active left cervical rotation: 52 deg.  Right rotation: Active right cervical rotation: 61 deg.        Goals:   Short Term Goals:   1. Pt will be independent with written HEP. (Met; ongoing)  Short term goal time span:  2-4 weeks      Long Term Goals:    1. Pt will be independent with written HEP. (Met; ongoing)   2. Pt will have a sig improvement in Quickdash (eval: 9.09) (not met; ongoing)   3. Pt will be able to return to normal UE weight lifting routine. (Partially met; ongoing)   4. Pt will be able to walk his dog pain-free consistently to improve ADL's. (Met; ongoing)    Long term goal time span:  6-8 weeks    Plan:   Planned therapy interventions:  E Stim Unattended (CPT 05371), Therapeutic Exercise (CPT 20326), Therapeutic Activities (CPT 18965), Manual Therapy (CPT 81253), Neuromuscular Re-education (CPT 55573) and Mechanical Traction (CPT 83881)  Frequency: 1x/wk to every other  week.  Duration in weeks:  6  Plan details:  UPOC: 8/29/25      Referring provider co-signature:  I have reviewed this plan of care and my co-signature certifies the need for services.     Certification Period: 07/15/2025 to 8/29/25    Physician Signature: ________________________________ Date: ______________

## 2025-08-12 ENCOUNTER — PHYSICAL THERAPY (OUTPATIENT)
Dept: PHYSICAL THERAPY | Facility: MEDICAL CENTER | Age: 50
End: 2025-08-12
Attending: PHYSICAL MEDICINE & REHABILITATION
Payer: COMMERCIAL

## 2025-08-12 DIAGNOSIS — S29.011A STRAIN OF RIGHT PECTORALIS MUSCLE, INITIAL ENCOUNTER: Primary | ICD-10-CM

## 2025-08-12 PROCEDURE — 97012 MECHANICAL TRACTION THERAPY: CPT

## 2025-08-12 PROCEDURE — 97110 THERAPEUTIC EXERCISES: CPT

## 2025-08-18 ENCOUNTER — OFFICE VISIT (OUTPATIENT)
Dept: INTERNAL MEDICINE | Facility: IMAGING CENTER | Age: 50
End: 2025-08-18
Payer: COMMERCIAL

## 2025-08-18 VITALS
DIASTOLIC BLOOD PRESSURE: 64 MMHG | TEMPERATURE: 98 F | SYSTOLIC BLOOD PRESSURE: 100 MMHG | HEART RATE: 52 BPM | RESPIRATION RATE: 14 BRPM | OXYGEN SATURATION: 96 %

## 2025-08-18 DIAGNOSIS — N48.1 BALANITIS: Primary | ICD-10-CM

## 2025-08-18 PROCEDURE — 99213 OFFICE O/P EST LOW 20 MIN: CPT | Performed by: INTERNAL MEDICINE

## 2025-08-18 PROCEDURE — 3078F DIAST BP <80 MM HG: CPT | Performed by: INTERNAL MEDICINE

## 2025-08-18 PROCEDURE — 3074F SYST BP LT 130 MM HG: CPT | Performed by: INTERNAL MEDICINE

## 2025-08-25 ENCOUNTER — OFFICE VISIT (OUTPATIENT)
Dept: PHYSICAL MEDICINE AND REHAB | Facility: MEDICAL CENTER | Age: 50
End: 2025-08-25
Payer: COMMERCIAL

## 2025-08-25 VITALS
HEART RATE: 62 BPM | SYSTOLIC BLOOD PRESSURE: 118 MMHG | DIASTOLIC BLOOD PRESSURE: 72 MMHG | BODY MASS INDEX: 24.12 KG/M2 | WEIGHT: 168.5 LBS | OXYGEN SATURATION: 95 % | TEMPERATURE: 98.2 F | HEIGHT: 70 IN

## 2025-08-25 DIAGNOSIS — M54.51 VERTEBROGENIC LOW BACK PAIN: ICD-10-CM

## 2025-08-25 DIAGNOSIS — M47.816 LUMBAR SPONDYLOSIS: Primary | ICD-10-CM

## 2025-08-25 DIAGNOSIS — G89.29 CHRONIC SHOULDER PAIN, UNSPECIFIED LATERALITY: ICD-10-CM

## 2025-08-25 DIAGNOSIS — M25.519 CHRONIC SHOULDER PAIN, UNSPECIFIED LATERALITY: ICD-10-CM

## 2025-08-25 DIAGNOSIS — M47.812 CERVICAL SPONDYLOSIS: ICD-10-CM

## 2025-08-25 DIAGNOSIS — M54.2 CHRONIC NECK PAIN: ICD-10-CM

## 2025-08-25 DIAGNOSIS — M25.859 HIP IMPINGEMENT SYNDROME, UNSPECIFIED LATERALITY: ICD-10-CM

## 2025-08-25 DIAGNOSIS — G89.29 CHRONIC NECK PAIN: ICD-10-CM

## 2025-08-25 PROCEDURE — 3078F DIAST BP <80 MM HG: CPT | Performed by: PHYSICAL MEDICINE & REHABILITATION

## 2025-08-25 PROCEDURE — 1125F AMNT PAIN NOTED PAIN PRSNT: CPT | Performed by: PHYSICAL MEDICINE & REHABILITATION

## 2025-08-25 PROCEDURE — 99214 OFFICE O/P EST MOD 30 MIN: CPT | Performed by: PHYSICAL MEDICINE & REHABILITATION

## 2025-08-25 PROCEDURE — 3074F SYST BP LT 130 MM HG: CPT | Performed by: PHYSICAL MEDICINE & REHABILITATION

## 2025-08-25 ASSESSMENT — PATIENT HEALTH QUESTIONNAIRE - PHQ9: CLINICAL INTERPRETATION OF PHQ2 SCORE: 0

## 2025-08-25 ASSESSMENT — FIBROSIS 4 INDEX: FIB4 SCORE: 1.19

## 2025-08-25 ASSESSMENT — PAIN SCALES - GENERAL: PAINLEVEL_OUTOF10: 3=SLIGHT PAIN

## 2025-08-27 ENCOUNTER — PHYSICAL THERAPY (OUTPATIENT)
Dept: PHYSICAL THERAPY | Facility: MEDICAL CENTER | Age: 50
End: 2025-08-27
Attending: PHYSICAL MEDICINE & REHABILITATION
Payer: COMMERCIAL

## 2025-08-27 DIAGNOSIS — S29.011A STRAIN OF RIGHT PECTORALIS MUSCLE, INITIAL ENCOUNTER: Primary | ICD-10-CM

## 2025-08-27 PROCEDURE — 97110 THERAPEUTIC EXERCISES: CPT

## 2025-08-28 ENCOUNTER — HOSPITAL ENCOUNTER (OUTPATIENT)
Facility: MEDICAL CENTER | Age: 50
End: 2025-08-28
Attending: INTERNAL MEDICINE
Payer: COMMERCIAL

## 2025-08-28 ENCOUNTER — NON-PROVIDER VISIT (OUTPATIENT)
Dept: INTERNAL MEDICINE | Facility: IMAGING CENTER | Age: 50
End: 2025-08-28
Payer: COMMERCIAL

## 2025-08-28 DIAGNOSIS — E78.00 PURE HYPERCHOLESTEROLEMIA: ICD-10-CM

## 2025-08-28 DIAGNOSIS — E78.41 ELEVATED LIPOPROTEIN(A): ICD-10-CM

## 2025-08-28 DIAGNOSIS — Z01.89 ENCOUNTER FOR ROUTINE LABORATORY TESTING: Primary | ICD-10-CM

## 2025-08-28 DIAGNOSIS — E56.9 VITAMIN DEFICIENCY: ICD-10-CM

## 2025-08-28 DIAGNOSIS — Z12.5 SCREENING PSA (PROSTATE SPECIFIC ANTIGEN): ICD-10-CM

## 2025-08-28 DIAGNOSIS — G47.33 OSA (OBSTRUCTIVE SLEEP APNEA): ICD-10-CM

## 2025-08-28 DIAGNOSIS — Z00.00 WELL ADULT EXAM: ICD-10-CM

## 2025-08-28 LAB
25(OH)D3 SERPL-MCNC: 57 NG/ML (ref 30–100)
ALBUMIN SERPL BCP-MCNC: 4.6 G/DL (ref 3.2–4.9)
ALBUMIN/GLOB SERPL: 1.8 G/DL
ALP SERPL-CCNC: 66 U/L (ref 30–99)
ALT SERPL-CCNC: 31 U/L (ref 2–50)
ANION GAP SERPL CALC-SCNC: 13 MMOL/L (ref 7–16)
APPEARANCE UR: CLEAR
AST SERPL-CCNC: 26 U/L (ref 12–45)
BASOPHILS # BLD AUTO: 1.3 % (ref 0–1.8)
BASOPHILS # BLD: 0.05 K/UL (ref 0–0.12)
BILIRUB SERPL-MCNC: 0.6 MG/DL (ref 0.1–1.5)
BILIRUB UR QL STRIP.AUTO: NEGATIVE
BUN SERPL-MCNC: 18 MG/DL (ref 8–22)
CALCIUM ALBUM COR SERPL-MCNC: 8.8 MG/DL (ref 8.5–10.5)
CALCIUM SERPL-MCNC: 9.3 MG/DL (ref 8.5–10.5)
CHLORIDE SERPL-SCNC: 103 MMOL/L (ref 96–112)
CHOLEST SERPL-MCNC: 203 MG/DL (ref 100–199)
CO2 SERPL-SCNC: 22 MMOL/L (ref 20–33)
COLOR UR: YELLOW
CREAT SERPL-MCNC: 0.98 MG/DL (ref 0.5–1.4)
EOSINOPHIL # BLD AUTO: 0.18 K/UL (ref 0–0.51)
EOSINOPHIL NFR BLD: 4.6 % (ref 0–6.9)
ERYTHROCYTE [DISTWIDTH] IN BLOOD BY AUTOMATED COUNT: 45.6 FL (ref 35.9–50)
EST. AVERAGE GLUCOSE BLD GHB EST-MCNC: 111 MG/DL
GFR SERPLBLD CREATININE-BSD FMLA CKD-EPI: 93 ML/MIN/1.73 M 2
GLOBULIN SER CALC-MCNC: 2.6 G/DL (ref 1.9–3.5)
GLUCOSE SERPL-MCNC: 89 MG/DL (ref 65–99)
GLUCOSE UR STRIP.AUTO-MCNC: NEGATIVE MG/DL
HBA1C MFR BLD: 5.5 % (ref 4–5.6)
HCT VFR BLD AUTO: 42.7 % (ref 42–52)
HDLC SERPL-MCNC: 77 MG/DL
HGB BLD-MCNC: 14.7 G/DL (ref 14–18)
IMM GRANULOCYTES # BLD AUTO: 0.01 K/UL (ref 0–0.11)
IMM GRANULOCYTES NFR BLD AUTO: 0.3 % (ref 0–0.9)
KETONES UR STRIP.AUTO-MCNC: NEGATIVE MG/DL
LDLC SERPL CALC-MCNC: 115 MG/DL
LEUKOCYTE ESTERASE UR QL STRIP.AUTO: NEGATIVE
LYMPHOCYTES # BLD AUTO: 1.18 K/UL (ref 1–4.8)
LYMPHOCYTES NFR BLD: 30.3 % (ref 22–41)
MCH RBC QN AUTO: 32.2 PG (ref 27–33)
MCHC RBC AUTO-ENTMCNC: 34.4 G/DL (ref 32.3–36.5)
MCV RBC AUTO: 93.6 FL (ref 81.4–97.8)
MICRO URNS: NORMAL
MONOCYTES # BLD AUTO: 0.32 K/UL (ref 0–0.85)
MONOCYTES NFR BLD AUTO: 8.2 % (ref 0–13.4)
NEUTROPHILS # BLD AUTO: 2.15 K/UL (ref 1.82–7.42)
NEUTROPHILS NFR BLD: 55.3 % (ref 44–72)
NITRITE UR QL STRIP.AUTO: NEGATIVE
NRBC # BLD AUTO: 0 K/UL
NRBC BLD-RTO: 0 /100 WBC (ref 0–0.2)
PH UR STRIP.AUTO: 6 [PH] (ref 5–8)
PLATELET # BLD AUTO: 192 K/UL (ref 164–446)
PMV BLD AUTO: 11.5 FL (ref 9–12.9)
POTASSIUM SERPL-SCNC: 4.1 MMOL/L (ref 3.6–5.5)
PROT SERPL-MCNC: 7.2 G/DL (ref 6–8.2)
PROT UR QL STRIP: NEGATIVE MG/DL
PSA SERPL DL<=0.01 NG/ML-MCNC: 0.98 NG/ML (ref 0–4)
RBC # BLD AUTO: 4.56 M/UL (ref 4.7–6.1)
RBC UR QL AUTO: NEGATIVE
SODIUM SERPL-SCNC: 138 MMOL/L (ref 135–145)
SP GR UR STRIP.AUTO: 1.01
TRIGL SERPL-MCNC: 56 MG/DL (ref 0–149)
UROBILINOGEN UR STRIP.AUTO-MCNC: 0.2 EU/DL
WBC # BLD AUTO: 3.9 K/UL (ref 4.8–10.8)

## 2025-08-28 PROCEDURE — 84153 ASSAY OF PSA TOTAL: CPT

## 2025-08-28 PROCEDURE — 84402 ASSAY OF FREE TESTOSTERONE: CPT

## 2025-08-28 PROCEDURE — 80061 LIPID PANEL: CPT

## 2025-08-28 PROCEDURE — 81003 URINALYSIS AUTO W/O SCOPE: CPT

## 2025-08-28 PROCEDURE — 83695 ASSAY OF LIPOPROTEIN(A): CPT

## 2025-08-28 PROCEDURE — 82172 ASSAY OF APOLIPOPROTEIN: CPT

## 2025-08-28 PROCEDURE — 83036 HEMOGLOBIN GLYCOSYLATED A1C: CPT

## 2025-08-28 PROCEDURE — 84270 ASSAY OF SEX HORMONE GLOBUL: CPT

## 2025-08-28 PROCEDURE — 82306 VITAMIN D 25 HYDROXY: CPT

## 2025-08-28 PROCEDURE — 80053 COMPREHEN METABOLIC PANEL: CPT

## 2025-08-28 PROCEDURE — 84597 ASSAY OF VITAMIN K: CPT

## 2025-08-28 PROCEDURE — 84403 ASSAY OF TOTAL TESTOSTERONE: CPT

## 2025-08-28 PROCEDURE — 84590 ASSAY OF VITAMIN A: CPT

## 2025-08-28 PROCEDURE — 85025 COMPLETE CBC W/AUTO DIFF WBC: CPT

## 2025-08-28 PROCEDURE — 84446 ASSAY OF VITAMIN E: CPT

## 2025-08-30 LAB
APO B100 SERPL-MCNC: 85 MG/DL (ref 66–133)
LPA SERPL-MCNC: 48 MG/DL
SHBG SERPL-SCNC: 44 NMOL/L (ref 19–76)
TESTOST FREE MFR SERPL: 1.6 % (ref 1.6–2.9)
TESTOST FREE SERPL-MCNC: 73 PG/ML (ref 47–244)
TESTOST SERPL-MCNC: 458 NG/DL (ref 300–890)